# Patient Record
Sex: MALE | Race: WHITE | NOT HISPANIC OR LATINO | URBAN - METROPOLITAN AREA
[De-identification: names, ages, dates, MRNs, and addresses within clinical notes are randomized per-mention and may not be internally consistent; named-entity substitution may affect disease eponyms.]

---

## 2017-01-26 ENCOUNTER — EMERGENCY (EMERGENCY)
Facility: HOSPITAL | Age: 82
LOS: 0 days | Discharge: NURSING, REHAB OR LONG-TERM CARE | End: 2017-01-26
Admitting: EMERGENCY MEDICINE
Payer: MEDICARE
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
## 2017-01-26 DIAGNOSIS — S09.90XA UNSPECIFIED INJURY OF HEAD, INITIAL ENCOUNTER: ICD-10-CM
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
## 2017-01-26 DIAGNOSIS — F03.90 UNSPECIFIED DEMENTIA, UNSPECIFIED SEVERITY, WITHOUT BEHAVIORAL DISTURBANCE, PSYCHOTIC DISTURBANCE, MOOD DISTURBANCE, AND ANXIETY: ICD-10-CM

## 2017-01-26 DIAGNOSIS — Z91.81 HISTORY OF FALLING: ICD-10-CM

## 2017-01-26 PROCEDURE — 73521 X-RAY EXAM HIPS BI 2 VIEWS: CPT | Mod: 26

## 2017-01-26 PROCEDURE — 99285 EMERGENCY DEPT VISIT HI MDM: CPT

## 2017-01-26 PROCEDURE — 71010: CPT | Mod: 26

## 2017-01-26 PROCEDURE — 70450 CT HEAD/BRAIN W/O DYE: CPT | Mod: 26

## 2017-03-08 ENCOUNTER — EMERGENCY (EMERGENCY)
Facility: HOSPITAL | Age: 82
LOS: 0 days | Discharge: HOME | End: 2017-03-09
Attending: EMERGENCY MEDICINE | Admitting: EMERGENCY MEDICINE
Payer: MEDICARE

## 2017-03-08 VITALS — TEMPERATURE: 98 F | HEART RATE: 81 BPM | RESPIRATION RATE: 18 BRPM | OXYGEN SATURATION: 99 %

## 2017-03-08 DIAGNOSIS — S02.2XXA FRACTURE OF NASAL BONES, INITIAL ENCOUNTER FOR CLOSED FRACTURE: ICD-10-CM
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
## 2017-03-08 DIAGNOSIS — Z91.81 HISTORY OF FALLING: ICD-10-CM

## 2017-03-08 DIAGNOSIS — S09.90XA UNSPECIFIED INJURY OF HEAD, INITIAL ENCOUNTER: ICD-10-CM
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
## 2017-03-08 DIAGNOSIS — S01.112A LACERATION WITHOUT FOREIGN BODY OF LEFT EYELID AND PERIOCULAR AREA, INITIAL ENCOUNTER: ICD-10-CM

## 2017-03-08 LAB
ALBUMIN SERPL ELPH-MCNC: 3 G/DL — LOW (ref 3.3–5)
ALP SERPL-CCNC: 103 U/L — SIGNIFICANT CHANGE UP (ref 40–120)
ALT FLD-CCNC: 23 U/L — SIGNIFICANT CHANGE UP (ref 12–78)
ANION GAP SERPL CALC-SCNC: 7 MMOL/L — SIGNIFICANT CHANGE UP (ref 5–17)
APTT BLD: 27.6 SEC — SIGNIFICANT CHANGE UP (ref 27.5–37.4)
AST SERPL-CCNC: 23 U/L — SIGNIFICANT CHANGE UP (ref 15–37)
BASOPHILS # BLD AUTO: 0.1 K/UL — SIGNIFICANT CHANGE UP (ref 0–0.2)
BASOPHILS NFR BLD AUTO: 1.4 % — SIGNIFICANT CHANGE UP (ref 0–2)
BILIRUB SERPL-MCNC: 0.5 MG/DL — SIGNIFICANT CHANGE UP (ref 0.2–1.2)
BUN SERPL-MCNC: 25 MG/DL — HIGH (ref 7–23)
CALCIUM SERPL-MCNC: 8.9 MG/DL — SIGNIFICANT CHANGE UP (ref 8.5–10.1)
CHLORIDE SERPL-SCNC: 110 MMOL/L — HIGH (ref 96–108)
CO2 SERPL-SCNC: 30 MMOL/L — SIGNIFICANT CHANGE UP (ref 22–31)
CREAT SERPL-MCNC: 1.3 MG/DL — SIGNIFICANT CHANGE UP (ref 0.5–1.3)
EOSINOPHIL # BLD AUTO: 0.1 K/UL — SIGNIFICANT CHANGE UP (ref 0–0.5)
EOSINOPHIL NFR BLD AUTO: 1.5 % — SIGNIFICANT CHANGE UP (ref 0–6)
GLUCOSE SERPL-MCNC: 177 MG/DL — HIGH (ref 70–99)
HCT VFR BLD CALC: 40.4 % — SIGNIFICANT CHANGE UP (ref 39–50)
HGB BLD-MCNC: 13.6 G/DL — SIGNIFICANT CHANGE UP (ref 13–17)
INR BLD: 1.04 RATIO — SIGNIFICANT CHANGE UP (ref 0.88–1.16)
LYMPHOCYTES # BLD AUTO: 1.4 K/UL — SIGNIFICANT CHANGE UP (ref 1–3.3)
LYMPHOCYTES # BLD AUTO: 15.8 % — SIGNIFICANT CHANGE UP (ref 13–44)
MCHC RBC-ENTMCNC: 28.7 PG — SIGNIFICANT CHANGE UP (ref 27–34)
MCHC RBC-ENTMCNC: 33.6 GM/DL — SIGNIFICANT CHANGE UP (ref 32–36)
MCV RBC AUTO: 85.6 FL — SIGNIFICANT CHANGE UP (ref 80–100)
MONOCYTES # BLD AUTO: 0.7 K/UL — SIGNIFICANT CHANGE UP (ref 0–0.9)
MONOCYTES NFR BLD AUTO: 8.4 % — SIGNIFICANT CHANGE UP (ref 2–14)
NEUTROPHILS # BLD AUTO: 6.5 K/UL — SIGNIFICANT CHANGE UP (ref 1.8–7.4)
NEUTROPHILS NFR BLD AUTO: 72.9 % — SIGNIFICANT CHANGE UP (ref 43–77)
PLATELET # BLD AUTO: 232 K/UL — SIGNIFICANT CHANGE UP (ref 150–400)
POTASSIUM SERPL-MCNC: 4.4 MMOL/L — SIGNIFICANT CHANGE UP (ref 3.5–5.3)
POTASSIUM SERPL-SCNC: 4.4 MMOL/L — SIGNIFICANT CHANGE UP (ref 3.5–5.3)
PROT SERPL-MCNC: 7 GM/DL — SIGNIFICANT CHANGE UP (ref 6–8.3)
PROTHROM AB SERPL-ACNC: 11.4 SEC — SIGNIFICANT CHANGE UP (ref 10–13.1)
RBC # BLD: 4.72 M/UL — SIGNIFICANT CHANGE UP (ref 4.2–5.8)
RBC # FLD: 12.3 % — SIGNIFICANT CHANGE UP (ref 10.3–14.5)
SODIUM SERPL-SCNC: 147 MMOL/L — HIGH (ref 135–145)
WBC # BLD: 8.9 K/UL — SIGNIFICANT CHANGE UP (ref 3.8–10.5)
WBC # FLD AUTO: 8.9 K/UL — SIGNIFICANT CHANGE UP (ref 3.8–10.5)

## 2017-03-08 PROCEDURE — 76377 3D RENDER W/INTRP POSTPROCES: CPT | Mod: 26

## 2017-03-08 PROCEDURE — 72125 CT NECK SPINE W/O DYE: CPT | Mod: 26

## 2017-03-08 PROCEDURE — 99285 EMERGENCY DEPT VISIT HI MDM: CPT | Mod: 25

## 2017-03-08 PROCEDURE — 70450 CT HEAD/BRAIN W/O DYE: CPT | Mod: 26

## 2017-03-08 PROCEDURE — 70486 CT MAXILLOFACIAL W/O DYE: CPT | Mod: 26

## 2017-03-08 RX ADMIN — Medication 2 MILLIGRAM(S): at 20:35

## 2017-03-08 NOTE — ED PROVIDER NOTE - NEUROLOGICAL, MLM
Alert and oriented, no focal deficits, no motor or sensory deficits. no focal deficits, no motor or sensory deficits.

## 2017-03-08 NOTE — ED PROVIDER NOTE - NS ED MD SCRIBE ATTENDING SCRIBE SECTIONS
RESULTS/PHYSICAL EXAM/PAST MEDICAL/SURGICAL/SOCIAL HISTORY/HIV/PROGRESS NOTE/INTAKE ASSESSMENT/SCREENINGS/HISTORY OF PRESENT ILLNESS/REVIEW OF SYSTEMS/CONSULTATIONS/SHIFT CHANGE/DISPOSITION

## 2017-03-08 NOTE — ED PROVIDER NOTE - MUSCULOSKELETAL, MLM
Spine appears normal, range of motion is not limited, no muscle or joint tenderness Spine appears normal, range of motion is not limited, no muscle or joint tenderness; moving all 4 extremities

## 2017-03-08 NOTE — ED PROVIDER NOTE - ENMT, MLM
Airway patent, Nasal mucosa clear. Mouth with normal mucosa. Throat has no vesicles, no oropharyngeal exudates and uvula is midline. Airway patent,  Mouth with normal mucosa. Throat has no vesicles, no oropharyngeal exudates and uvula is midline.  +diffuse swelling to nose; dried blood b/l nares; no septal hematoma

## 2017-03-08 NOTE — ED ADULT TRIAGE NOTE - CHIEF COMPLAINT QUOTE
witnessed fall at [facility] [address], pt was standing up and fell out of his wheelchair, lac above left eye

## 2017-03-08 NOTE — ED PROVIDER NOTE - PROGRESS NOTE DETAILS
Pt's son Bob: [phone] patient son and daughter do not want patient to return to [facility]  will hold in ED for social work evaluation in am  signed out to Dr. Pitts pending SINAN cm in am

## 2017-03-08 NOTE — ED PROVIDER NOTE - OBJECTIVE STATEMENT
80 y/o M presents to the ED s/p fall. The pt's son provides that the pt fell at [facility]. Pt has a lac over left eyebrow, bleed from right nostril, and lac above left elbow.

## 2017-03-08 NOTE — ED ADULT NURSE NOTE - CHIEF COMPLAINT QUOTE
witnessed fall at [facility], pt was standing up and fell out of his wheelchair, lac above left eye

## 2017-03-08 NOTE — ED PROVIDER NOTE - CARE PLAN
Principal Discharge DX:	Head trauma  Secondary Diagnosis:	Nasal fracture  Secondary Diagnosis:	Laceration

## 2017-03-09 VITALS
DIASTOLIC BLOOD PRESSURE: 75 MMHG | SYSTOLIC BLOOD PRESSURE: 188 MMHG | RESPIRATION RATE: 18 BRPM | HEART RATE: 88 BPM | OXYGEN SATURATION: 199 % | TEMPERATURE: 98 F

## 2017-03-09 RX ORDER — DIVALPROEX SODIUM 500 MG/1
250 TABLET, DELAYED RELEASE ORAL ONCE
Qty: 0 | Refills: 0 | Status: DISCONTINUED | OUTPATIENT
Start: 2017-03-09 | End: 2017-03-09

## 2017-03-09 RX ORDER — MEMANTINE HYDROCHLORIDE 10 MG/1
10 TABLET ORAL ONCE
Qty: 0 | Refills: 0 | Status: DISCONTINUED | OUTPATIENT
Start: 2017-03-09 | End: 2017-03-09

## 2017-03-09 RX ORDER — HYDRALAZINE HCL 50 MG
10 TABLET ORAL ONCE
Qty: 0 | Refills: 0 | Status: COMPLETED | OUTPATIENT
Start: 2017-03-09 | End: 2017-03-09

## 2017-03-09 RX ORDER — QUETIAPINE FUMARATE 200 MG/1
25 TABLET, FILM COATED ORAL ONCE
Qty: 0 | Refills: 0 | Status: DISCONTINUED | OUTPATIENT
Start: 2017-03-09 | End: 2017-03-09

## 2017-03-09 RX ADMIN — Medication 2 MILLIGRAM(S): at 03:58

## 2017-03-09 RX ADMIN — Medication 10 MILLIGRAM(S): at 07:55

## 2017-03-09 NOTE — ED ADULT NURSE REASSESSMENT NOTE - NS ED NURSE REASSESS COMMENT FT1
Pt. medicated as ordered. Pt. is resting sleeping and calm in bed- Pending SW in the AM - Will cont to monitor pt. closely - Safety maintained

## 2017-08-10 ENCOUNTER — EMERGENCY (EMERGENCY)
Facility: HOSPITAL | Age: 82
LOS: 0 days | Discharge: NURSING, REHAB OR LONG-TERM CARE | End: 2017-08-10
Attending: EMERGENCY MEDICINE
Payer: MEDICARE

## 2017-08-10 VITALS
OXYGEN SATURATION: 100 % | DIASTOLIC BLOOD PRESSURE: 72 MMHG | SYSTOLIC BLOOD PRESSURE: 160 MMHG | RESPIRATION RATE: 17 BRPM | TEMPERATURE: 98 F | HEART RATE: 72 BPM

## 2017-08-10 VITALS
DIASTOLIC BLOOD PRESSURE: 74 MMHG | HEART RATE: 66 BPM | HEIGHT: 68 IN | TEMPERATURE: 98 F | WEIGHT: 179.9 LBS | SYSTOLIC BLOOD PRESSURE: 155 MMHG | RESPIRATION RATE: 16 BRPM | OXYGEN SATURATION: 100 %

## 2017-08-10 DIAGNOSIS — S01.81XA LACERATION WITHOUT FOREIGN BODY OF OTHER PART OF HEAD, INITIAL ENCOUNTER: ICD-10-CM

## 2017-08-10 DIAGNOSIS — S01.01XA LACERATION WITHOUT FOREIGN BODY OF SCALP, INITIAL ENCOUNTER: ICD-10-CM
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
## 2017-08-10 DIAGNOSIS — F03.90 UNSPECIFIED DEMENTIA, UNSPECIFIED SEVERITY, WITHOUT BEHAVIORAL DISTURBANCE, PSYCHOTIC DISTURBANCE, MOOD DISTURBANCE, AND ANXIETY: ICD-10-CM
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
## 2017-08-10 DIAGNOSIS — I10 ESSENTIAL (PRIMARY) HYPERTENSION: ICD-10-CM

## 2017-08-10 PROCEDURE — 71010: CPT | Mod: 26

## 2017-08-10 PROCEDURE — 70486 CT MAXILLOFACIAL W/O DYE: CPT | Mod: 26

## 2017-08-10 PROCEDURE — 99284 EMERGENCY DEPT VISIT MOD MDM: CPT | Mod: 25

## 2017-08-10 PROCEDURE — 70450 CT HEAD/BRAIN W/O DYE: CPT | Mod: 26

## 2017-08-10 PROCEDURE — 76377 3D RENDER W/INTRP POSTPROCES: CPT | Mod: 26

## 2017-08-10 PROCEDURE — 12001 RPR S/N/AX/GEN/TRNK 2.5CM/<: CPT

## 2017-08-10 PROCEDURE — 72125 CT NECK SPINE W/O DYE: CPT | Mod: 26

## 2017-08-10 RX ORDER — TETANUS TOXOID, REDUCED DIPHTHERIA TOXOID AND ACELLULAR PERTUSSIS VACCINE, ADSORBED 5; 2.5; 8; 8; 2.5 [IU]/.5ML; [IU]/.5ML; UG/.5ML; UG/.5ML; UG/.5ML
0.5 SUSPENSION INTRAMUSCULAR ONCE
Qty: 0 | Refills: 0 | Status: COMPLETED | OUTPATIENT
Start: 2017-08-10 | End: 2017-08-10

## 2017-08-10 RX ADMIN — TETANUS TOXOID, REDUCED DIPHTHERIA TOXOID AND ACELLULAR PERTUSSIS VACCINE, ADSORBED 0.5 MILLILITER(S): 5; 2.5; 8; 8; 2.5 SUSPENSION INTRAMUSCULAR at 14:21

## 2017-08-10 NOTE — ED PROVIDER NOTE - SKIN, MLM
Abrasion on R cheek, 0.5cm vertical laceration w/o active bleeding overlying medial aspect of R eyebrow

## 2017-08-10 NOTE — ED PROVIDER NOTE - CARE PLAN
Principal Discharge DX:	Fall  Instructions for follow-up, activity and diet:	Keep area dry for 24 hours. Afterward, allow water to run over area but do not scrub or immerse in water. Avoid using oil based creams or products as this will cause the dermabond to dissolve.  Do not pull or attempt to remove Dermabond--allow wound to heal underneath and Dermabond will fall off on its own when ready.  Return to the emergency department if you experience worsening pain, fever, drainage from site, redness, red streaking, or any other emergency. Principal Discharge DX:	Fall  Instructions for follow-up, activity and diet:	Keep area dry for 24 hours. Afterward, allow water to run over area but do not scrub or immerse in water. Avoid using oil based creams or products as this will cause the dermabond to dissolve.  Do not pull or attempt to remove Dermabond--allow wound to heal underneath and Dermabond will fall off on its own when ready.  Return to the emergency department if you experience worsening pain, fever, drainage from site, redness, red streaking, or any other emergency.  Secondary Diagnosis:	Laceration of forehead, initial encounter

## 2017-08-10 NOTE — ED PROVIDER NOTE - ATTENDING CONTRIBUTION TO CARE
82M with advanced dementia presents with facial laceration s/p fall. Unable to obtain further history. Agitated male, confused, but awake, alert, GCS 13. Trachea midline, Normal conjunctiva, CTAB, Non-tachycardic, Normal perfusion, Abdomen Soft, NTND, No rebound/guarding, No LE edema. No midline C, T, L stepoff or ttp. Full ROM extremities without focal ttp. Moving extremities x 4.     81 y/o male with advanced dementia with forehead laceration s/p fall at nursing home. CT head, c-spine, max-face. Lac repair. Re-assess. - Gene Miranda MD

## 2017-08-10 NOTE — ED PROVIDER NOTE - PROGRESS NOTE DETAILS
Jr Medina MD PGY3: Imaging reviewed. Laceration repaired w/ dermabond. Will discharge with instructions for outpatient follow-up. patient's son arrived who confirms that patient is at baseline. discussed results with son. patient very agitated, son requests transfer to NH as soon as possible. will arrange. - Gene Miranda MD

## 2017-08-10 NOTE — ED PROVIDER NOTE - PLAN OF CARE
Keep area dry for 24 hours. Afterward, allow water to run over area but do not scrub or immerse in water. Avoid using oil based creams or products as this will cause the dermabond to dissolve.  Do not pull or attempt to remove Dermabond--allow wound to heal underneath and Dermabond will fall off on its own when ready.  Return to the emergency department if you experience worsening pain, fever, drainage from site, redness, red streaking, or any other emergency.

## 2017-08-10 NOTE — ED PROVIDER NOTE - OBJECTIVE STATEMENT
83 yo man w/ dementia sent from NH for fall. Pt reportedly fell, hitting R face in process. Unable to provide history in ED.

## 2017-10-23 ENCOUNTER — EMERGENCY (EMERGENCY)
Facility: HOSPITAL | Age: 82
LOS: 0 days | Discharge: HOME | End: 2017-10-23
Attending: EMERGENCY MEDICINE | Admitting: EMERGENCY MEDICINE
Payer: MEDICARE

## 2017-10-23 VITALS
DIASTOLIC BLOOD PRESSURE: 76 MMHG | RESPIRATION RATE: 18 BRPM | OXYGEN SATURATION: 99 % | TEMPERATURE: 98 F | SYSTOLIC BLOOD PRESSURE: 136 MMHG | HEART RATE: 99 BPM

## 2017-10-23 VITALS
HEART RATE: 97 BPM | SYSTOLIC BLOOD PRESSURE: 160 MMHG | WEIGHT: 169.98 LBS | HEIGHT: 69 IN | RESPIRATION RATE: 15 BRPM | OXYGEN SATURATION: 97 % | DIASTOLIC BLOOD PRESSURE: 90 MMHG

## 2017-10-23 DIAGNOSIS — S01.81XA LACERATION WITHOUT FOREIGN BODY OF OTHER PART OF HEAD, INITIAL ENCOUNTER: ICD-10-CM

## 2017-10-23 DIAGNOSIS — S09.93XA UNSPECIFIED INJURY OF FACE, INITIAL ENCOUNTER: ICD-10-CM
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
## 2017-10-23 DIAGNOSIS — S02.2XXA FRACTURE OF NASAL BONES, INITIAL ENCOUNTER FOR CLOSED FRACTURE: ICD-10-CM

## 2017-10-23 DIAGNOSIS — I10 ESSENTIAL (PRIMARY) HYPERTENSION: ICD-10-CM

## 2017-10-23 DIAGNOSIS — F03.90 UNSPECIFIED DEMENTIA WITHOUT BEHAVIORAL DISTURBANCE: ICD-10-CM

## 2017-10-23 PROCEDURE — 71010: CPT | Mod: 26

## 2017-10-23 PROCEDURE — 72190 X-RAY EXAM OF PELVIS: CPT | Mod: 26

## 2017-10-23 PROCEDURE — 70450 CT HEAD/BRAIN W/O DYE: CPT | Mod: 26

## 2017-10-23 PROCEDURE — 72125 CT NECK SPINE W/O DYE: CPT | Mod: 26

## 2017-10-23 PROCEDURE — 93010 ELECTROCARDIOGRAM REPORT: CPT

## 2017-10-23 PROCEDURE — 99285 EMERGENCY DEPT VISIT HI MDM: CPT

## 2017-10-23 PROCEDURE — 12013 RPR F/E/E/N/L/M 2.6-5.0 CM: CPT

## 2017-10-23 PROCEDURE — 76377 3D RENDER W/INTRP POSTPROCES: CPT | Mod: 26

## 2017-10-23 PROCEDURE — 70486 CT MAXILLOFACIAL W/O DYE: CPT | Mod: 26

## 2017-10-23 RX ORDER — TETANUS TOXOID, REDUCED DIPHTHERIA TOXOID AND ACELLULAR PERTUSSIS VACCINE, ADSORBED 5; 2.5; 8; 8; 2.5 [IU]/.5ML; [IU]/.5ML; UG/.5ML; UG/.5ML; UG/.5ML
0.5 SUSPENSION INTRAMUSCULAR ONCE
Qty: 0 | Refills: 0 | Status: COMPLETED | OUTPATIENT
Start: 2017-10-23 | End: 2017-10-23

## 2017-10-23 RX ORDER — HALOPERIDOL DECANOATE 100 MG/ML
5 INJECTION INTRAMUSCULAR ONCE
Qty: 0 | Refills: 0 | Status: COMPLETED | OUTPATIENT
Start: 2017-10-23 | End: 2017-10-23

## 2017-10-23 RX ADMIN — HALOPERIDOL DECANOATE 5 MILLIGRAM(S): 100 INJECTION INTRAMUSCULAR at 11:26

## 2017-10-23 RX ADMIN — TETANUS TOXOID, REDUCED DIPHTHERIA TOXOID AND ACELLULAR PERTUSSIS VACCINE, ADSORBED 0.5 MILLILITER(S): 5; 2.5; 8; 8; 2.5 SUSPENSION INTRAMUSCULAR at 11:26

## 2017-10-23 NOTE — ED ADULT TRIAGE NOTE - CHIEF COMPLAINT QUOTE
fall from standing height at apex, no blood thinners, facial bleeding noted to nose, pt has hx of dementia.

## 2017-10-23 NOTE — ED ADULT NURSE NOTE - CHPI ED SYMPTOMS NEG
no fever/no tingling/no confusion/no abrasion/no loss of consciousness/no vomiting/no deformity/no weakness/no numbness

## 2017-10-23 NOTE — ED PROVIDER NOTE - PROGRESS NOTE DETAILS
CTs as above, nasal fracture w/o intracranial injury. Laceration repaired by PA, see separate note.  Will clear for DC back to SNF with instructions for nasal precautions (as feasible), abx, ENT f/u

## 2017-10-23 NOTE — ED PROVIDER NOTE - OBJECTIVE STATEMENT
82 M hx dementia, HTN presents from SNF after fall. Per EMS, fall was witnessed by staff, pt fell from standing height. Neg LOC, no blood thinners. Pt presents with bleeding from central forehead laceration and dried blood in mouth, no resp distress. Remainder of hx limited by mental status.

## 2017-10-23 NOTE — ED PROVIDER NOTE - CARE PLAN
Principal Discharge DX:	Nasal fracture  Secondary Diagnosis:	Facial laceration, initial encounter  Secondary Diagnosis:	Fall

## 2017-10-23 NOTE — ED PROVIDER NOTE - ENMT, MLM
Airway patent, Nasal mucosa clear. Mouth with dried blood, no active bleeding. Throat has no vesicles, no oropharyngeal exudates and uvula is midline.

## 2017-11-09 ENCOUNTER — EMERGENCY (EMERGENCY)
Facility: HOSPITAL | Age: 82
LOS: 0 days | Discharge: NURSING, REHAB OR LONG-TERM CARE | End: 2017-11-09
Attending: FAMILY MEDICINE | Admitting: EMERGENCY MEDICINE
Payer: MEDICARE

## 2017-11-09 VITALS
WEIGHT: 164.91 LBS | RESPIRATION RATE: 19 BRPM | DIASTOLIC BLOOD PRESSURE: 93 MMHG | SYSTOLIC BLOOD PRESSURE: 149 MMHG | OXYGEN SATURATION: 97 % | HEART RATE: 92 BPM | HEIGHT: 67 IN | TEMPERATURE: 98 F
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
## 2017-11-09 DIAGNOSIS — F03.90 UNSPECIFIED DEMENTIA WITHOUT BEHAVIORAL DISTURBANCE: ICD-10-CM

## 2017-11-09 DIAGNOSIS — S02.2XXA FRACTURE OF NASAL BONES, INITIAL ENCOUNTER FOR CLOSED FRACTURE: ICD-10-CM

## 2017-11-09 DIAGNOSIS — S51.011A LACERATION WITHOUT FOREIGN BODY OF RIGHT ELBOW, INITIAL ENCOUNTER: ICD-10-CM

## 2017-11-09 DIAGNOSIS — Z91.81 HISTORY OF FALLING: ICD-10-CM

## 2017-11-09 DIAGNOSIS — S00.81XA ABRASION OF OTHER PART OF HEAD, INITIAL ENCOUNTER: ICD-10-CM

## 2017-11-09 DIAGNOSIS — S09.90XA UNSPECIFIED INJURY OF HEAD, INITIAL ENCOUNTER: ICD-10-CM
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
## 2017-11-09 PROCEDURE — 70486 CT MAXILLOFACIAL W/O DYE: CPT | Mod: 26

## 2017-11-09 PROCEDURE — 72190 X-RAY EXAM OF PELVIS: CPT | Mod: 26

## 2017-11-09 PROCEDURE — 70450 CT HEAD/BRAIN W/O DYE: CPT | Mod: 26

## 2017-11-09 PROCEDURE — 73080 X-RAY EXAM OF ELBOW: CPT | Mod: 26,RT

## 2017-11-09 PROCEDURE — 99285 EMERGENCY DEPT VISIT HI MDM: CPT

## 2017-11-09 PROCEDURE — 76377 3D RENDER W/INTRP POSTPROCES: CPT | Mod: 26

## 2017-11-09 PROCEDURE — 72125 CT NECK SPINE W/O DYE: CPT | Mod: 26

## 2017-11-09 PROCEDURE — 71010: CPT | Mod: 26

## 2017-11-09 RX ORDER — HALOPERIDOL DECANOATE 100 MG/ML
5 INJECTION INTRAMUSCULAR ONCE
Qty: 0 | Refills: 0 | Status: COMPLETED | OUTPATIENT
Start: 2017-11-09 | End: 2017-11-09

## 2017-11-09 RX ORDER — ACETAMINOPHEN 500 MG
650 TABLET ORAL ONCE
Qty: 0 | Refills: 0 | Status: COMPLETED | OUTPATIENT
Start: 2017-11-09 | End: 2017-11-09

## 2017-11-09 RX ADMIN — Medication 650 MILLIGRAM(S): at 17:08

## 2017-11-09 RX ADMIN — HALOPERIDOL DECANOATE 5 MILLIGRAM(S): 100 INJECTION INTRAMUSCULAR at 14:44

## 2017-11-09 NOTE — ED ADULT NURSE REASSESSMENT NOTE - NS ED NURSE REASSESS COMMENT FT1
Pt incontinent of stool and urine. Cleaned and repositioned for comfort. Pt resting calmly. Safety maintained. Awaiting transport back to [place].

## 2017-11-09 NOTE — ED PROVIDER NOTE - SKIN, MLM
Skin normal color for race, warm, dry. Abrasion and hematoma to R frontal forehead. R elbow skin tear.

## 2017-11-09 NOTE — ED ADULT TRIAGE NOTE - CHIEF COMPLAINT QUOTE
fall out of wheelchair at nursing home. hx of chronic falls. no blood thinners. pt at baseline mental status.

## 2017-11-09 NOTE — ED PROVIDER NOTE - PROGRESS NOTE DETAILS
MELONY Sullivan have attested this document for and under the supervision of Dr. Bueno Drew SP: Dr. Bueno spoke with Dr. Preston who advised a follow up in a few days after swelling goes down. Documentation shared with scribjosephine Stevenson. Agree with notes. Myles ESPINOZA Documentation shared with bharat Sullivan. Agree with notes. Myles ESPINOZA Spoke to son, answered all questions.  He states pt had been having renal insuff for the past week.  Saw Dr. Bishop last week Cr. 2.8.

## 2017-11-09 NOTE — ED ADULT NURSE REASSESSMENT NOTE - NS ED NURSE REASSESS COMMENT FT1
Pt returned from CT and xray. VSS/pt with hx of dementia at baseline. Safety maintained/pt placed in view of nursing station. Will continue to monitor.

## 2017-11-09 NOTE — ED PROVIDER NOTE - CARE PLAN
Principal Discharge DX:	Closed fracture of nasal bone, initial encounter  Secondary Diagnosis:	Fall, initial encounter

## 2017-11-09 NOTE — ED PROVIDER NOTE - EYES, MLM
Clear bilaterally, pupils equal, round and reactive to light. Clear R. Swelling over R upper eyelid, no eye redness.

## 2017-11-09 NOTE — ED ADULT NURSE REASSESSMENT NOTE - NS ED NURSE REASSESS COMMENT FT1
[facility] arrived for transport. Pt refusing VS. Printed DC instructions given to [facility] personnel.

## 2017-11-09 NOTE — ED PROVIDER NOTE - OBJECTIVE STATEMENT
81 y/o M with a PMHx of dementia presents to the ED being sent in by nursing facility s/p fall out of wheelchair earlier this afternoon. EMS states that he did hit his head, no LOC, awake, alert, but unable to provide reliable hx due to baseline mental status. EMS states that pt son is coming to meet him in ED.

## 2017-11-09 NOTE — ED ADULT NURSE NOTE - OBJECTIVE STATEMENT
Pt with hx of dementia and frequent falls from nursing home.  Pt fell out of chair, noted to have hematoma on right head, skin tear on right elbow and ecchymotic area over right eye.  Pt at normal baseline which is confused.  No signs of pain noted.  Pt not on AC. VSS, safety maintained, will continue to monitor,

## 2017-11-09 NOTE — ED PROVIDER NOTE - DIAGNOSTIC INTERPRETATION
Markedly displaced right nasal fracture and mildly angulated displaced left nasal fracture. Overlying soft tissue edema is present.

## 2017-11-09 NOTE — ED PROVIDER NOTE - HEAD, MLM
Head is traumatic. Head shape is symmetrical. L frontal forehead hematoma Head is traumatic. Head shape is symmetrical. R frontal forehead hematoma

## 2017-11-09 NOTE — ED ADULT NURSE REASSESSMENT NOTE - NS ED NURSE REASSESS COMMENT FT1
Pt son contacted/notified of DC and awaiting transport back to [place]. Safety maintained. Pt sleeping.

## 2017-12-24 ENCOUNTER — INPATIENT (INPATIENT)
Facility: HOSPITAL | Age: 82
LOS: 9 days | Discharge: HOME | End: 2018-01-03
Attending: INTERNAL MEDICINE | Admitting: FAMILY MEDICINE
Payer: MEDICARE

## 2017-12-24 VITALS
HEART RATE: 84 BPM | WEIGHT: 147.05 LBS | HEIGHT: 70 IN | SYSTOLIC BLOOD PRESSURE: 134 MMHG | OXYGEN SATURATION: 98 % | TEMPERATURE: 98 F | DIASTOLIC BLOOD PRESSURE: 71 MMHG | RESPIRATION RATE: 18 BRPM

## 2017-12-24 LAB
ALBUMIN SERPL ELPH-MCNC: 2.9 G/DL — LOW (ref 3.3–5)
ALP SERPL-CCNC: 97 U/L — SIGNIFICANT CHANGE UP (ref 40–120)
ALT FLD-CCNC: 18 U/L — SIGNIFICANT CHANGE UP (ref 12–78)
ANION GAP SERPL CALC-SCNC: 8 MMOL/L — SIGNIFICANT CHANGE UP (ref 5–17)
APTT BLD: 29.1 SEC — SIGNIFICANT CHANGE UP (ref 27.5–37.4)
AST SERPL-CCNC: 13 U/L — LOW (ref 15–37)
BASOPHILS # BLD AUTO: 0 K/UL — SIGNIFICANT CHANGE UP (ref 0–0.2)
BASOPHILS NFR BLD AUTO: 0.5 % — SIGNIFICANT CHANGE UP (ref 0–2)
BILIRUB SERPL-MCNC: 0.5 MG/DL — SIGNIFICANT CHANGE UP (ref 0.2–1.2)
BUN SERPL-MCNC: 76 MG/DL — HIGH (ref 7–23)
CALCIUM SERPL-MCNC: 8 MG/DL — LOW (ref 8.5–10.1)
CHLORIDE SERPL-SCNC: 121 MMOL/L — HIGH (ref 96–108)
CO2 SERPL-SCNC: 23 MMOL/L — SIGNIFICANT CHANGE UP (ref 22–31)
CREAT SERPL-MCNC: 3.91 MG/DL — HIGH (ref 0.5–1.3)
EOSINOPHIL # BLD AUTO: 0.2 K/UL — SIGNIFICANT CHANGE UP (ref 0–0.5)
EOSINOPHIL NFR BLD AUTO: 2 % — SIGNIFICANT CHANGE UP (ref 0–6)
GLUCOSE SERPL-MCNC: 140 MG/DL — HIGH (ref 70–99)
HCT VFR BLD CALC: 29.4 % — LOW (ref 39–50)
HGB BLD-MCNC: 9.7 G/DL — LOW (ref 13–17)
INR BLD: 0.99 RATIO — SIGNIFICANT CHANGE UP (ref 0.88–1.16)
LYMPHOCYTES # BLD AUTO: 1.2 K/UL — SIGNIFICANT CHANGE UP (ref 1–3.3)
LYMPHOCYTES # BLD AUTO: 14.5 % — SIGNIFICANT CHANGE UP (ref 13–44)
MCHC RBC-ENTMCNC: 29.8 PG — SIGNIFICANT CHANGE UP (ref 27–34)
MCHC RBC-ENTMCNC: 33 GM/DL — SIGNIFICANT CHANGE UP (ref 32–36)
MCV RBC AUTO: 90.5 FL — SIGNIFICANT CHANGE UP (ref 80–100)
MONOCYTES # BLD AUTO: 0.5 K/UL — SIGNIFICANT CHANGE UP (ref 0–0.9)
MONOCYTES NFR BLD AUTO: 6.1 % — SIGNIFICANT CHANGE UP (ref 2–14)
NEUTROPHILS # BLD AUTO: 6.6 K/UL — SIGNIFICANT CHANGE UP (ref 1.8–7.4)
NEUTROPHILS NFR BLD AUTO: 77 % — SIGNIFICANT CHANGE UP (ref 43–77)
PLATELET # BLD AUTO: 102 K/UL — LOW (ref 150–400)
POTASSIUM SERPL-MCNC: 4.8 MMOL/L — SIGNIFICANT CHANGE UP (ref 3.5–5.3)
POTASSIUM SERPL-SCNC: 4.8 MMOL/L — SIGNIFICANT CHANGE UP (ref 3.5–5.3)
PROT SERPL-MCNC: 6.8 GM/DL — SIGNIFICANT CHANGE UP (ref 6–8.3)
PROTHROM AB SERPL-ACNC: 10.7 SEC — SIGNIFICANT CHANGE UP (ref 9.8–12.7)
RBC # BLD: 3.26 M/UL — LOW (ref 4.2–5.8)
RBC # FLD: 13.4 % — SIGNIFICANT CHANGE UP (ref 10.3–14.5)
SODIUM SERPL-SCNC: 152 MMOL/L — HIGH (ref 135–145)
WBC # BLD: 8.5 K/UL — SIGNIFICANT CHANGE UP (ref 3.8–10.5)
WBC # FLD AUTO: 8.5 K/UL — SIGNIFICANT CHANGE UP (ref 3.8–10.5)

## 2017-12-24 RX ORDER — SODIUM CHLORIDE 9 MG/ML
1000 INJECTION INTRAMUSCULAR; INTRAVENOUS; SUBCUTANEOUS ONCE
Qty: 0 | Refills: 0 | Status: COMPLETED | OUTPATIENT
Start: 2017-12-24 | End: 2017-12-24

## 2017-12-24 RX ADMIN — SODIUM CHLORIDE 2000 MILLILITER(S): 9 INJECTION INTRAMUSCULAR; INTRAVENOUS; SUBCUTANEOUS at 22:30

## 2017-12-24 NOTE — ED PROVIDER NOTE - GASTROINTESTINAL, MLM
Abdomen soft, non-tender, no guarding. Abdomen soft, non-tender, no guarding, no rebound, no masses. Compartment soft.

## 2017-12-24 NOTE — ED PROVIDER NOTE - OBJECTIVE STATEMENT
83 y/o M with PMHx of dementia, HTN presents to the ED BIBA from [facility] regarding abnormal lab values of creatinine and BUN. 83 y/o M with PMHx of dementia, HTN, depression presents to the ED BIB son from [facility] regarding abnormal lab values of creatinine and BUN. Per nursing home paperwork, baseline mental status is disoriented and combative. Creatine and BUN levels yesterday, 12/23/17, were 74 and 4.1. Unable to obtain a complete history due to pt's dementia.

## 2017-12-24 NOTE — ED PROVIDER NOTE - CONSTITUTIONAL, MLM
normal... Well appearing, well nourished, awake, alert, oriented to person, place, time/situation and in no apparent distress. Sleeping at time of exam.

## 2017-12-24 NOTE — ED ADULT NURSE NOTE - OBJECTIVE STATEMENT
Pt sent in from [facility] for elevated BUN/Creat. Patient confused and combative. Attempting to pull out loredo. 1:1 sitting at bedside. rectal temp 99.2.

## 2017-12-25 DIAGNOSIS — I10 ESSENTIAL (PRIMARY) HYPERTENSION: ICD-10-CM

## 2017-12-25 DIAGNOSIS — E87.0 HYPEROSMOLALITY AND HYPERNATREMIA: ICD-10-CM

## 2017-12-25 DIAGNOSIS — D64.9 ANEMIA, UNSPECIFIED: ICD-10-CM

## 2017-12-25 DIAGNOSIS — Z29.9 ENCOUNTER FOR PROPHYLACTIC MEASURES, UNSPECIFIED: ICD-10-CM

## 2017-12-25 DIAGNOSIS — N40.0 BENIGN PROSTATIC HYPERPLASIA WITHOUT LOWER URINARY TRACT SYMPTOMS: ICD-10-CM

## 2017-12-25 DIAGNOSIS — R31.9 HEMATURIA, UNSPECIFIED: ICD-10-CM

## 2017-12-25 DIAGNOSIS — R82.90 UNSPECIFIED ABNORMAL FINDINGS IN URINE: ICD-10-CM

## 2017-12-25 DIAGNOSIS — F03.91 UNSPECIFIED DEMENTIA WITH BEHAVIORAL DISTURBANCE: ICD-10-CM

## 2017-12-25 DIAGNOSIS — N17.9 ACUTE KIDNEY FAILURE, UNSPECIFIED: ICD-10-CM

## 2017-12-25 LAB
ANION GAP SERPL CALC-SCNC: 7 MMOL/L — SIGNIFICANT CHANGE UP (ref 5–17)
ANION GAP SERPL CALC-SCNC: 8 MMOL/L — SIGNIFICANT CHANGE UP (ref 5–17)
ANION GAP SERPL CALC-SCNC: 9 MMOL/L — SIGNIFICANT CHANGE UP (ref 5–17)
APPEARANCE UR: (no result)
BACTERIA # UR AUTO: (no result)
BILIRUB UR-MCNC: NEGATIVE — SIGNIFICANT CHANGE UP
BLD GP AB SCN SERPL QL: SIGNIFICANT CHANGE UP
BUN SERPL-MCNC: 59 MG/DL — HIGH (ref 7–23)
BUN SERPL-MCNC: 65 MG/DL — HIGH (ref 7–23)
BUN SERPL-MCNC: 67 MG/DL — HIGH (ref 7–23)
CALCIUM SERPL-MCNC: 8.3 MG/DL — LOW (ref 8.5–10.1)
CALCIUM SERPL-MCNC: 8.3 MG/DL — LOW (ref 8.5–10.1)
CALCIUM SERPL-MCNC: 8.4 MG/DL — LOW (ref 8.5–10.1)
CHLORIDE SERPL-SCNC: 124 MMOL/L — HIGH (ref 96–108)
CHLORIDE SERPL-SCNC: 125 MMOL/L — HIGH (ref 96–108)
CHLORIDE SERPL-SCNC: 126 MMOL/L — HIGH (ref 96–108)
CO2 SERPL-SCNC: 22 MMOL/L — SIGNIFICANT CHANGE UP (ref 22–31)
CO2 SERPL-SCNC: 25 MMOL/L — SIGNIFICANT CHANGE UP (ref 22–31)
CO2 SERPL-SCNC: 25 MMOL/L — SIGNIFICANT CHANGE UP (ref 22–31)
COLOR SPEC: (no result)
CREAT SERPL-MCNC: 3.79 MG/DL — HIGH (ref 0.5–1.3)
CREAT SERPL-MCNC: 3.93 MG/DL — HIGH (ref 0.5–1.3)
CREAT SERPL-MCNC: 3.95 MG/DL — HIGH (ref 0.5–1.3)
DIFF PNL FLD: (no result)
GLUCOSE SERPL-MCNC: 107 MG/DL — HIGH (ref 70–99)
GLUCOSE SERPL-MCNC: 113 MG/DL — HIGH (ref 70–99)
GLUCOSE SERPL-MCNC: 121 MG/DL — HIGH (ref 70–99)
GLUCOSE UR QL: NEGATIVE MG/DL — SIGNIFICANT CHANGE UP
HCT VFR BLD CALC: 27.6 % — LOW (ref 39–50)
HCT VFR BLD CALC: 27.8 % — LOW (ref 39–50)
HGB BLD-MCNC: 8.9 G/DL — LOW (ref 13–17)
HGB BLD-MCNC: 8.9 G/DL — LOW (ref 13–17)
KETONES UR-MCNC: NEGATIVE — SIGNIFICANT CHANGE UP
LEUKOCYTE ESTERASE UR-ACNC: (no result)
MCHC RBC-ENTMCNC: 29 PG — SIGNIFICANT CHANGE UP (ref 27–34)
MCHC RBC-ENTMCNC: 32.1 GM/DL — SIGNIFICANT CHANGE UP (ref 32–36)
MCV RBC AUTO: 90.4 FL — SIGNIFICANT CHANGE UP (ref 80–100)
NITRITE UR-MCNC: NEGATIVE — SIGNIFICANT CHANGE UP
PH UR: 7 — SIGNIFICANT CHANGE UP (ref 5–8)
PLATELET # BLD AUTO: 102 K/UL — LOW (ref 150–400)
POTASSIUM SERPL-MCNC: 4.4 MMOL/L — SIGNIFICANT CHANGE UP (ref 3.5–5.3)
POTASSIUM SERPL-MCNC: 4.7 MMOL/L — SIGNIFICANT CHANGE UP (ref 3.5–5.3)
POTASSIUM SERPL-MCNC: 4.7 MMOL/L — SIGNIFICANT CHANGE UP (ref 3.5–5.3)
POTASSIUM SERPL-SCNC: 4.4 MMOL/L — SIGNIFICANT CHANGE UP (ref 3.5–5.3)
POTASSIUM SERPL-SCNC: 4.7 MMOL/L — SIGNIFICANT CHANGE UP (ref 3.5–5.3)
POTASSIUM SERPL-SCNC: 4.7 MMOL/L — SIGNIFICANT CHANGE UP (ref 3.5–5.3)
PROT UR-MCNC: 100 MG/DL
RBC # BLD: 3.07 M/UL — LOW (ref 4.2–5.8)
RBC # FLD: 13.4 % — SIGNIFICANT CHANGE UP (ref 10.3–14.5)
RBC CASTS # UR COMP ASSIST: >50 /HPF (ref 0–4)
SODIUM SERPL-SCNC: 155 MMOL/L — HIGH (ref 135–145)
SODIUM SERPL-SCNC: 158 MMOL/L — HIGH (ref 135–145)
SODIUM SERPL-SCNC: 158 MMOL/L — HIGH (ref 135–145)
SP GR SPEC: 1.01 — SIGNIFICANT CHANGE UP (ref 1.01–1.02)
TYPE + AB SCN PNL BLD: SIGNIFICANT CHANGE UP
UROBILINOGEN FLD QL: NEGATIVE MG/DL — SIGNIFICANT CHANGE UP
WBC # BLD: 11.2 K/UL — HIGH (ref 3.8–10.5)
WBC # FLD AUTO: 11.2 K/UL — HIGH (ref 3.8–10.5)
WBC UR QL: SIGNIFICANT CHANGE UP

## 2017-12-25 PROCEDURE — 71010: CPT | Mod: 26

## 2017-12-25 PROCEDURE — 99285 EMERGENCY DEPT VISIT HI MDM: CPT

## 2017-12-25 PROCEDURE — 74176 CT ABD & PELVIS W/O CONTRAST: CPT | Mod: 26

## 2017-12-25 RX ORDER — TAMSULOSIN HYDROCHLORIDE 0.4 MG/1
0.4 CAPSULE ORAL AT BEDTIME
Qty: 0 | Refills: 0 | Status: DISCONTINUED | OUTPATIENT
Start: 2017-12-25 | End: 2017-12-28

## 2017-12-25 RX ORDER — ACETAMINOPHEN 500 MG
650 TABLET ORAL EVERY 6 HOURS
Qty: 0 | Refills: 0 | Status: DISCONTINUED | OUTPATIENT
Start: 2017-12-25 | End: 2017-12-25

## 2017-12-25 RX ORDER — SODIUM CHLORIDE 9 MG/ML
1000 INJECTION, SOLUTION INTRAVENOUS
Qty: 0 | Refills: 0 | Status: DISCONTINUED | OUTPATIENT
Start: 2017-12-25 | End: 2017-12-26

## 2017-12-25 RX ORDER — HALOPERIDOL DECANOATE 100 MG/ML
1 INJECTION INTRAMUSCULAR ONCE
Qty: 0 | Refills: 0 | Status: COMPLETED | OUTPATIENT
Start: 2017-12-25 | End: 2017-12-25

## 2017-12-25 RX ORDER — ACETAMINOPHEN 500 MG
650 TABLET ORAL EVERY 6 HOURS
Qty: 0 | Refills: 0 | Status: DISCONTINUED | OUTPATIENT
Start: 2017-12-25 | End: 2018-01-03

## 2017-12-25 RX ADMIN — HALOPERIDOL DECANOATE 1 MILLIGRAM(S): 100 INJECTION INTRAMUSCULAR at 05:34

## 2017-12-25 RX ADMIN — SODIUM CHLORIDE 100 MILLILITER(S): 9 INJECTION, SOLUTION INTRAVENOUS at 18:24

## 2017-12-25 RX ADMIN — Medication 202 MILLIGRAM(S): at 06:09

## 2017-12-25 NOTE — H&P ADULT - NSHPPHYSICALEXAM_GEN_ALL_CORE
Vital Signs Last 24 Hrs  T(C): 36.9 (24 Dec 2017 20:30), Max: 36.9 (24 Dec 2017 20:30)  T(F): 98.4 (24 Dec 2017 20:30), Max: 98.4 (24 Dec 2017 20:30)  HR: 91 (25 Dec 2017 00:26) (84 - 91)  BP: 118/87 (25 Dec 2017 00:26) (118/87 - 134/71)  RR: 20 (25 Dec 2017 00:26) (18 - 20)  SpO2: 99% (25 Dec 2017 00:26) (98% - 99%)    PHYSICAL EXAM:  Constitutional: NAD, awake and combative upon attempt to assess, well-developed  HEENT: Dry Mucous Membranes  Neck: Soft and supple  Respiratory: Breath sounds are clear bilaterally, No wheezing, rales or rhonchi  Cardiovascular: S1 and S2, regular rate and rhythm, no Murmurs, gallops or rubs  Gastrointestinal: Bowel Sounds present, soft, nontender, nondistended, no guarding, no rebound  Extremities: No peripheral edema  Vascular: 2+ peripheral pulses  Neurological: Unable to obtain due to pt's dementia  Musculoskeletal: 5/5 strength b/l upper and lower extremities Vital Signs Last 24 Hrs  T(C): 36.9 (24 Dec 2017 20:30), Max: 36.9 (24 Dec 2017 20:30)  T(F): 98.4 (24 Dec 2017 20:30), Max: 98.4 (24 Dec 2017 20:30)  HR: 91 (25 Dec 2017 00:26) (84 - 91)  BP: 118/87 (25 Dec 2017 00:26) (118/87 - 134/71)  RR: 20 (25 Dec 2017 00:26) (18 - 20)  SpO2: 99% (25 Dec 2017 00:26) (98% - 99%)    PHYSICAL EXAM:  Constitutional: NAD, awake and combative upon attempt to assess, well-developed  HEENT: Dry Mucous Membranes  Neck: Soft and supple  Respiratory: Breath sounds are clear bilaterally, No wheezing, rales or rhonchi  Cardiovascular: S1 and S2, regular rate and rhythm, no Murmurs, gallops or rubs  Gastrointestinal: Bowel Sounds present, soft, nontender, nondistended, no guarding, no rebound  Extremities: No peripheral edema  Vascular: 2+ peripheral pulses  : urinary loredo draining ~250cc of sanguinous urine  Neurological: Unable to obtain due to pt's dementia  Musculoskeletal: 5/5 strength b/l upper and lower extremities Vital Signs Last 24 Hrs  T(C): 36.9 (24 Dec 2017 20:30), Max: 36.9 (24 Dec 2017 20:30)  T(F): 98.4 (24 Dec 2017 20:30), Max: 98.4 (24 Dec 2017 20:30)  HR: 91 (25 Dec 2017 00:26) (84 - 91)  BP: 118/87 (25 Dec 2017 00:26) (118/87 - 134/71)  RR: 20 (25 Dec 2017 00:26) (18 - 20)  SpO2: 99% (25 Dec 2017 00:26) (98% - 99%)    PHYSICAL EXAM:  Constitutional: NAD, awake and combative upon attempt to assess  HEENT: Dry Mucous Membranes  Neck: Soft and supple  Respiratory: Unable to assess as pt is combative and actively resisting me from doing so  Cardiovascular: Unable to assess as pt is combative and actively resisting me from doing so  Gastrointestinal: +Ventral periumbilical hernia Unable to assess as pt is combative and actively resisting me from doing so  Extremities: No peripheral edema  Vascular: Unable to assess as pt is combative and actively resisting me from doing so  : urinary loredo draining ~250cc of sanguinous urine  Neurological: Unable to obtain due to pt's dementia  Musculoskeletal: Unable to assess as pt is combative and actively resisting me from doing so

## 2017-12-25 NOTE — H&P ADULT - NSHPREVIEWOFSYSTEMS_GEN_ALL_CORE
REVIEW OF SYSTEMS:  CONSTITUTIONAL:  Unable to obtain due to pt's dementia  EYES/ENT: Unable to obtain due to pt's dementia   NECK: Unable to obtain due to pt's dementia  RESPIRATORY: Unable to obtain due to pt's dementia  CARDIOVASCULAR: Unable to obtain due to pt's dementia  GASTROINTESTINAL: Unable to obtain due to pt's dementia.  GENITOURINARY: Unable to obtain due to pt's dementia  HEMATOLOGIC: Unable to obtain due to pt's dementia  NEUROLOGICAL: Unable to obtain due to pt's dementia  PSYCH: Unable to obtain due to pt's dementia  SKIN: Unable to obtain due to pt's dementia

## 2017-12-25 NOTE — H&P ADULT - NSHPSOCIALHISTORY_GEN_ALL_CORE
LTC at [facility] facility  Unable to obtain other component of the hx due to pt's dementia

## 2017-12-25 NOTE — PROGRESS NOTE ADULT - SUBJECTIVE AND OBJECTIVE BOX
82M w/ PMH of HTN, dementia, MDD, BPH was BIBA from [facility] for decreasing kidney function (BUN/Cr = 74/4.10). Pt's baseline mental status is disoriented and combative. As per [facility] staff/documentation - previous BUN/Cr was 46/1.82. Renal sonogram 17 showed questionable hydronephrosis. Could not provide the reason behind the sonogram work up.   BUN/cr = 60/3.67.  BUN/cr = 73/3.87; at which point he was started on IVF hydration. On  BUN/Cr worse at 81/4.29, then  BUN/Cr 74/4.10 and had Chance inserted that day 17.   Pt is on medication for BPH only because his family is refusing medication for tx of his other diagnoses.    Pt is full code per accompanying MOLST form.    17: Pt seen and examined at bedside. As per overnight CT read, Chance that was replaced in ED had balloon inflated in urethra so it was subsequently removed. Pt is unable to communicate and was combative.    ROS  Unable to assess    Vital Signs Last 24 Hrs  T(C): 37.9 (17 @ 08:49)  T(F): 100.3 (17 @ 08:49), Max: 100.3 (17 @ 08:49)  HR: 100 (17 @ 08:49) (84 - 100)  BP: 120/91 (17 @ 08:49)  BP(mean): --  RR: 22 (17 @ 08:49) (18 - 22)  SpO2: 100% (17 @ 08:49) (98% - 100%)  Wt(kg): --    PHYSICAL EXAM:    GENERAL: AOx1, NAD, well-groomed, well-developed. Combative  HEAD:  NC/AT  EYES: EOMI, PERRLA, no scleral icterus  HEENT: Moist mucous membranes  NECK: Supple, No JVD  CNS: Motor Strength 5/5 B/L upper and lower extremities; DTRs 2+ intact   LUNG: Clear to auscultation bilaterally; No rales, rhonchi, wheezing, or rubs  HEART: RRR; No murmurs, rubs, or gallops  ABDOMEN: ST/ND/NT  GENITOURINARY- after removal of Chance + bright red blood per urethra  EXTREMITIES:  2+ Peripheral Pulses, No clubbing, cyanosis, or edema  MUSCULOSKELETAL- Joints normal ROM, no Muscle or joint tenderness    Lab Results:                                            8.9                   Neurophils% (auto):   x      ( @ 07:50):    11.2 )-----------(102          Lymphocytes% (auto):  x                                             27.8                   Eosinphils% (auto):   x        Manual%: Neutrophils x    ; Lymphocytes x    ; Eosinophils x    ; Bands%: x    ; Blasts x         Differential:	[] Automated		[] Manual        158<H>  |  126<H>  |  65<H>  ----------------------------<  113<H>  4.7   |  25  |  3.93<H>    Ca    8.4<L>      25 Dec 2017 08:30    TPro  6.8  /  Alb  2.9<L>  /  TBili  0.5  /  DBili  x   /  AST  13<L>  /  ALT  18  /  AlkPhos  97  12-24    PT/INR - ( 24 Dec 2017 22:29 )   PT: 10.7 sec;   INR: 0.99 ratio         PTT - ( 24 Dec 2017 22:29 )  PTT:29.1 sec  Urinalysis Basic - ( 24 Dec 2017 00:30 )    Color: Red / Appearance: Slightly Turbid / S.010 / pH: x  Gluc: x / Ketone: Negative  / Bili: Negative / Urobili: Negative mg/dL   Blood: x / Protein: 100 mg/dL / Nitrite: Negative   Leuk Esterase: Small / RBC: >50 /HPF / WBC 3-5   Sq Epi: x / Non Sq Epi: x / Bacteria: Occasional    IMAGING    < from: CT Abdomen and Pelvis No Cont (17 @ 07:07) >  EXAM:  CT ABDOMEN AND PELVIS                            PROCEDURE DATE:  2017  IMPRESSION:     Moderate to severe bilateral hydroureteronephrosis. Markedly irregular   thickening of the bladder wall with perivesicular inflammatory changes.   Enlarged prostate. Correlate with cystoscopy.    < end of copied text >

## 2017-12-25 NOTE — H&P ADULT - PROBLEM SELECTOR PLAN 5
- Currently on no meds for dementia and/or associated behavioral disturbace, although full code on MOLST  - Discuss w/ family re: starting treatment - 1/4 nl saline @ 100cc/hr  - q6hr BMP - [id number] @ 100cc/hr  - q6hr BMP - Currently on no meds for dementia and/or associated behavioral disturbance, although full code on MOLST  - Discuss w/ family re: starting treatment  - 1mg Haldol stat to aid in work up as pt currently combative  - 1mg Ativan stat for procedural sedation

## 2017-12-25 NOTE — H&P ADULT - PROBLEM SELECTOR PLAN 3
- Urology consult to assess severity  - Continue tamsulosin - Urine culture  - Nephro culture - [id number] @ 100cc/hr  - q6hr BMP

## 2017-12-25 NOTE — PROGRESS NOTE ADULT - ATTENDING COMMENTS
Patient seen and examined with Siomara Gaston and Kira Chisholm on the Family Medicine Teaching Service.  Agree with history, physical, labs and plan which were reviewed in detail.

## 2017-12-25 NOTE — H&P ADULT - PMH
Dementia    Dementia with behavioral disturbance, unspecified dementia type    Depression    Gait abnormality    Hypertension    Macular degeneration    Nasal bone fractures    Posture abnormality

## 2017-12-25 NOTE — H&P ADULT - PROBLEM SELECTOR PLAN 9
- Hold Heparin Sub Q q12hr in the setting of thrombocytopenia - Hold Heparin Sub Q q12hr in the setting of thrombocytopenia  SCD

## 2017-12-25 NOTE — H&P ADULT - PROBLEM SELECTOR PLAN 2
- Urine culture  - Nephro culture # Hematuria- worsening- now, ashanti hematuria   - Will need CBI  - Urologist consult # Hematuria- worsening- now, ashanti hematuria   - Will need CBI  - Urologist consult- Dr. Vegas has been contacted. Awaiting call back - most likely 2/2 hematuria  - f/u H/H  - Transfuse if hemoglobin <8  -would consider PLT  transfusion if PLT <100 with further drop in h/h  -repeat CBC pending  - STAT type and screen  -currently hemodynamically stable - most likely 2/2 hematuria  - f/u H/H  - Transfuse if hemoglobin <8  -would consider PLT  transfusion if PLT <100 with further drop in h/h  - repeat CBC pending  - STAT type and screen  - currently hemodynamically stable

## 2017-12-25 NOTE — H&P ADULT - PROBLEM SELECTOR PLAN 6
- Heparin Sub Q q12hr - Urology consult to assess severity  - Continue tamsulosin - Hold Heparin Sub Q q12hr in the setting of thrombocytopenia  SCD

## 2017-12-25 NOTE — H&P ADULT - PROBLEM SELECTOR PLAN 8
- Currently on no meds for dementia and/or associated behavioral disturbance, although full code on MOLST  - Discuss w/ family re: starting treatment  - 1x Haldol stat to aid in work up as pt currently combative - Currently on no meds for dementia and/or associated behavioral disturbance, although full code on MOLST  - Discuss w/ family re: starting treatment  - 1x 1mg Haldol stat to aid in work up as pt currently combative  - 1x 1mg Ativan for procedural sedation - Currently on no meds for dementia and/or associated behavioral disturbance, although full code on MOLST  - Discuss w/ family re: starting treatment  - 1mg Haldol stat to aid in work up as pt currently combative  - 1mg Ativan stat for procedural sedation

## 2017-12-25 NOTE — BRIEF OPERATIVE NOTE - PROCEDURE
<<-----Click on this checkbox to enter Procedure Complex insertion of Chance catheter  12/25/2017    Active  [id number]

## 2017-12-25 NOTE — H&P ADULT - ASSESSMENT
81yo M w/PMHx of HTN, Dementia, Major Depressive  Disorder, BPH BIBA from [facility] w/BUN/cr of 74/4.10.  Baseline BUN/cr -46/1.82

## 2017-12-25 NOTE — H&P ADULT - PROBLEM SELECTOR PLAN 4
- Stable - most likely 2/2 hematuria  - f/u H/H  - Transfuse if hemoglobin <8  - - most likely 2/2 hematuria  - f/u H/H  - Transfuse if hemoglobin <8  - STAT type and screen - most likely 2/2 hematuria  - f/u H/H  - Transfuse if hemoglobin <8  -would consider PLT  transfusion if PLT <100 with further drop in h/h  -repeat CBC pending  - STAT type and screen  -currently hemodynamically stable

## 2017-12-25 NOTE — H&P ADULT - PROBLEM SELECTOR PLAN 1
- Urine sedimentation  - Ucx  - renal ultrasound  - strict i/o  - Avoid all nephro toxic drugs  - Most likely prerenal- 2/2 dehydration.   - Start D5 to decrease serum sodium levels  - Nephro consult - Urine sedimentation  - Ucx  - Stat CT abdomen pelvis w/o contrast  - strict i/o  - Avoid all nephro toxic drugs  - Most likely prerenal- 2/2 dehydration.   - Start D5 to decrease serum sodium levels  - Nephro consult - Urine sedimentation  - Stat CT abdomen pelvis w/o contrast  - strict i/o  - Avoid all nephro toxic drugs  - Most likely prerenal- ?2/2 dehydration.   - Start [id number] to decrease serum sodium levels  - Nephro consult Progressive worsening of Acute renal failure /Obstructive uropathy/ with gross hematuria/ hx of BPH  - Admit to med surg  - IVF,  -3 way loredo with CBI  -Ct abd/pelvis  w/o con/(unable to do CT urogram  with Iv con due to PRABHU)  - monitor I/O  -urology consult  -Nephrology consult  - strict i/o  - Avoid all nephro toxic drugs  - Start [id number] to decrease serum sodium levels  - Nephro consult Progressive worsening of Acute renal failure /Obstructive uropathy/ with gross hematuria/ hx of BPH  - Admit to med surg  - IVF,  -3 way loredo with CBI  -Ct abd/pelvis  w/o con/(unable to do CT urogram  with Iv con due to PRABHU)  - monitor I/O  -urology consult  -Nephrology consult  - strict i/o  - Avoid all nephro toxic drugs  - Start [id number] to decrease serum sodium levels  - Nephro consult  - CXR ordered- pending Progressive worsening of Acute renal failure /Obstructive uropathy/ with gross hematuria/ hx of BPH  - Admit to med surg  - IVF  -3 way loredo with CBI  - Ct abd/pelvis  w/o con/(unable to do CT urogram  with Iv con due to PRABHU)  - monitor I/O  -urology consult  -Nephrology consult  - strict i/o  - Avoid all nephro toxic drugs  - Start [id number] to decrease serum sodium levels  - Nephro consult  - CXR ordered- pending

## 2017-12-25 NOTE — H&P ADULT - HISTORY OF PRESENT ILLNESS
83yo M w/PMHx of HTN, Dementia, Major Depressive  Disorder, BPH BIBA from [facility] w/BUN/cr of 74/4.10.  From chart review, nursing home paperwork, and speaking to nursing home staff, pt's baseline mental status is disoriented and combative. For this reason, I was not able to attain a history from pt. I called [facility] for collateral information.    Per the [facility] supervisor,  Ms. Julieta Schroeder, pt's 10/23 BUN/cr was 46/1.82; 11/18 renal sonogram showed questionable hydronephrosis. Could not provide the reason behind the sonogram work up.    12/12 BUN/cr = 60/3.67.  12/21 BUN/cr = 73/3.87; at which point he was started on IVF 1/2 NS at 60cc/hr then eventually increased to 100cc/hr.   On 12/22 BUN/cr 81/4.29; 12/23 BUN/cr 74/4.10 and he had a Chance inserted.     Pt is full code per accompanying MOLST form. 83yo M w/PMHx of HTN, Dementia, Major Depressive  Disorder, BPH BIBA from [facility] w/BUN/cr of 74/4.10.  From chart review, nursing home paperwork, and speaking to nursing home staff, pt's baseline mental status is disoriented and combative. For this reason, I was not able to attain a history from pt. I called [facility] for collateral information.    Per the [facility] supervisor,  Ms. Julieta Schroeder, pt's 10/23 BUN/cr was 46/1.82; 11/18 renal sonogram showed questionable hydronephrosis. Could not provide the reason behind the sonogram work up.    12/12 BUN/cr = 60/3.67.  12/21 BUN/cr = 73/3.87; at which point he was started on IVF 1/2 NS at 60cc/hr then eventually increased to 100cc/hr.   On 12/22 BUN/cr 81/4.29; 12/23 BUN/cr 74/4.10 and he had a Loredo inserted.     In the ED, pt's loredo was changed, a UA was sent, and he received 1L NS bolus.    Pt is full code per accompanying MOLST form. 83yo M w/PMHx of HTN, Dementia, Major Depressive  Disorder, BPH BIBA from [facility] w/BUN/cr of 74/4.10.  From chart review, nursing home paperwork, and speaking to nursing home staff, pt's baseline mental status is disoriented and combative. For this reason, I was not able to attain a history from pt. I called [facility] for collateral information.    Per the [facility] supervisor,  MsChina Julieta Schroeder, pt's 10/23 BUN/cr was 46/1.82; 11/18 renal sonogram showed questionable hydronephrosis. Could not provide the reason behind the sonogram work up.    12/12 BUN/cr = 60/3.67.  12/21 BUN/cr = 73/3.87; at which point he was started on IVF 1/2 NS at 60cc/hr then eventually increased to 100cc/hr.   On 12/22 BUN/cr 81/4.29; 12/23 BUN/cr 74/4.10 and he had a Loredo inserted.   Per Ms. Alexandre, pt is on medication for BPH only because his family is refusing medication for tx of his other diagnoses.    In the ED, pt's loredo was changed, a UA was sent, and he received 1L NS bolus.    Pt is full code per accompanying MOLST form.

## 2017-12-25 NOTE — CONSULT NOTE ADULT - SUBJECTIVE AND OBJECTIVE BOX
loredo on evaluation for hysronephrosis found in urethra and unable to be replaced by staff   22fr coude placed clear urine output reconsult prn

## 2017-12-25 NOTE — PROGRESS NOTE ADULT - PROBLEM SELECTOR PLAN 1
Likely obstructive cause given pt's history of BPH    Urology consulted, placed coude catheter w/ return of clear urine  Monitor H&H given bleeding from previous catheter placement  Continue IV hydration - D5 @ 100  Continue tamsulosin  Trend BMPs  f/u nephrology consult

## 2017-12-26 LAB
CULTURE RESULTS: NO GROWTH — SIGNIFICANT CHANGE UP
SPECIMEN SOURCE: SIGNIFICANT CHANGE UP

## 2017-12-26 PROCEDURE — 93010 ELECTROCARDIOGRAM REPORT: CPT

## 2017-12-26 RX ORDER — SODIUM CHLORIDE 9 MG/ML
1000 INJECTION, SOLUTION INTRAVENOUS
Qty: 0 | Refills: 0 | Status: DISCONTINUED | OUTPATIENT
Start: 2017-12-26 | End: 2017-12-28

## 2017-12-26 RX ORDER — SODIUM CHLORIDE 9 MG/ML
1000 INJECTION, SOLUTION INTRAVENOUS
Qty: 0 | Refills: 0 | Status: DISCONTINUED | OUTPATIENT
Start: 2017-12-26 | End: 2017-12-26

## 2017-12-26 RX ORDER — HALOPERIDOL DECANOATE 100 MG/ML
0.5 INJECTION INTRAMUSCULAR ONCE
Qty: 0 | Refills: 0 | Status: COMPLETED | OUTPATIENT
Start: 2017-12-26 | End: 2017-12-26

## 2017-12-26 RX ADMIN — SODIUM CHLORIDE 100 MILLILITER(S): 9 INJECTION, SOLUTION INTRAVENOUS at 15:01

## 2017-12-26 RX ADMIN — TAMSULOSIN HYDROCHLORIDE 0.4 MILLIGRAM(S): 0.4 CAPSULE ORAL at 23:13

## 2017-12-26 RX ADMIN — SODIUM CHLORIDE 100 MILLILITER(S): 9 INJECTION, SOLUTION INTRAVENOUS at 04:38

## 2017-12-26 RX ADMIN — HALOPERIDOL DECANOATE 0.5 MILLIGRAM(S): 100 INJECTION INTRAMUSCULAR at 10:39

## 2017-12-26 RX ADMIN — Medication 650 MILLIGRAM(S): at 23:13

## 2017-12-26 NOTE — PROGRESS NOTE ADULT - SUBJECTIVE AND OBJECTIVE BOX
Patient is a 81 y/o/m with w/ pmhx of HTN, dementia, MDD, BPH was BIBA from [facility] for decreasing kidney function (BUN/Cr = 74/4.10). Pt's baseline mental status is disoriented and combative. As per [facility] staff/documentation - previous BUN/Cr was 46/1.82. Renal sonogram 11/18/17 showed questionable hydronephrosis. Could not provide the reason behind the sonogram work up.  12/12 BUN/cr = 60/3.67. 12/21 BUN/cr = 73/3.87; at which point he was started on IVF hydration. On 12/22 BUN/Cr worse at 81/4.29, then 12/23 BUN/Cr 74/4.10 and had Loredo inserted that day 12/23/17.   Pt is on medication for BPH only because his family is refusing medication for tx of his other diagnoses.    Pt is full code per accompanying MOLST form.    12/26: seen and eval. patient currently is not agitated. Very deconditioned. Pt seen and examined at bedside. As per overnight CT read, Loredo that was replaced in ED had balloon inflated in urethra so it was subsequently removed. Pt is unable to communicate and was combative.    ROS:   Unable to assess    PHYSICAL EXAM :  T(C): 36.3 (26 Dec 2017 12:00), Max: 37.9 (26 Dec 2017 06:15)  T(F): 97.4 (26 Dec 2017 12:00), Max: 100.3 (26 Dec 2017 06:15)  HR: 99 (26 Dec 2017 12:00) (94 - 100)  BP: 114/87 (26 Dec 2017 12:00) (114/87 - 165/73)  RR: 18 (26 Dec 2017 12:00) (18 - 18)  SpO2: 97% (26 Dec 2017 06:15) (97% - 98%)  GENERAL: frail, elderly, deconditioned. currently not combative.   HEAD:  NC/AT  EYES: EOMI, PERRLA, no scleral icterus  HEENT: Moist mucous membranes  CNS: patient not colaborating neuro exam  ABDOMEN: ST/ND/NT  GENITOURINARY: Currently no loredo, patient has a bloody urine as per nursing  EXTREMITIES:  2+ Peripheral Pulses, No clubbing, cyanosis, or edema  MUSCULOSKELETAL- Joints normal ROM, no Muscle or joint tenderness    Lab Results:    CBC Full  -  ( 25 Dec 2017 07:50 )  WBC Count : 11.2 K/uL  Hemoglobin : 8.9 g/dL  Hematocrit : 27.8 %  Platelet Count - Automated : 102 K/uL    PT/INR - ( 24 Dec 2017 22:29 )   PT: 10.7 sec;   INR: 0.99 ratio      PTT - ( 24 Dec 2017 22:29 )  PTT:29.1 sec    155<H>  |  124<H>  |  59<H>  ----------------------------<  107<H>  4.4   |  22  |  3.79<H>    Ca    8.3<L>      25 Dec 2017 18:15    TPro  6.8  /  Alb  2.9<L>  /  TBili  0.5  /  DBili  x   /  AST  13<L>  /  ALT  18  /  AlkPhos  97  12-24         IMAGING  < from: CT Abdomen and Pelvis No Cont (12.25.17 @ 07:07) >  EXAM:  CT ABDOMEN AND PELVIS                          Moderate to severe bilateral hydroureteronephrosis. Markedly irregular   thickening of the bladder wall with perivesicular inflammatory changes.   Enlarged prostate. Correlate with cystoscopy.  < end of copied text > Patient is a 81 y/o/m with w/ pmhx of HTN, dementia, MDD, BPH was BIBA from [facility] for decreasing kidney function (BUN/Cr = 74/4.10). Pt's baseline mental status is disoriented and combative. As per [facility] staff/documentation - previous BUN/Cr was 46/1.82. Renal sonogram 11/18/17 showed questionable hydronephrosis. Could not provide the reason behind the sonogram work up.  12/12 BUN/cr = 60/3.67. 12/21 BUN/cr = 73/3.87; at which point he was started on IVF hydration. On 12/22 BUN/Cr worse at 81/4.29, then 12/23 BUN/Cr 74/4.10 and had Loredo inserted that day 12/23/17.   Pt is on medication for BPH only because his family is refusing medication for tx of his other diagnoses.    Pt is full code per accompanying MOLST form.    12/26: seen and eval. patient currently is not agitated. Very deconditioned. Pt seen and examined at bedside. As per overnight CT read, Loredo that was replaced in ED had balloon inflated in urethra so it was subsequently removed. Pt is unable to communicate and was combative earlier as per nursing.     ROS:   Unable to assess    PHYSICAL EXAM :  T(C): 36.3 (26 Dec 2017 12:00), Max: 37.9 (26 Dec 2017 06:15)  T(F): 97.4 (26 Dec 2017 12:00), Max: 100.3 (26 Dec 2017 06:15)  HR: 99 (26 Dec 2017 12:00) (94 - 100)  BP: 114/87 (26 Dec 2017 12:00) (114/87 - 165/73)  RR: 18 (26 Dec 2017 12:00) (18 - 18)  SpO2: 97% (26 Dec 2017 06:15) (97% - 98%)  GENERAL: frail, elderly, deconditioned. currently not combative.   HEAD:  NC/AT  EYES: EOMI, PERRLA, no scleral icterus  HEENT: Moist mucous membranes  CNS: patient not colaborating neuro exam  ABDOMEN: ST/ND/NT  GENITOURINARY: Currently no loredo, patient has a bloody urine as per nursing  EXTREMITIES:  2+ Peripheral Pulses, No clubbing, cyanosis, or edema  MUSCULOSKELETAL- Joints normal ROM, no Muscle or joint tenderness    Lab Results:    CBC Full  -  ( 25 Dec 2017 07:50 )  WBC Count : 11.2 K/uL  Hemoglobin : 8.9 g/dL  Hematocrit : 27.8 %  Platelet Count - Automated : 102 K/uL    PT/INR - ( 24 Dec 2017 22:29 )   PT: 10.7 sec;   INR: 0.99 ratio      PTT - ( 24 Dec 2017 22:29 )  PTT:29.1 sec    155<H>  |  124<H>  |  59<H>  ----------------------------<  107<H>  4.4   |  22  |  3.79<H>    Ca    8.3<L>      25 Dec 2017 18:15    TPro  6.8  /  Alb  2.9<L>  /  TBili  0.5  /  DBili  x   /  AST  13<L>  /  ALT  18  /  AlkPhos  97  12-24         IMAGING  < from: CT Abdomen and Pelvis No Cont (12.25.17 @ 07:07) >  EXAM:  CT ABDOMEN AND PELVIS                          Moderate to severe bilateral hydroureteronephrosis. Markedly irregular   thickening of the bladder wall with perivesicular inflammatory changes.   Enlarged prostate. Correlate with cystoscopy.  < end of copied text > Patient is a 83 y/o/m with w/ pmhx of HTN, dementia, MDD, BPH was BIBA from [facility] for decreasing kidney function (BUN/Cr = 74/4.10). Pt's baseline mental status is disoriented and combative. As per [facility] staff/documentation - previous BUN/Cr was 46/1.82. Renal sonogram 11/18/17 showed questionable hydronephrosis. Could not provide the reason behind the sonogram work up.  12/12 BUN/cr = 60/3.67. 12/21 BUN/cr = 73/3.87; at which point he was started on IVF hydration. On 12/22 BUN/Cr worse at 81/4.29, then 12/23 BUN/Cr 74/4.10 and had Loredo inserted that day 12/23/17.   Pt is on medication for BPH only because his family is refusing medication for tx of his other diagnoses.    Pt is full code per accompanying MOLST form.    12/26: seen and eval. patient currently is not agitated. Very deconditioned. Pt seen and examined at bedside. CT of the abdomen: Moderate to severe bilateral hydroureteronephrosis. Markedly irregular  thickening of the bladder wall with perivesicular inflammatory changes. Enlarged prostate. Correlate with cystoscopy. Urology following.       ROS:   Unable to assess    PHYSICAL EXAM :  T(C): 36.3 (26 Dec 2017 12:00), Max: 37.9 (26 Dec 2017 06:15)  T(F): 97.4 (26 Dec 2017 12:00), Max: 100.3 (26 Dec 2017 06:15)  HR: 99 (26 Dec 2017 12:00) (94 - 100)  BP: 114/87 (26 Dec 2017 12:00) (114/87 - 165/73)  RR: 18 (26 Dec 2017 12:00) (18 - 18)  SpO2: 97% (26 Dec 2017 06:15) (97% - 98%)  GENERAL: frail, elderly, deconditioned. currently not combative.   HEAD:  NC/AT  EYES: EOMI, PERRLA, no scleral icterus  HEENT: Moist mucous membranes  CNS: patient not colaborating neuro exam  ABDOMEN: ST/ND/NT  GENITOURINARY: Currently no loredo, patient has a bloody urine as per nursing  EXTREMITIES:  2+ Peripheral Pulses, No clubbing, cyanosis, or edema  MUSCULOSKELETAL- Joints normal ROM, no Muscle or joint tenderness    Lab Results:    CBC Full  -  ( 25 Dec 2017 07:50 )  WBC Count : 11.2 K/uL  Hemoglobin : 8.9 g/dL  Hematocrit : 27.8 %  Platelet Count - Automated : 102 K/uL    PT/INR - ( 24 Dec 2017 22:29 )   PT: 10.7 sec;   INR: 0.99 ratio      PTT - ( 24 Dec 2017 22:29 )  PTT:29.1 sec    155<H>  |  124<H>  |  59<H>  ----------------------------<  107<H>  4.4   |  22  |  3.79<H>    Ca    8.3<L>      25 Dec 2017 18:15    TPro  6.8  /  Alb  2.9<L>  /  TBili  0.5  /  DBili  x   /  AST  13<L>  /  ALT  18  /  AlkPhos  97  12-24         IMAGING  < from: CT Abdomen and Pelvis No Cont (12.25.17 @ 07:07) >  EXAM:  CT ABDOMEN AND PELVIS                          Moderate to severe bilateral hydroureteronephrosis. Markedly irregular   thickening of the bladder wall with perivesicular inflammatory changes.   Enlarged prostate. Correlate with cystoscopy.  < end of copied text >

## 2017-12-26 NOTE — CONSULT NOTE ADULT - ASSESSMENT
82 with a history of CKD, CVA, dementia, HTN here with hypernatremia and PRABHU, renal evaluation. PRABHU multifactorial from dehydration. Obstructive uropathy playing a role as well.      PRABHU  -Resume IVF hydration with d5W, currently off  -Urology follow up of obstructive uropathy  -Outpatient noted with hydro, son has reported this was issue in the past. I suspect he will have rising renal function without a catheter or some form or bypass of his prostate  -Not a candidate for HD due to aggressive behavior and sons wishes. But with repair of above two matters (dehydration/obstruction), doubt would need  -Meds for crcl ~15-20 ml/min  -NSAID avoidance    Hypernatremia  -Hypotonic IVF  -Oral intake as cleared    Would get palliative involved    d/c with Dr. Siu and team  d/c with [facility]

## 2017-12-26 NOTE — CONSULT NOTE ADULT - SUBJECTIVE AND OBJECTIVE BOX
Patient is a 82y Male whom presented to the hospital with a history of CKD, CVA, dementia, HTN here with hypernatremia and PRABHU, renal evaluation. Patient seen by me in the office recently for rising renal function. Was given IVF hydration at facility and loredo inserted, sent in today for persistent issues. Loredo trouble ON as well per notes, he pulled out this AM. d/c with Son NADIA and he would never want HD for dad as he understands his aggressive behavior and knows he would pull out a HD catheter. No IVF going now, loredo is out. Hematuria as per RN.    PAST MEDICAL & SURGICAL HISTORY:  Nasal bone fractures  Posture abnormality  Gait abnormality  Dementia with behavioral disturbance, unspecified dementia type  Macular degeneration  Depression  Hypertension  Dementia  No significant past surgical history      MEDICATIONS  (STANDING):  dextrose 5%. 1000 milliLiter(s) (100 mL/Hr) IV Continuous <Continuous>  tamsulosin 0.4 milliGRAM(s) Oral at bedtime    MEDICATIONS  (PRN):  acetaminophen   Tablet. 650 milliGRAM(s) Oral every 6 hours PRN Moderate Pain (4 - 6) and fever >101F      Allergies    No Known Allergies    Intolerances        SOCIAL HISTORY:  from [place]  no current etoh/cigg    FAMILY HISTORY:  No renal disease      REVIEW OF SYSTEMS:    UTO dementia    T(C): , Max: 37.9 (12-26-17 @ 06:15)  T(F): , Max: 100.3 (12-26-17 @ 06:15)  HR: 99 (12-26-17 @ 12:00)  BP: 114/87 (12-26-17 @ 12:00)  BP(mean): --  RR: 18 (12-26-17 @ 12:00)  SpO2: 97% (12-26-17 @ 06:15)  Wt(kg): --    12-25 @ 07:01  -  12-26 @ 07:00  --------------------------------------------------------  IN: 1128 mL / OUT: 0 mL / NET: 1128 mL          PHYSICAL EXAM:    Constitutional: frail/cachectic  HEENT: PERRLA, EOMI,  MMM  Neck: No LAD, No JVD  Respiratory: good aeration  Cardiovascular: S1 and S2, RRR  Gastrointestinal: BS+, soft, NT/ND  Extremities: No peripheral edema  Neurological: UTO confusion  : No Loredo  Skin: No rashes  Access: Not applicable        LABS:                        8.9    11.2  )-----------( 102      ( 25 Dec 2017 07:50 )             27.8     25 Dec 2017 18:15    155    |  124    |  59     ----------------------------<  107    4.4     |  22     |  3.79   25 Dec 2017 08:30    158    |  126    |  65     ----------------------------<  113    4.7     |  25     |  3.93   25 Dec 2017 06:00    158    |  125    |  67     ----------------------------<  121    4.7     |  25     |  3.95   24 Dec 2017 22:29    152    |  121    |  76     ----------------------------<  140    4.8     |  23     |  3.91     Ca    8.3        25 Dec 2017 18:15  Ca    8.4        25 Dec 2017 08:30  Ca    8.3        25 Dec 2017 06:00  Ca    8.0        24 Dec 2017 22:29    TPro  6.8    /  Alb  2.9    /  TBili  0.5    /  DBili  x      /  AST  13     /  ALT  18     /  AlkPhos  97     24 Dec 2017 22:29          Urine Studies:          RADIOLOGY & ADDITIONAL STUDIES:

## 2017-12-26 NOTE — PROGRESS NOTE ADULT - PROBLEM SELECTOR PLAN 1
- obstructive cause given pt's history of BPH  - urology consulted, placed coude catheter w/ return of clear urine  - monitor H&H given bleeding from previous catheter placement  - continue tamsulosin  - IV hydration

## 2017-12-26 NOTE — SWALLOW BEDSIDE ASSESSMENT ADULT - SLP GENERAL OBSERVATIONS
Pt seen at bedside. On encounter, 1;1 nursing supervision was being provided, Pt was awake but distractible, restless and combative at times, He could not be directed to structured communication tasks. However, he did periodically responsively verbalize when asked concrete personally relevant questions. At these times, his verbal responses were intelligible, and linguistically intact. Pt seen at bedside. On encounter, 1;1 nursing supervision was being provided, Pt was awake but distractible, restless and combative at times, He could not be directed to structured communication tasks. However, he did periodically responsively verbalize when asked concrete personally relevant questions. At these times, his verbal responses were intelligible, and linguistically intact.  However, his utterances were typically brief, fragmented and contextually inappropriate consistent with Cognitive Dysfunction with chronic component associated with dementia. Pt was a poor historian. ,

## 2017-12-26 NOTE — SWALLOW BEDSIDE ASSESSMENT ADULT - COMMENTS
Patient was admitted from [facility] with renal failure which is suspectedly due to obstructive uropathy associated with BPH. Hospital course is notable for ARF, and confusion with agitation. He has been given Haldol. Pt has an underlying history of advanced dementia with combativeness. Additional prior medical history is as stated below. The patient was admitted from [facility] with renal failure which is suspectedly due to obstructive uropathy associated with BPH. Hospital course is notable for ARF, and confusion with agitation. He has been given Haldol. Pt has an underlying history of advanced dementia with combativeness. Additional prior medical history is as stated below.

## 2017-12-26 NOTE — SWALLOW BEDSIDE ASSESSMENT ADULT - ORAL PHASE
Bolus formation/transfer were mildly prolonged/disorganized/discontinuous but mechanically functional, Piecemeal deglutition was evident. Slight tongue debris noted with coarse solids only.

## 2017-12-26 NOTE — PROGRESS NOTE ADULT - PROBLEM SELECTOR PLAN 3
- secondary to free qater deficit  - worse since admission (158 <- 152)  - continue D5@100  - serial BMPs

## 2017-12-26 NOTE — PROGRESS NOTE ADULT - PROBLEM SELECTOR PLAN 1
Likely obstructive cause given pt's history of BPH    Urology consulted, placed coude catheter w/ return of clear urine-patient removed the cath 12/26  Monitor H&H given bleeding from previous catheter placement  Continue IV hydration - D5 @ 100  Continue tamsulosin  Trend BMPs  f/u nephrology consult-appreciate  f/u urology consult

## 2017-12-26 NOTE — PROGRESS NOTE ADULT - PROBLEM SELECTOR PLAN 4
Stable  -PRN low dose PO of haldol no ativan  -please try to reorient before giving the antibiotics.

## 2017-12-26 NOTE — PROGRESS NOTE ADULT - SUBJECTIVE AND OBJECTIVE BOX
82M w/ PMH of HTN, dementia, MDD, BPH was BIBA from [facility] for decreasing kidney function (BUN/Cr = 74/4.10). Pt's baseline mental status is disoriented and combative. As per [facility] staff/documentation - previous BUN/Cr was 46/1.82. Renal sonogram 17 showed questionable hydronephrosis. Could not provide the reason behind the sonogram work up.   BUN/cr = 60/3.67.  BUN/cr = 73/3.87; at which point he was started on IVF hydration. On  BUN/Cr worse at 81/4.29, then  BUN/Cr 74/4.10 and had Chance inserted that day 17.   Pt is on medication for BPH only because his family is refusing medication for tx of his other diagnoses.    Pt is full code per accompanying MOLST form.    : Pt seen and examined at bedside. Was combative ripped the cath  received 0.5 mg of haloperidol he was comfortable and resting. D/W with the patient son his baseline is combative and aggressive he is not on any medication he becomes more aggressive if started on the medication. Overnight fever 100.3,no fever urine cultures NGTD.    ROS  Unable to assess    Vital Signs Last 24 Hrs  T(C): 36.3 (26 Dec 2017 12:00), Max: 37.9 (26 Dec 2017 06:15)  T(F): 97.4 (26 Dec 2017 12:00), Max: 100.3 (26 Dec 2017 06:15)  HR: 99 (26 Dec 2017 12:00) (97 - 100)  BP: 114/87 (26 Dec 2017 12:00) (114/87 - 165/73)  BP(mean): --  RR: 18 (26 Dec 2017 12:00) (18 - 18)  SpO2: 97% (26 Dec 2017 06:15) (97% - 98%)  PHYSICAL EXAM:    GENERAL: , NAD, well-groomed, well-developed. Resting after the haldol  HEAD:  NC/AT  EYES: EOMI, PERRLA, no scleral icterus  HEENT: Moist mucous membranes  NECK: Supple, No JVD  CNS: Motor Strength 5/5 B/L upper and lower extremities; DTRs 2+ intact   LUNG: Clear to auscultation bilaterally; No rales, rhonchi, wheezing, or rubs  HEART: RRR; No murmurs, rubs, or gallops  ABDOMEN: ST/ND/NT  GENITOURINARY- after removal of Chance + bright red blood per urethra  EXTREMITIES:  2+ Peripheral Pulses, No clubbing, cyanosis, or edema  MUSCULOSKELETAL- Joints normal ROM, no Muscle or joint tenderness    Vital Signs Last 24 Hrs  T(C): 36.3 (26 Dec 2017 12:00), Max: 37.9 (26 Dec 2017 06:15)  T(F): 97.4 (26 Dec 2017 12:00), Max: 100.3 (26 Dec 2017 06:15)  HR: 99 (26 Dec 2017 12:00) (97 - 100)  BP: 114/87 (26 Dec 2017 12:00) (114/87 - 165/73)  BP(mean): --  RR: 18 (26 Dec 2017 12:00) (18 - 18)  SpO2: 97% (26 Dec 2017 06:15) (97% - 98%)                          8.9    11.2  )-----------( 102      ( 25 Dec 2017 07:50 )             27.8     CBC Full  -  ( 25 Dec 2017 07:50 )  WBC Count : 11.2 K/uL  Hemoglobin : 8.9 g/dL  Hematocrit : 27.8 %  Platelet Count - Automated : 102 K/uL  Mean Cell Volume : 90.4 fl  Mean Cell Hemoglobin : 29.0 pg  Mean Cell Hemoglobin Concentration : 32.1 gm/dL  Auto Neutrophil # : x  Auto Lymphocyte # : x  Auto Monocyte # : x  Auto Eosinophil # : x  Auto Basophil # : x  Auto Neutrophil % : x  Auto Lymphocyte % : x  Auto Monocyte % : x  Auto Eosinophil % : x  Auto Basophil % : x        155<H>  |  124<H>  |  59<H>  ----------------------------<  107<H>  4.4   |  22  |  3.79<H>    Ca    8.3<L>      25 Dec 2017 18:15    TPro  6.8  /  Alb  2.9<L>  /  TBili  0.5  /  DBili  x   /  AST  13<L>  /  ALT  18  /  AlkPhos  97  12-24    PT/INR - ( 24 Dec 2017 22:29 )   PT: 10.7 sec;   INR: 0.99 ratio         PTT - ( 24 Dec 2017 22:29 )  PTT:29.1 sec      LIVER FUNCTIONS - ( 24 Dec 2017 22:29 )  Alb: 2.9 g/dL / Pro: 6.8 gm/dL / ALK PHOS: 97 U/L / ALT: 18 U/L / AST: 13 U/L / GGT: x           CAPILLARY BLOOD GLUCOSE              I&O's Detail    25 Dec 2017 07:  -  26 Dec 2017 07:00  --------------------------------------------------------  IN:    dextrose 5%.: 1128 mL  Total IN: 1128 mL    OUT:  Total OUT: 0 mL    Total NET: 1128 mL        I&O's Summary    25 Dec 2017 07:  -  26 Dec 2017 07:00  --------------------------------------------------------  IN: 1128 mL / OUT: 0 mL / NET: 1128 mL           PTT - ( 24 Dec 2017 22:29 )  PTT:29.1 sec  Urinalysis Basic - ( 24 Dec 2017 00:30 )    Color: Red / Appearance: Slightly Turbid / S.010 / pH: x  Gluc: x / Ketone: Negative  / Bili: Negative / Urobili: Negative mg/dL   Blood: x / Protein: 100 mg/dL / Nitrite: Negative   Leuk Esterase: Small / RBC: >50 /HPF / WBC 3-5   Sq Epi: x / Non Sq Epi: x / Bacteria: Occasional    IMAGING    < from: CT Abdomen and Pelvis No Cont (17 @ 07:07) >  EXAM:  CT ABDOMEN AND PELVIS                            PROCEDURE DATE:  2017  IMPRESSION:     Moderate to severe bilateral hydroureteronephrosis. Markedly irregular   thickening of the bladder wall with perivesicular inflammatory changes.   Enlarged prostate. Correlate with cystoscopy.    < end of copied text >

## 2017-12-27 DIAGNOSIS — R50.9 FEVER, UNSPECIFIED: ICD-10-CM

## 2017-12-27 LAB
ADD ON TEST-SPECIMEN IN LAB: SIGNIFICANT CHANGE UP
ANION GAP SERPL CALC-SCNC: 7 MMOL/L — SIGNIFICANT CHANGE UP (ref 5–17)
APPEARANCE UR: (no result)
BACTERIA # UR AUTO: (no result)
BASOPHILS # BLD AUTO: 0.1 K/UL — SIGNIFICANT CHANGE UP (ref 0–0.2)
BASOPHILS NFR BLD AUTO: 0.5 % — SIGNIFICANT CHANGE UP (ref 0–2)
BILIRUB UR-MCNC: NEGATIVE — SIGNIFICANT CHANGE UP
BUN SERPL-MCNC: 64 MG/DL — HIGH (ref 7–23)
CALCIUM SERPL-MCNC: 8.4 MG/DL — LOW (ref 8.5–10.1)
CHLORIDE SERPL-SCNC: 117 MMOL/L — HIGH (ref 96–108)
CO2 SERPL-SCNC: 24 MMOL/L — SIGNIFICANT CHANGE UP (ref 22–31)
COLOR SPEC: (no result)
COMMENT - URINE: SIGNIFICANT CHANGE UP
CREAT SERPL-MCNC: 5.06 MG/DL — HIGH (ref 0.5–1.3)
DIFF PNL FLD: (no result)
EOSINOPHIL # BLD AUTO: 0.1 K/UL — SIGNIFICANT CHANGE UP (ref 0–0.5)
EOSINOPHIL NFR BLD AUTO: 0.4 % — SIGNIFICANT CHANGE UP (ref 0–6)
EPI CELLS # UR: SIGNIFICANT CHANGE UP
GLUCOSE SERPL-MCNC: 185 MG/DL — HIGH (ref 70–99)
GLUCOSE UR QL: NEGATIVE MG/DL — SIGNIFICANT CHANGE UP
HCT VFR BLD CALC: 23.5 % — LOW (ref 39–50)
HCT VFR BLD CALC: 25.2 % — LOW (ref 39–50)
HCT VFR BLD CALC: 27.3 % — LOW (ref 39–50)
HGB BLD-MCNC: 7.7 G/DL — LOW (ref 13–17)
HGB BLD-MCNC: 7.8 G/DL — LOW (ref 13–17)
HGB BLD-MCNC: 8.4 G/DL — LOW (ref 13–17)
KETONES UR-MCNC: NEGATIVE — SIGNIFICANT CHANGE UP
LACTATE SERPL-SCNC: 2 MMOL/L — SIGNIFICANT CHANGE UP (ref 0.7–2)
LEUKOCYTE ESTERASE UR-ACNC: (no result)
LYMPHOCYTES # BLD AUTO: 1.3 K/UL — SIGNIFICANT CHANGE UP (ref 1–3.3)
LYMPHOCYTES # BLD AUTO: 8.8 % — LOW (ref 13–44)
MCHC RBC-ENTMCNC: 28.4 PG — SIGNIFICANT CHANGE UP (ref 27–34)
MCHC RBC-ENTMCNC: 28.4 PG — SIGNIFICANT CHANGE UP (ref 27–34)
MCHC RBC-ENTMCNC: 29.7 PG — SIGNIFICANT CHANGE UP (ref 27–34)
MCHC RBC-ENTMCNC: 30.8 GM/DL — LOW (ref 32–36)
MCHC RBC-ENTMCNC: 31.2 GM/DL — LOW (ref 32–36)
MCHC RBC-ENTMCNC: 32.8 GM/DL — SIGNIFICANT CHANGE UP (ref 32–36)
MCV RBC AUTO: 90.7 FL — SIGNIFICANT CHANGE UP (ref 80–100)
MCV RBC AUTO: 91 FL — SIGNIFICANT CHANGE UP (ref 80–100)
MCV RBC AUTO: 92.1 FL — SIGNIFICANT CHANGE UP (ref 80–100)
MONOCYTES # BLD AUTO: 1 K/UL — HIGH (ref 0–0.9)
MONOCYTES NFR BLD AUTO: 6.3 % — SIGNIFICANT CHANGE UP (ref 2–14)
NEUTROPHILS # BLD AUTO: 12.8 K/UL — HIGH (ref 1.8–7.4)
NEUTROPHILS NFR BLD AUTO: 84 % — HIGH (ref 43–77)
NITRITE UR-MCNC: NEGATIVE — SIGNIFICANT CHANGE UP
PH UR: 8 — SIGNIFICANT CHANGE UP (ref 5–8)
PHOSPHATE SERPL-MCNC: 2.4 MG/DL — LOW (ref 2.5–4.5)
PLATELET # BLD AUTO: 81 K/UL — LOW (ref 150–400)
PLATELET # BLD AUTO: 81 K/UL — LOW (ref 150–400)
PLATELET # BLD AUTO: 88 K/UL — LOW (ref 150–400)
POTASSIUM SERPL-MCNC: 4.3 MMOL/L — SIGNIFICANT CHANGE UP (ref 3.5–5.3)
POTASSIUM SERPL-SCNC: 4.3 MMOL/L — SIGNIFICANT CHANGE UP (ref 3.5–5.3)
PROT UR-MCNC: 100 MG/DL
RBC # BLD: 2.59 M/UL — LOW (ref 4.2–5.8)
RBC # BLD: 2.76 M/UL — LOW (ref 4.2–5.8)
RBC # BLD: 2.97 M/UL — LOW (ref 4.2–5.8)
RBC # FLD: 12.8 % — SIGNIFICANT CHANGE UP (ref 10.3–14.5)
RBC # FLD: 13.2 % — SIGNIFICANT CHANGE UP (ref 10.3–14.5)
RBC # FLD: 13.7 % — SIGNIFICANT CHANGE UP (ref 10.3–14.5)
RBC CASTS # UR COMP ASSIST: >50 /HPF (ref 0–4)
SODIUM SERPL-SCNC: 148 MMOL/L — HIGH (ref 135–145)
SP GR SPEC: 1.01 — SIGNIFICANT CHANGE UP (ref 1.01–1.02)
UROBILINOGEN FLD QL: NEGATIVE MG/DL — SIGNIFICANT CHANGE UP
WBC # BLD: 10.3 K/UL — SIGNIFICANT CHANGE UP (ref 3.8–10.5)
WBC # BLD: 12.2 K/UL — HIGH (ref 3.8–10.5)
WBC # BLD: 15.2 K/UL — HIGH (ref 3.8–10.5)
WBC # FLD AUTO: 10.3 K/UL — SIGNIFICANT CHANGE UP (ref 3.8–10.5)
WBC # FLD AUTO: 12.2 K/UL — HIGH (ref 3.8–10.5)
WBC # FLD AUTO: 15.2 K/UL — HIGH (ref 3.8–10.5)
WBC UR QL: (no result)

## 2017-12-27 PROCEDURE — 71010: CPT | Mod: 26

## 2017-12-27 PROCEDURE — 76870 US EXAM SCROTUM: CPT | Mod: 26

## 2017-12-27 RX ORDER — ACETAMINOPHEN 500 MG
650 TABLET ORAL ONCE
Qty: 0 | Refills: 0 | Status: COMPLETED | OUTPATIENT
Start: 2017-12-27 | End: 2017-12-27

## 2017-12-27 RX ORDER — HALOPERIDOL DECANOATE 100 MG/ML
0.5 INJECTION INTRAMUSCULAR ONCE
Qty: 0 | Refills: 0 | Status: COMPLETED | OUTPATIENT
Start: 2017-12-27 | End: 2017-12-27

## 2017-12-27 RX ORDER — SODIUM CHLORIDE 9 MG/ML
500 INJECTION INTRAMUSCULAR; INTRAVENOUS; SUBCUTANEOUS ONCE
Qty: 0 | Refills: 0 | Status: COMPLETED | OUTPATIENT
Start: 2017-12-27 | End: 2017-12-27

## 2017-12-27 RX ORDER — PIPERACILLIN AND TAZOBACTAM 4; .5 G/20ML; G/20ML
3.38 INJECTION, POWDER, LYOPHILIZED, FOR SOLUTION INTRAVENOUS EVERY 12 HOURS
Qty: 0 | Refills: 0 | Status: DISCONTINUED | OUTPATIENT
Start: 2017-12-27 | End: 2017-12-28

## 2017-12-27 RX ADMIN — HALOPERIDOL DECANOATE 0.5 MILLIGRAM(S): 100 INJECTION INTRAMUSCULAR at 01:48

## 2017-12-27 RX ADMIN — Medication 650 MILLIGRAM(S): at 22:35

## 2017-12-27 RX ADMIN — SODIUM CHLORIDE 100 MILLILITER(S): 9 INJECTION, SOLUTION INTRAVENOUS at 10:44

## 2017-12-27 RX ADMIN — Medication 650 MILLIGRAM(S): at 06:24

## 2017-12-27 RX ADMIN — SODIUM CHLORIDE 1000 MILLILITER(S): 9 INJECTION INTRAMUSCULAR; INTRAVENOUS; SUBCUTANEOUS at 14:57

## 2017-12-27 RX ADMIN — SODIUM CHLORIDE 100 MILLILITER(S): 9 INJECTION, SOLUTION INTRAVENOUS at 01:02

## 2017-12-27 RX ADMIN — Medication 650 MILLIGRAM(S): at 17:18

## 2017-12-27 RX ADMIN — PIPERACILLIN AND TAZOBACTAM 25 GRAM(S): 4; .5 INJECTION, POWDER, LYOPHILIZED, FOR SOLUTION INTRAVENOUS at 21:45

## 2017-12-27 RX ADMIN — PIPERACILLIN AND TAZOBACTAM 25 GRAM(S): 4; .5 INJECTION, POWDER, LYOPHILIZED, FOR SOLUTION INTRAVENOUS at 10:44

## 2017-12-27 RX ADMIN — TAMSULOSIN HYDROCHLORIDE 0.4 MILLIGRAM(S): 0.4 CAPSULE ORAL at 21:46

## 2017-12-27 NOTE — PROGRESS NOTE ADULT - PROBLEM SELECTOR PROBLEM 2
Benign prostatic hyperplasia, unspecified whether lower urinary tract symptoms present Acute renal failure, unspecified acute renal failure type

## 2017-12-27 NOTE — PROGRESS NOTE ADULT - SUBJECTIVE AND OBJECTIVE BOX
Patient is a 82y Male, no new events. Fevers+    REVIEW OF SYSTEMS:    UTO due to dementia    MEDICATIONS  (STANDING):  dextrose 5%. 1000 milliLiter(s) (100 mL/Hr) IV Continuous <Continuous>  piperacillin/tazobactam IVPB. 3.375 Gram(s) IV Intermittent every 12 hours  tamsulosin 0.4 milliGRAM(s) Oral at bedtime    MEDICATIONS  (PRN):  acetaminophen   Tablet. 650 milliGRAM(s) Oral every 6 hours PRN Moderate Pain (4 - 6) and fever >101F        T(C): , Max: 39.3 (17 @ 21:08)  T(F): , Max: 102.8 (17 @ 23:12)  HR: 98 (17 @ 05:05)  BP: 103/55 (17 @ 09:05)  BP(mean): --  RR: 18 (17 @ 09:05)  SpO2: 97% (17 @ 09:05)  Wt(kg): --        PHYSICAL EXAM:    Constitutional: frail  HEENT: PERRLA, EOMI,  MMM  Neck: No LAD, No JVD  Respiratory: good aeration  Cardiovascular: S1 and S2, RRR  Gastrointestinal: BS+, soft, NT/ND  Extremities: No peripheral edema  Neurological: Asleep but arousable  : No Chance  Skin: No rashes  Access: Not applicable        LABS:                        7.8    12.2  )-----------( 81       ( 27 Dec 2017 06:48 )             25.2     27 Dec 2017 06:48    148    |  117    |  64     ----------------------------<  185    4.3     |  24     |  5.06   25 Dec 2017 18:15    155    |  124    |  59     ----------------------------<  107    4.4     |  22     |  3.79   25 Dec 2017 08:30    158    |  126    |  65     ----------------------------<  113    4.7     |  25     |  3.93   25 Dec 2017 06:00    158    |  125    |  67     ----------------------------<  121    4.7     |  25     |  3.95   24 Dec 2017 22:29    152    |  121    |  76     ----------------------------<  140    4.8     |  23     |  3.91     Ca    8.4        27 Dec 2017 06:48  Ca    8.3        25 Dec 2017 18:15  Ca    8.4        25 Dec 2017 08:30  Ca    8.3        25 Dec 2017 06:00  Ca    8.0        24 Dec 2017 22:29  Phos  2.4       27 Dec 2017 06:48    TPro  6.8    /  Alb  2.9    /  TBili  0.5    /  DBili  x      /  AST  13     /  ALT  18     /  AlkPhos  97     24 Dec 2017 22:29          Urine Studies:  Urinalysis Basic - ( 27 Dec 2017 02:40 )    Color: Red / Appearance: Slightly Turbid / S.010 / pH: x  Gluc: x / Ketone: Negative  / Bili: Negative / Urobili: Negative mg/dL   Blood: x / Protein: 100 mg/dL / Nitrite: Negative   Leuk Esterase: Moderate / RBC: >50 /HPF / WBC 6-10   Sq Epi: x / Non Sq Epi: Occasional / Bacteria: Many            RADIOLOGY & ADDITIONAL STUDIES:

## 2017-12-27 NOTE — PROGRESS NOTE ADULT - PROBLEM SELECTOR PROBLEM 3
Hypernatremia Benign prostatic hyperplasia, unspecified whether lower urinary tract symptoms present

## 2017-12-27 NOTE — PROGRESS NOTE ADULT - SUBJECTIVE AND OBJECTIVE BOX
82M w/ PMH of HTN, dementia, MDD, BPH was BIBA from [facility] for decreasing kidney function (BUN/Cr = 74/4.10). Pt's baseline mental status is disoriented and combative. As per [facility] staff/documentation - previous BUN/Cr was 46/1.82. Renal sonogram 17 showed questionable hydronephrosis. Could not provide the reason behind the sonogram work up.   BUN/cr = 60/3.67.  BUN/cr = 73/3.87; at which point he was started on IVF hydration. On  BUN/Cr worse at 81/4.29, then  BUN/Cr 74/4.10 and had Chance inserted that day 17.   Pt is on medication for BPH only because his family is refusing medication for tx of his other diagnoses.    Pt is full code per accompanying MOLST form.    : Pt seen and examined at bedside. Was combative ripped the cath  received 0.5 mg of haloperidol he was comfortable and resting. D/W with the patient son his baseline is combative and aggressive he is not on any medication he becomes more aggressive if started on the medication. Overnight fever 100.3,no fever urine cultures NGTD.    ROS  Unable to assess      PHYSICAL EXAM:  T(C): 36.3 (26 Dec 2017 12:00), Max: 37.9 (26 Dec 2017 06:15)  T(F): 97.4 (26 Dec 2017 12:00), Max: 100.3 (26 Dec 2017 06:15)  HR: 99 (26 Dec 2017 12:00) (97 - 100)  BP: 114/87 (26 Dec 2017 12:00) (114/87 - 165/73)  RR: 18 (26 Dec 2017 12:00) (18 - 18)  SpO2: 97% (26 Dec 2017 06:15) (97% - 98%)  GENERAL: , NAD, well-groomed, well-developed. Resting after the haldol  HEAD:  NC/AT  EYES: EOMI, PERRLA, no scleral icterus  HEENT: Moist mucous membranes  NECK: Supple, No JVD  CNS: Motor Strength 5/5 B/L upper and lower extremities; DTRs 2+ intact   LUNG: Clear to auscultation bilaterally; No rales, rhonchi, wheezing, or rubs  HEART: RRR; No murmurs, rubs, or gallops  ABDOMEN: ST/ND/NT  GENITOURINARY- after removal of Chance + bright red blood per urethra  EXTREMITIES:  2+ Peripheral Pulses, No clubbing, cyanosis, or edema  MUSCULOSKELETAL- Joints normal ROM, no Muscle or joint tenderness                CBC Full  -  ( 27 Dec 2017 06:48 )  WBC Count : 12.2 K/uL  Hemoglobin : 7.8 g/dL  Hematocrit : 25.2 %  Platelet Count - Automated : 81 K/uL    Urinalysis Basic - ( 27 Dec 2017 02:40 )    Color: Red / Appearance: Slightly Turbid / S.010 / pH: x  Gluc: x / Ketone: Negative  / Bili: Negative / Urobili: Negative mg/dL   Blood: x / Protein: 100 mg/dL / Nitrite: Negative   Leuk Esterase: Moderate / RBC: >50 /HPF / WBC 6-10   Sq Epi: x / Non Sq Epi: Occasional / Bacteria: Many      148<H>  |  117<H>  |  64<H>  ----------------------------<  185<H>  4.3   |  24  |  5.06<H>    Ca    8.4<L>      27 Dec 2017 06:48  Phos  2.4     12-        < from: CT Abdomen and Pelvis No Cont (12.25.17 @ 07:07) >  EXAM:  CT ABDOMEN AND PELVIS                            PROCEDURE DATE:  2017  IMPRESSION:     Moderate to severe bilateral hydroureteronephrosis. Markedly irregular   thickening of the bladder wall with perivesicular inflammatory changes.   Enlarged prostate. Correlate with cystoscopy.    < end of copied text > 82M w/ PMH of HTN, dementia, MDD, BPH was BIBA from [facility] for decreasing kidney function (BUN/Cr = 74/4.10). Pt's baseline mental status is disoriented and combative. As per [facility] staff/documentation - previous BUN/Cr was 46/1.82. Renal sonogram 17 showed questionable hydronephrosis. Could not provide the reason behind the sonogram work up.   BUN/cr = 60/3.67.  BUN/cr = 73/3.87; at which point he was started on IVF hydration. On  BUN/Cr worse at 81/4.29, then  BUN/Cr 74/4.10 and had Loredo inserted that day 17.   Pt is on medication for BPH only because his family is refusing medication for tx of his other diagnoses.    Pt is full code per accompanying MOLST form.    : Pt seen and examined at bedside. Was combative ripped the cath  received 0.5 mg of haloperidol he was comfortable and resting. D/W with the patient son his baseline is combative and aggressive he is not on any medication he becomes more aggressive if started on the medication. Overnight fever 100.3,no fever urine cultures NGTD.    : seen and eval. overnight fevers. worsening renal function. Poor historian and patient is not waking up this am. Scrotal region swollen with red/blue discoloration. Care discussed with Dr. Vegas and asked if the loredo can be placed in.     ROS  Unable to assess      PHYSICAL EXAM:  T(C): 37.9 (27 Dec 2017 11:27), Max: 39.3 (26 Dec 2017 21:08)  T(F): 100.2 (27 Dec 2017 11:27), Max: 102.8 (26 Dec 2017 23:12)  HR: 73 (27 Dec 2017 11:27) (73 - 98)  BP: 98/56 (27 Dec 2017 11:27) (98/56 - 120/53)  RR: 18 (27 Dec 2017 11:27) (18 - 19)  SpO2: 97% (27 Dec 2017 11:27) (97% - 100%)  GENERAL: , NAD, well-groomed, well-developed. Resting after the haldol  HEAD:  NC/AT  EYES: EOMI, PERRLA, no scleral icterus  HEENT: Moist mucous membranes  NECK: Supple, No JVD  CNS: Motor Strength 5/5 B/L upper and lower extremities; DTRs 2+ intact   LUNG: Clear to auscultation bilaterally; No rales, rhonchi, wheezing, or rubs  HEART: RRR; No murmurs, rubs, or gallops  ABDOMEN: ST/ND/NT  GENITOURINARY- after removal of Loredo + bright red blood per urethra  EXTREMITIES:  2+ Peripheral Pulses, No clubbing, cyanosis, or edema  MUSCULOSKELETAL- Joints normal ROM, no Muscle or joint tenderness                CBC Full  -  ( 27 Dec 2017 06:48 )  WBC Count : 12.2 K/uL  Hemoglobin : 7.8 g/dL  Hematocrit : 25.2 %  Platelet Count - Automated : 81 K/uL    Urinalysis Basic - ( 27 Dec 2017 02:40 )    Color: Red / Appearance: Slightly Turbid / S.010 / pH: x  Gluc: x / Ketone: Negative  / Bili: Negative / Urobili: Negative mg/dL   Blood: x / Protein: 100 mg/dL / Nitrite: Negative   Leuk Esterase: Moderate / RBC: >50 /HPF / WBC 6-10   Sq Epi: x / Non Sq Epi: Occasional / Bacteria: Many      148<H>  |  117<H>  |  64<H>  ----------------------------<  185<H>  4.3   |  24  |  5.06<H>    Ca    8.4<L>      27 Dec 2017 06:48  Phos  2.4     12-        < from: CT Abdomen and Pelvis No Cont (. @ 07:07) >  EXAM:  CT ABDOMEN AND PELVIS                            PROCEDURE DATE:  2017  IMPRESSION:     Moderate to severe bilateral hydroureteronephrosis. Markedly irregular   thickening of the bladder wall with perivesicular inflammatory changes.   Enlarged prostate. Correlate with cystoscopy.    < end of copied text >

## 2017-12-27 NOTE — PROGRESS NOTE ADULT - ASSESSMENT
82M w/ PMH as above admitted with PRABHU due to likely obstructive uropathy. Will need to speak with HCP today about goals of care for the patient given he can not tolerate catheter placement (pulls everything out) and is not good candidate for HD as per nephrology.

## 2017-12-27 NOTE — PROGRESS NOTE ADULT - PROBLEM SELECTOR PLAN 2
Stable.    Continue tamsulosin  f/u urology recs Likely obstructive cause given pt's history of BPH. Getting worse (Cr today 5.06 <- 3.79)    Urology consulted, placed coude that patient then removed (combative/dementia). Re-consulted, recommending palliative care consult due to likelihood of patient removing any catheter that is placed.  Monitor H&H given bleeding from previous catheter placement  Continue IV hydration - D5 @ 100  Continue tamsulosin  Trend BMPs  f/u nephrology consult - recommending fix obstructive uropathy from BPH and follow up urology

## 2017-12-27 NOTE — PROGRESS NOTE ADULT - PROBLEM SELECTOR PLAN 1
- Likely obstructive cause given pt's history of BPH  - Urology consulted, placed coude catheter w/ return of clear urine-patient removed the cath 12/26  - continue with IV fluids  - continue tamsulosin  - nephrology following - Dr. Arjun Bishop  - care discussed with Dr. Vegas this am, will come and evaluate the patient (Discussed on 5S)

## 2017-12-27 NOTE — PROGRESS NOTE ADULT - ASSESSMENT
82 with a history of CKD, CVA, dementia, HTN here with hypernatremia and PRABHU, renal evaluation. PRABHU multifactorial from dehydration. Obstructive uropathy playing a role as well.    PRABHU  -Renal function rising depsite IVF and improving Na+, likely obstruction  -Urology was contacted and aware, pending possible loredo replacement. Needs relief of obstruction today to prevent progressive PRABHU.  -Not a candidate for HD due to aggressive behavior and sons wishes.  -Meds for crcl ~15-20 ml/min  -NSAID avoidance    Hypernatremia  -Hypotonic IVF as ordered  -Oral intake as cleared    Anemia  -Management per medical team      Would get palliative involved    d/c with residents, attending and medical team  d/c with staff

## 2017-12-27 NOTE — PROGRESS NOTE ADULT - PROBLEM SELECTOR PLAN 3
Improving, today 148 <- 155    Continue D5@100  Serial BMPs Stable.    Continue tamsulosin  f/u urology recs

## 2017-12-27 NOTE — PROGRESS NOTE ADULT - SUBJECTIVE AND OBJECTIVE BOX
82M w/ PMH of HTN, dementia, MDD, BPH was BIBA from [facility] for decreasing kidney function (BUN/Cr = 74/4.10). Pt's baseline mental status is disoriented and combative. As per [facility] staff/documentation - previous BUN/Cr was 46/1.82. Renal sonogram 17 showed questionable hydronephrosis. Could not provide the reason behind the sonogram work up.   BUN/cr = 60/3.67.  BUN/cr = 73/3.87; at which point he was started on IVF hydration. On  BUN/Cr worse at 81/4.29, then  BUN/Cr 74/4.10 and had Chance inserted that day 17.   Pt is on medication for BPH only because his family is refusing medication for tx of his other diagnoses.    Pt is full code per accompanying MOLST form.    17: Pt seen and examined at bedside. Pt is AOx1 and is not responding to vocal or tactile stimuli. Overnight patient was febrile despite multiple doses of tylenol. Pt still febrile this AM.    ROS  Unable to assess    Vital Signs Last 24 Hrs  T(C): 37.9 (17 @ 11:27)  T(F): 100.2 (17 @ 11:27), Max: 102.8 (17 @ 23:12)  HR: 73 (17 @ 11:27) (73 - 98)  BP: 98/56 (17 @ 11:27)  BP(mean): --  RR: 18 (17 @ 11:27) (18 - 19)  SpO2: 97% (17 @ 11:27) (97% - 100%)  Wt(kg): --    PHYSICAL EXAM:    GENERAL: AOx1, NAD, well-groomed, well-developed. Combative  HEAD:  NC/AT  EYES: EOMI, PERRLA, no scleral icterus  HEENT: Moist mucous membranes  NECK: Supple, No JVD  CNS: Motor Strength 5/5 B/L upper and lower extremities; DTRs 2+ intact   LUNG: Clear to auscultation bilaterally; No rales, rhonchi, wheezing, or rubs  HEART: RRR; No murmurs, rubs, or gallops  ABDOMEN: ST/ND/NT  GENITOURINARY- coude catheter out, no blood @ urethral meatus  EXTREMITIES:  2+ Peripheral Pulses, No clubbing, cyanosis, or edema  MUSCULOSKELETAL- Joints normal ROM, no Muscle or joint tenderness    Lab Results:                                            7.8                   Neurophils% (auto):   x      ( @ 06:48):    12.2 )-----------(81           Lymphocytes% (auto):  x                                             25.2                   Eosinphils% (auto):   x        Manual%: Neutrophils x    ; Lymphocytes x    ; Eosinophils x    ; Bands%: x    ; Blasts x         Differential:	[] Automated		[] Manual        148<H>  |  117<H>  |  64<H>  ----------------------------<  185<H>  4.3   |  24  |  5.06<H>    Ca    8.4<L>      27 Dec 2017 06:48  Phos  2.4         Urinalysis Basic - ( 27 Dec 2017 02:40 )    Color: Red / Appearance: Slightly Turbid / S.010 / pH: x  Gluc: x / Ketone: Negative  / Bili: Negative / Urobili: Negative mg/dL   Blood: x / Protein: 100 mg/dL / Nitrite: Negative   Leuk Esterase: Moderate / RBC: >50 /HPF / WBC 6-10   Sq Epi: x / Non Sq Epi: Occasional / Bacteria: Many      Color: Red / Appearance: Slightly Turbid / S.010 / pH: x  Gluc: x / Ketone: Negative  / Bili: Negative / Urobili: Negative mg/dL   Blood: x / Protein: 100 mg/dL / Nitrite: Negative   Leuk Esterase: Small / RBC: >50 /HPF / WBC 3-5   Sq Epi: x / Non Sq Epi: x / Bacteria: Occasional    IMAGING    < from: CT Abdomen and Pelvis No Cont (17 @ 07:07) >  EXAM:  CT ABDOMEN AND PELVIS                            PROCEDURE DATE:  2017  IMPRESSION:     Moderate to severe bilateral hydroureteronephrosis. Markedly irregular   thickening of the bladder wall with perivesicular inflammatory changes.   Enlarged prostate. Correlate with cystoscopy.    < end of copied text >

## 2017-12-27 NOTE — PROGRESS NOTE ADULT - PROBLEM SELECTOR PLAN 1
Likely obstructive cause given pt's history of BPH    Urology consulted, placed coude that patient then removed (combative/dementia). Re-consulted, recommending palliative care consult due to likelihood of patient removing any catheter that is placed.  Monitor H&H given bleeding from previous catheter placement  Continue IV hydration - D5 @ 100  Continue tamsulosin  Trend BMPs  f/u nephrology consult - recommending fix obstructive uropathy from BPH and follow up urology New onset, started overnight 12/26 - 12/27. Likely urinary in origin.    Started zosyn  Continue tylenol PRN fevers

## 2017-12-27 NOTE — PROGRESS NOTE ADULT - PROBLEM SELECTOR PLAN 2
- continue tamsulosin  - patient needs a placement of a loredo catheter in a setting of worsening renal function (care discussed with Dr. Vegas on 5S this am)  - bladder scan STAT

## 2017-12-28 DIAGNOSIS — D64.9 ANEMIA, UNSPECIFIED: ICD-10-CM

## 2017-12-28 LAB
ALLERGY+IMMUNOLOGY DIAG STUDY NOTE: SIGNIFICANT CHANGE UP
ANION GAP SERPL CALC-SCNC: 11 MMOL/L — SIGNIFICANT CHANGE UP (ref 5–17)
BUN SERPL-MCNC: 73 MG/DL — HIGH (ref 7–23)
CALCIUM SERPL-MCNC: 8.1 MG/DL — LOW (ref 8.5–10.1)
CHLORIDE SERPL-SCNC: 114 MMOL/L — HIGH (ref 96–108)
CO2 SERPL-SCNC: 20 MMOL/L — LOW (ref 22–31)
CREAT SERPL-MCNC: 5.73 MG/DL — HIGH (ref 0.5–1.3)
GLUCOSE SERPL-MCNC: 140 MG/DL — HIGH (ref 70–99)
GRAM STN FLD: SIGNIFICANT CHANGE UP
HCT VFR BLD CALC: 21.3 % — LOW (ref 39–50)
HGB BLD-MCNC: 6.7 G/DL — CRITICAL LOW (ref 13–17)
MCHC RBC-ENTMCNC: 28.6 PG — SIGNIFICANT CHANGE UP (ref 27–34)
MCHC RBC-ENTMCNC: 31.7 GM/DL — LOW (ref 32–36)
MCV RBC AUTO: 90.3 FL — SIGNIFICANT CHANGE UP (ref 80–100)
METHOD TYPE: SIGNIFICANT CHANGE UP
PLATELET # BLD AUTO: 77 K/UL — LOW (ref 150–400)
POTASSIUM SERPL-MCNC: 4.3 MMOL/L — SIGNIFICANT CHANGE UP (ref 3.5–5.3)
POTASSIUM SERPL-SCNC: 4.3 MMOL/L — SIGNIFICANT CHANGE UP (ref 3.5–5.3)
PROTEUS SP DNA BLD POS QL NAA+NON-PROBE: SIGNIFICANT CHANGE UP
RBC # BLD: 2.36 M/UL — LOW (ref 4.2–5.8)
RBC # FLD: 13 % — SIGNIFICANT CHANGE UP (ref 10.3–14.5)
SODIUM SERPL-SCNC: 145 MMOL/L — SIGNIFICANT CHANGE UP (ref 135–145)
WBC # BLD: 8.5 K/UL — SIGNIFICANT CHANGE UP (ref 3.8–10.5)
WBC # FLD AUTO: 8.5 K/UL — SIGNIFICANT CHANGE UP (ref 3.8–10.5)

## 2017-12-28 PROCEDURE — 74176 CT ABD & PELVIS W/O CONTRAST: CPT | Mod: 26

## 2017-12-28 RX ORDER — CEFTRIAXONE 500 MG/1
1000 INJECTION, POWDER, FOR SOLUTION INTRAMUSCULAR; INTRAVENOUS EVERY 24 HOURS
Qty: 0 | Refills: 0 | Status: COMPLETED | OUTPATIENT
Start: 2017-12-28 | End: 2018-01-01

## 2017-12-28 RX ORDER — SODIUM CHLORIDE 9 MG/ML
1000 INJECTION, SOLUTION INTRAVENOUS
Qty: 0 | Refills: 0 | Status: COMPLETED | OUTPATIENT
Start: 2017-12-28 | End: 2017-12-29

## 2017-12-28 RX ORDER — ACETAMINOPHEN 500 MG
650 TABLET ORAL EVERY 6 HOURS
Qty: 0 | Refills: 0 | Status: DISCONTINUED | OUTPATIENT
Start: 2017-12-28 | End: 2018-01-03

## 2017-12-28 RX ORDER — HALOPERIDOL DECANOATE 100 MG/ML
1 INJECTION INTRAMUSCULAR ONCE
Qty: 0 | Refills: 0 | Status: COMPLETED | OUTPATIENT
Start: 2017-12-28 | End: 2017-12-28

## 2017-12-28 RX ORDER — ACETAMINOPHEN 500 MG
650 TABLET ORAL ONCE
Qty: 0 | Refills: 0 | Status: COMPLETED | OUTPATIENT
Start: 2017-12-28 | End: 2017-12-28

## 2017-12-28 RX ORDER — FINASTERIDE 5 MG/1
5 TABLET, FILM COATED ORAL DAILY
Qty: 0 | Refills: 0 | Status: DISCONTINUED | OUTPATIENT
Start: 2017-12-28 | End: 2018-01-03

## 2017-12-28 RX ORDER — TAMSULOSIN HYDROCHLORIDE 0.4 MG/1
0.8 CAPSULE ORAL AT BEDTIME
Qty: 0 | Refills: 0 | Status: DISCONTINUED | OUTPATIENT
Start: 2017-12-28 | End: 2018-01-03

## 2017-12-28 RX ORDER — CEFTRIAXONE 500 MG/1
1 INJECTION, POWDER, FOR SOLUTION INTRAMUSCULAR; INTRAVENOUS EVERY 24 HOURS
Qty: 0 | Refills: 0 | Status: DISCONTINUED | OUTPATIENT
Start: 2017-12-28 | End: 2017-12-28

## 2017-12-28 RX ADMIN — Medication 650 MILLIGRAM(S): at 06:31

## 2017-12-28 RX ADMIN — Medication 650 MILLIGRAM(S): at 15:35

## 2017-12-28 RX ADMIN — Medication 650 MILLIGRAM(S): at 21:31

## 2017-12-28 RX ADMIN — TAMSULOSIN HYDROCHLORIDE 0.8 MILLIGRAM(S): 0.4 CAPSULE ORAL at 21:22

## 2017-12-28 RX ADMIN — SODIUM CHLORIDE 60 MILLILITER(S): 9 INJECTION, SOLUTION INTRAVENOUS at 21:23

## 2017-12-28 RX ADMIN — FINASTERIDE 5 MILLIGRAM(S): 5 TABLET, FILM COATED ORAL at 15:32

## 2017-12-28 RX ADMIN — PIPERACILLIN AND TAZOBACTAM 25 GRAM(S): 4; .5 INJECTION, POWDER, LYOPHILIZED, FOR SOLUTION INTRAVENOUS at 11:01

## 2017-12-28 RX ADMIN — CEFTRIAXONE 1000 MILLIGRAM(S): 500 INJECTION, POWDER, FOR SOLUTION INTRAMUSCULAR; INTRAVENOUS at 17:53

## 2017-12-28 RX ADMIN — HALOPERIDOL DECANOATE 1 MILLIGRAM(S): 100 INJECTION INTRAMUSCULAR at 11:00

## 2017-12-28 NOTE — PROGRESS NOTE ADULT - PROBLEM SELECTOR PLAN 2
- continue tamsulosin  - patient needs a placement of a loredo catheter in a setting of worsening renal function (care discussed with Dr. Vegas on 5S this am)  - bladder scan STAT - continue tamsulosin  - catheter placed 12/28 by me, 250 cc of urine out - will obtain urine cultures immediately in a setting of fevers to make sure it is not CAUTI

## 2017-12-28 NOTE — CONSULT NOTE ADULT - SUBJECTIVE AND OBJECTIVE BOX
HPI:  81yo M w/PMHx of HTN, Dementia, Major Depressive  Disorder, BPH, admitted on  for evaluation of altered mental status, agitation. The patient has been having fever since admission, had loredo catheter placed on admission, however, this was traumatically removed with subsequent swelling of scrotum. Patient intermittently combative, unable to obtain history and history per medical record. Labwork within the last 24 hours shows significant drop in hgb/hct of unclear etiology.            PMH: as above  PSH: as above  Meds: per reconcilation sheet, noted below  MEDICATIONS  (STANDING):  cefTRIAXone   IVPB 1 Gram(s) IV Intermittent every 24 hours  dextrose 5%. 1000 milliLiter(s) (60 mL/Hr) IV Continuous <Continuous>  finasteride 5 milliGRAM(s) Oral daily  tamsulosin 0.8 milliGRAM(s) Oral at bedtime    MEDICATIONS  (PRN):  acetaminophen   Tablet. 650 milliGRAM(s) Oral every 6 hours PRN Moderate Pain (4 - 6) and fever >101F  acetaminophen  Suppository 650 milliGRAM(s) Rectal every 6 hours PRN For Temp greater than 38 C (100.4 F)    Allergies    No Known Allergies    Intolerances      Social: unknown  FAMILY HISTORY:  No pertinent family history in first degree relatives    ROS: unable to obtain secondary to patient medical condition   Vital Signs Last 24 Hrs  T(C): 38.4 (28 Dec 2017 15:03), Max: 39.2 (27 Dec 2017 17:11)  T(F): 101.1 (28 Dec 2017 15:03), Max: 102.6 (27 Dec 2017 17:11)  HR: 89 (28 Dec 2017 15:03) (85 - 105)  BP: 84/50 (28 Dec 2017 15:03) (84/50 - 147/74)  BP(mean): --  RR: 18 (28 Dec 2017 14:35) (16 - 20)  SpO2: 100% (28 Dec 2017 15:03) (100% - 100%)  Daily     Daily   Constitutional: frail looking  HEENT: NC/AT, EOMI, PERRLA  Neck: supple  Respiratory: clear, no r/r/w, though somnolent poor inspiratory effort  Cardiovascular: S1S2 regular, no murmurs  Abdomen: soft, not tender, not distended, positive BS  Genitourinary: loredo catheter in place, edematous scrotum  Rectal: deferred  Musculoskeletal: no muscle tenderness, no joint swelling or tenderness  Neurological: somnolent, moving all extremities, no focal deficits  Skin: no rashes                          6.7    8.5   )-----------( 77       ( 28 Dec 2017 07:34 )             21.3         145  |  114<H>  |  73<H>  ----------------------------<  140<H>  4.3   |  20<L>  |  5.73<H>    Ca    8.1<L>      28 Dec 2017 07:34  Phos  2.4              Urinalysis Basic - ( 27 Dec 2017 02:40 )    Color: Red / Appearance: Slightly Turbid / S.010 / pH: x  Gluc: x / Ketone: Negative  / Bili: Negative / Urobili: Negative mg/dL   Blood: x / Protein: 100 mg/dL / Nitrite: Negative   Leuk Esterase: Moderate / RBC: >50 /HPF / WBC 6-10   Sq Epi: x / Non Sq Epi: Occasional / Bacteria: Many    Culture - Urine (17 @ 23:27)    Specimen Source: .Urine Clean Catch (Midstream)    Culture Results:   No growth      Culture - Blood (17 @ 06:48)    Specimen Source: .Blood None    Culture Results:   No growth to date.    Culture - Blood (17 @ 02:27)    Specimen Source: .Blood None    Culture Results:   No growth to date.              Radiology:< from: CT Abdomen and Pelvis No Cont (17 @ 12:24) >    EXAM:  CT ABDOMEN AND PELVIS                            PROCEDURE DATE:  2017          INTERPRETATION:  Clinical information: Troponin hemoglobin, assess for   retroperitoneal hemorrhage    Comparison:     PROCEDURE:     CT of the Abdomen and Pelvis was performed without intravenous contrast.    Evaluation of abdominal and pelvic viscera, lymph nodes and vasculature   is limited without intravenous contrast.    FINDINGS:    LOWER CHEST: Trace effusions and atelectasis    ABDOMEN:  LIVER: within normal limits  BILE DUCTS: normal caliber  GALLBLADDER: no calcified gallstones. normal caliber wall  PANCREAS: within normal limits  SPLEEN: within normal limits  ADRENALS: within normal limits  KIDNEYS: Moderate bilateral hydronephrosis,grossly stable.    PELVIS:  REPRODUCTIVE ORGANS: no pelvic masses  BLADDER: Thickened, contracted around Loredo catheter. Perivesical Fat   stranding, appears grossly similar to prior.    BOWEL: within normal limits  PERITONEUM: no ascites or free air, no fluid collection.  RETROPERITONEUM: no enlarged retroperitoneal or pelvic nodes. No   retroperitoneal or obvious soft tissue hemorrhage.    VESSELS: IVC filter.  ABDOMINAL WALL: within normal limits.  MUSCULOSKELETAL: Right hip arthroplasty. T12 vertebral compression   deformity.    IMPRESSION:     No retroperitoneal hemorrhage. Other findings as above necrosis stable   .      < end of copied text >        < from: US Testicles (17 @ 16:01) >    EXAM:  US SCROTUM AND CONTENTS                            PROCEDURE DATE:  2017          INTERPRETATION:      Ultrasound of the testes and scrotum         CLINICAL INFORMATION:      Scrotal ecchymosis    TECHNIQUE:   Transcutaneous sonographywas performed.  Doppler color and   spectral techniques were employed as part of this exam to evaluate   vascular structures of each hemiscrotum with regard to patency and flow   characteristics.    FINDINGS:  No comparison studies are available for review.      The right testis measures 3.8 x 1.8 x 2.0 cm.  There is intact Doppler   flow within testicular parenchyma.  No focal lesion is found.  Testicular   contours remain smooth.  The right epididymal head measures 1.0 cm and   appears unremarkable.  There is a physiologic amount of fluid in the   right hemiscrotum.         The left testis measures 3.0 x 1.6 x 2.6 cm.  There is intact Doppler   flow within testicular parenchyma.  No focal lesion is found. A 7 mm   scrotal calcification is seen, a benign finding Testicular contours   remain smooth.  The left epididymal head measures 1.1 cm and appears   unremarkable.  There is a physiologic amount of fluid in the left   hemiscrotum.      Moderate scrotal edema is noted.      IMPRESSION: Moderate scrotal edema.        1.   Right hemiscrotum:  Unremarkable.        2.   Left hemiscrotum:  Unremarkable.         < end of copied text >        Advanced directive addressed: full resuscitation

## 2017-12-28 NOTE — CONSULT NOTE ADULT - ASSESSMENT
Pt is a 82y old Male with hx of dementia with behavioral disturbance, depression, HTN admitted from [facility] with PRABHU, hydronephrosis noted on renal sono.  Per chart pt is uncooperative at times at baseline and came to ED with Loredo.  Hosp course notable for PRABHU, hematuria, s/p replacement which pt pulled out, hypernatremia, delirium, agitation.  Pt course complicated by anemia, fever, worsening PRABHU.  Palliative medicine consulted for assistance with goals of care.    1)Delirium, Agitation  -Related to underlying dementia with behavioral disturbance complicated by environmental change, uremia, infection  -Per chart family reports sedative medications exacerbate rather than alleviate behavioral disturbance, combativeness  -Pt has responded to Haldol 0.5mg earlier in hospital course  -Staff report pt relatively calm unless pt care or procedure initiated  -Currently on enhanced supervision for safety  -Fall precautions  -avoid anticholinergic meds as reasonable as can exacerbate delirium    2)PRABHU  -Per chart review began at [facility] roughly one month ago  - and renal input reviewed  -Imaging reviewed  -Obstructive uropathy and bladder wall thickening  -Pt s/p Loredo but pulled out and with difficulty tolerating it due to behavioral disturbance  -On IVFs  -Gu recalled for loredo placement  -Per renal pt poor candidate for HD due to behavioral disturbance    3)BPH  -Gu, renal, imaging reviewed  -Awaiting  followup for possible loredo replacement  -currently taking Flomax  -Worsening PRABHU due to obstructive uropathy    4)Fever  -New fever over last day or so  -ID consult pending  -WBC and lactate WNL    5)Anemia  -Gradually worsening since admission  -Maybe be partially dilutional due to IVFs but also with hematuria  -?transfusion PRBCS- need to clarfiy goals with family concern pt may not tolerate due to behavior    6)Dementia  -Chart indicates hx of behavioral disturbance  -Appears to be FAST 7B upon my assessment today- unclear what baseline PTA is  -Fall and aspiration precaution  -Currently on enhanced supervision due to delirium  -avoid anticholinergic meds as reasonable    7)Advance Directives, Goals of Care  -Pt does not have capacity to make medical decisions due to dementia  -Per staff pts daughter Amanda Basurto is pts health care agent  -Pt has no limits set on aggressive interventions at this time thus remain full code  -Team to contact HCP to schedule family meeting to discuss goals of care    D/w Dr. Gaston

## 2017-12-28 NOTE — PROGRESS NOTE ADULT - SUBJECTIVE AND OBJECTIVE BOX
Patient is a 82y Male who had no new events. Loredo in at 1030. Per RN report 375 out. My exam at 1pm, 125 in loredo so assumption 500 total since about 1030. Asleep.     REVIEW OF SYSTEMS:    UTO, dementia    MEDICATIONS  (STANDING):  dextrose 5%. 1000 milliLiter(s) (100 mL/Hr) IV Continuous <Continuous>  finasteride 5 milliGRAM(s) Oral daily  piperacillin/tazobactam IVPB. 3.375 Gram(s) IV Intermittent every 12 hours  tamsulosin 0.8 milliGRAM(s) Oral at bedtime    MEDICATIONS  (PRN):  acetaminophen   Tablet. 650 milliGRAM(s) Oral every 6 hours PRN Moderate Pain (4 - 6) and fever >101F  acetaminophen  Suppository 650 milliGRAM(s) Rectal every 6 hours PRN For Temp greater than 38 C (100.4 F)        T(C): , Max: 39.2 (17 @ 17:11)  T(F): , Max: 102.6 (17 @ 17:11)  HR: 98 (17 @ 11:52)  BP: 117/63 (17 @ 11:52)  BP(mean): --  RR: 18 (17 @ 11:52)  SpO2: 100% (17 @ 11:52)  Wt(kg): --     @ 07:01  -   @ 07:00  --------------------------------------------------------  IN: 1100 mL / OUT: 275 mL / NET: 825 mL     @ 07:01  -   @ 13:24  --------------------------------------------------------  IN: 120 mL / OUT: 0 mL / NET: 120 mL      PHYSICAL EXAM:    Constitutional: NAD  HEENT: WICHO OCHOA,  MMM  Neck: No LAD, No JVD  Respiratory: CTAB  Cardiovascular: S1 and S2, RRR  Gastrointestinal: BS+, soft, NT/ND  Extremities: No peripheral edema  Neurological: Asleep  :  Loredo  Skin: No rashes  Access: Not applicable        LABS:                        6.7    8.5   )-----------( 77       ( 28 Dec 2017 07:34 )             21.3     28 Dec 2017 07:34    145    |  114    |  73     ----------------------------<  140    4.3     |  20     |  5.73   27 Dec 2017 06:48    148    |  117    |  64     ----------------------------<  185    4.3     |  24     |  5.06   25 Dec 2017 18:15    155    |  124    |  59     ----------------------------<  107    4.4     |  22     |  3.79   25 Dec 2017 08:30    158    |  126    |  65     ----------------------------<  113    4.7     |  25     |  3.93   25 Dec 2017 06:00    158    |  125    |  67     ----------------------------<  121    4.7     |  25     |  3.95     Ca    8.1        28 Dec 2017 07:34  Ca    8.4        27 Dec 2017 06:48  Ca    8.3        25 Dec 2017 18:15  Ca    8.4        25 Dec 2017 08:30  Ca    8.3        25 Dec 2017 06:00  Phos  2.4       27 Dec 2017 06:48    TPro  6.8    /  Alb  2.9    /  TBili  0.5    /  DBili  x      /  AST  13     /  ALT  18     /  AlkPhos  97     24 Dec 2017 22:29          Urine Studies:  Urinalysis Basic - ( 27 Dec 2017 02:40 )    Color: Red / Appearance: Slightly Turbid / S.010 / pH: x  Gluc: x / Ketone: Negative  / Bili: Negative / Urobili: Negative mg/dL   Blood: x / Protein: 100 mg/dL / Nitrite: Negative   Leuk Esterase: Moderate / RBC: >50 /HPF / WBC 6-10   Sq Epi: x / Non Sq Epi: Occasional / Bacteria: Many            RADIOLOGY & ADDITIONAL STUDIES:

## 2017-12-28 NOTE — PROGRESS NOTE ADULT - PROBLEM SELECTOR PLAN 3
Likely obstructive cause given pt's history of BPH. Getting worse (Cr today 5.73 <-5.06 <- 3.79)    Urology consulted, placed coude that patient then removed (combative/dementia). Re-consulted, recommending palliative care consult due to likelihood of patient removing any catheter that is placed.  Monitor H&H given bleeding from previous catheter placement  Continue IV hydration - D5 @ 100  Continue tamsulosin  Trend BMPs  f/u nephrology consult - recommending fix obstructive uropathy from BPH and follow up urology

## 2017-12-28 NOTE — PROGRESS NOTE ADULT - ASSESSMENT
82 with a history of CKD, CVA, dementia, HTN here with hypernatremia and PRABHU, renal evaluation. PRABHU multifactorial from dehydration. Obstructive uropathy playing a role as well.    PRABHU  -Renal function rising likely from obstruction, expect improvement as it did prior with loredo in place  -Loredo  -URology  -Palliative for GOC  -Patient would not be a good candidate for HD, he would likely pull out any temp or tunelled catheter leading to greater harm. Son had agreed with this  -Doubt renals would progress as obstruction and volume status gets corrected    Hypernatremia  -Hypotonic IVF   -Oral intake as cleared    Anemia  -Management per medical team      d/c with residents, attending and medical team  d/c with staff 82 with a history of CKD, CVA, dementia, HTN here with hypernatremia and PRABHU, renal evaluation. PRABHU multifactorial from dehydration. Obstructive uropathy playing a role as well.    PRABHU  -Renal function rising likely from obstruction, expect improvement as it did prior with lroedo in place  -Loredo  -URology  -Palliative for GOC  -Patient would not be a good candidate for HD, he would likely pull out any temp or tunelled catheter leading to greater harm. Son had agreed with this  -Doubt renals would progress as obstruction and volume status gets corrected  -renal US tomorrow    Hypernatremia  -Hypotonic IVF   -Oral intake as cleared    Anemia  -Management per medical team      d/c with residents, attending and medical team  d/c with staff

## 2017-12-28 NOTE — PROGRESS NOTE ADULT - SUBJECTIVE AND OBJECTIVE BOX
82M w/ PMH of HTN, dementia, MDD, BPH was BIBA from [facility] for decreasing kidney function (BUN/Cr = 74/4.10). Pt's baseline mental status is disoriented and combative. As per [facility] staff/documentation - previous BUN/Cr was 46/1.82. Renal sonogram 17 showed questionable hydronephrosis. Could not provide the reason behind the sonogram work up.   BUN/cr = 60/3.67.  BUN/cr = 73/3.87; at which point he was started on IVF hydration. On  BUN/Cr worse at 81/4.29, then  BUN/Cr 74/4.10 and had Loredo inserted that day 17.   Pt is on medication for BPH only because his family is refusing medication for tx of his other diagnoses.    Pt is full code per accompanying MOLST form.    17: Pt seen and examined at bedside. Pt is AOx1 and is more awake today, but still cannot provide history or ROS.    ROS  Unable to assess    Vital Signs Last 24 Hrs  T(C): 38.1 (17 @ 11:52)  T(F): 100.5 (17 @ 11:52), Max: 102.6 (17 @ 17:11)  HR: 98 (17 @ 11:52) (90 - 105)  BP: 117/63 (17 @ 11:52)  BP(mean): --  RR: 18 (17 @ 11:52) (16 - 20)  SpO2: 100% (17 @ 11:52) (100% - 100%)  Wt(kg): --     @ 07:01  -   @ 07:00  --------------------------------------------------------  IN: 1100 mL / OUT: 275 mL / NET: 825 mL     @ 07:01  -   @ 13:10  --------------------------------------------------------  IN: 120 mL / OUT: 0 mL / NET: 120 mL    PHYSICAL EXAM:    GENERAL: AOx1, NAD, well-groomed, well-developed. Combative  HEAD:  NC/AT  EYES: EOMI, PERRLA, no scleral icterus  HEENT: Moist mucous membranes  NECK: Supple, No JVD  CNS: Motor Strength 5/5 B/L upper and lower extremities; DTRs 2+ intact   LUNG: Clear to auscultation bilaterally; No rales, rhonchi, wheezing, or rubs  HEART: RRR; No murmurs, rubs, or gallops  ABDOMEN: ST/ND/NT. + umbilical hernia, no erythema/edema. + tender on palpation.  GENITOURINARY- loredo catheter replaced by FM team  EXTREMITIES:  2+ Peripheral Pulses, No clubbing, cyanosis, or edema  MUSCULOSKELETAL- Joints normal ROM, no Muscle or joint tenderness    Lab Results:                                            6.7                   Neurophils% (auto):   x      ( @ 07:34):    8.5  )-----------(77           Lymphocytes% (auto):  x                                             21.3                   Eosinphils% (auto):   x        Manual%: Neutrophils x    ; Lymphocytes x    ; Eosinophils x    ; Bands%: x    ; Blasts x         Differential:	[] Automated		[] Manual        145  |  114<H>  |  73<H>  ----------------------------<  140<H>  4.3   |  20<L>  |  5.73<H>    Ca    8.1<L>      28 Dec 2017 07:34  Phos  2.4         Urinalysis Basic - ( 27 Dec 2017 02:40 )    Color: Red / Appearance: Slightly Turbid / S.010 / pH: x  Gluc: x / Ketone: Negative  / Bili: Negative / Urobili: Negative mg/dL   Blood: x / Protein: 100 mg/dL / Nitrite: Negative   Leuk Esterase: Moderate / RBC: >50 /HPF / WBC 6-10   Sq Epi: x / Non Sq Epi: Occasional / Bacteria: Many    IMAGING    < from: CT Abdomen and Pelvis No Cont (12.25.17 @ 07:07) >  EXAM:  CT ABDOMEN AND PELVIS                            PROCEDURE DATE:  2017  IMPRESSION:     Moderate to severe bilateral hydroureteronephrosis. Markedly irregular   thickening of the bladder wall with perivesicular inflammatory changes.   Enlarged prostate. Correlate with cystoscopy.    < end of copied text >

## 2017-12-28 NOTE — CONSULT NOTE ADULT - ASSESSMENT
83yo M w/PMHx of HTN, Dementia, Major Depressive  Disorder, BPH, admitted on 12/24 for evaluation of altered mental status, agitation. The patient has been having fever since admission, had loredo catheter placed on admission, however, this was traumatically removed with subsequent swelling of scrotum. Patient intermittently combative, unable to obtain history and history per medical record. Labwork within the last 24 hours shows significant drop in hgb/hct of unclear etiology.  1. Patient with persistent fever, even while on zosyn; cystitis versus microaspiration?  - now found to have significant drop in hemoglobin, unclear if patient bleeding, given ct imaging unrevealing  - bleed could cause fever  - ?drug fever to zosyn; will change to ceftriaxone, possibly microaspiration, all cultures are unrevealing as well  - iv hydration and supportive care   - serial cbc and monitor temperature   - currently undergoing transfusion  - if has diarrhea should send stool for cdiff  - if drops hemoglobin again should work up for DIC or hemolysis syndrome  2. other issues; hypertension, dementia, bph  - per medicine

## 2017-12-28 NOTE — PROGRESS NOTE ADULT - PROBLEM SELECTOR PLAN 1
- Likely obstructive cause given pt's history of BPH  - Urology consulted, placed coude catheter w/ return of clear urine-patient removed the cath 12/26  - continue with IV fluids  - continue tamsulosin  - nephrology following - Dr. Arjun Bishop  - care discussed with Dr. Vegas this am, will come and evaluate the patient

## 2017-12-28 NOTE — PROGRESS NOTE ADULT - PROBLEM SELECTOR PLAN 2
Unchanged, started overnight 12/26 - 12/27. Unclear source.    Started zosyn  Continue tylenol PRN fevers  f/u CT abd/pel non-con today  LA was normal, f/u Ucx  Bcx have been negative

## 2017-12-28 NOTE — PROGRESS NOTE ADULT - PROBLEM SELECTOR PLAN 6
Will hold chemical DVT prophylaxis given hematuria - will hold chemical DVT prophylaxis given hematuria

## 2017-12-28 NOTE — PROGRESS NOTE ADULT - SUBJECTIVE AND OBJECTIVE BOX
82M w/ PMH of HTN, dementia, MDD, BPH was BIBA from [place] for decreasing kidney function (BUN/Cr = 74/4.10). Pt's baseline mental status is disoriented and combative. As per [facility] staff/documentation - previous BUN/Cr was 46/1.82. Renal sonogram 17 showed questionable hydronephrosis. Could not provide the reason behind the sonogram work up.   BUN/cr = 60/3.67.  BUN/cr = 73/3.87; at which point he was started on IVF hydration. On  BUN/Cr worse at 81/4.29, then  BUN/Cr 74/4.10 and had Loredo inserted that day 17.   Pt is on medication for BPH only because his family is refusing medication for tx of his other diagnoses.    Pt is full code per accompanying MOLST form.    : Pt seen and examined at bedside. Was combative ripped the cath  received 0.5 mg of haloperidol he was comfortable and resting. D/W with the patient son his baseline is combative and aggressive he is not on any medication he becomes more aggressive if started on the medication. Overnight fever 100.3,no fever urine cultures NGTD.    : seen and eval. overnight fevers. worsening renal function. Poor historian and patient is not waking up this am. Scrotal region swollen with red/blue discoloration. Care discussed with Dr. Vegas and asked if the loredo can be placed in.     : seen and eval. hemodynamically stable. Patient is sick, deconditioned, elderly male. non - responsive. patient is continuing to spike fevers. Labs grossly abnormal. Care discussed with Dr. Arjun Bishop who identifies elevated Cr as obstructive cause. Patient care thoroughly discussed with patient's family - family is aware. I will attempt to place a loredo in to monitor I/O as well as relieve the obstruction.     ROS  Unable to assess      PHYSICAL EXAM:  T(C): 38.4 (28 Dec 2017 06:40), Max: 39.2 (27 Dec 2017 17:11)  T(F): 101.2 (28 Dec 2017 06:40), Max: 102.6 (27 Dec 2017 17:11)  HR: 93 (28 Dec 2017 06:40) (73 - 105)  BP: 136/63 (28 Dec 2017 06:40) (98/56 - 147/74)  RR: 19 (28 Dec 2017 06:40) (16 - 20)  SpO2: 100% (28 Dec 2017 06:40) (97% - 100%)  GENERAL: NAD, well-groomed, well-developed. Resting after the haldol  EYES: EOMI, PERRLA, no scleral icterus  HEENT: Moist mucous membranes  NECK: Supple, No JVD  CNS: Motor Strength 5/5 B/L upper and lower extremities; DTRs 2+ intact   LUNG: Clear to auscultation bilaterally; No rales, rhonchi, wheezing, or rubs  HEART: RRR; No murmurs, rubs, or gallops  ABDOMEN: ST/ND/NT  GENITOURINARY- after removal of Loredo + bright red blood per urethra  EXTREMITIES:  2+ Peripheral Pulses, No clubbing, cyanosis, or edema  MUSCULOSKELETAL- Joints normal ROM, no Muscle or joint tenderness    Labs:   CBC Full  -  ( 28 Dec 2017 07:34 )  WBC Count : 8.5 K/uL  Hemoglobin : 6.7 g/dL  Hematocrit : 21.3 %  Platelet Count - Automated : 77 K/uL    Urinalysis Basic - ( 27 Dec 2017 02:40 )    Color: Red / Appearance: Slightly Turbid / S.010 / pH: x  Gluc: x / Ketone: Negative  / Bili: Negative / Urobili: Negative mg/dL   Blood: x / Protein: 100 mg/dL / Nitrite: Negative   Leuk Esterase: Moderate / RBC: >50 /HPF / WBC 6-10   Sq Epi: x / Non Sq Epi: Occasional / Bacteria: Many    145  |  114<H>  |  73<H>  ----------------------------<  140<H>  4.3   |  20<L>  |  5.73<H>    Ca    8.1<L>      28 Dec 2017 07:34  Phos  2.4     12-27    CT Abdomen and Pelvis No Cont   EXAM:  CT ABDOMEN AND PELVIS                        PROCEDURE DATE:  2017  IMPRESSION:   Moderate to severe bilateral hydroureteronephrosis. Markedly irregular   thickening of the bladder wall with perivesicular inflammatory changes.   Enlarged prostate. Correlate with cystoscopy. 82M w/ PMH of HTN, dementia, MDD, BPH was BIBA from [place] for decreasing kidney function (BUN/Cr = 74/4.10). Pt's baseline mental status is disoriented and combative. As per [facility] staff/documentation - previous BUN/Cr was 46/1.82. Renal sonogram 17 showed questionable hydronephrosis. Could not provide the reason behind the sonogram work up.   BUN/cr = 60/3.67.  BUN/cr = 73/3.87; at which point he was started on IVF hydration. On  BUN/Cr worse at 81/4.29, then  BUN/Cr 74/4.10 and had Loredo inserted that day 17.   Pt is on medication for BPH only because his family is refusing medication for tx of his other diagnoses.    Pt is full code per accompanying MOLST form.    : Pt seen and examined at bedside. Was combative ripped the cath  received 0.5 mg of haloperidol he was comfortable and resting. D/W with the patient son his baseline is combative and aggressive he is not on any medication he becomes more aggressive if started on the medication. Overnight fever 100.3,no fever urine cultures NGTD.    : seen and eval. overnight fevers. worsening renal function. Poor historian and patient is not waking up this am. Scrotal region swollen with red/blue discoloration. Care discussed with Dr. Vegas and asked if the loredo can be placed in.     : seen and eval. hemodynamically stable. Patient is sick, deconditioned, elderly male. non - responsive. patient is continuing to spike fevers. Labs grossly abnormal. Care discussed with Dr. Arjun Bishop who identifies elevated Cr as obstructive cause. Patient care thoroughly discussed with patient's family - family is aware. I will attempt to place a loredo in to monitor I/O as well as relieve the obstruction. patient will also get 2 units of PRBCs. Care discussed with Pallative care team and Dr. Arjun Bishop.     ROS  Unable to assess      PHYSICAL EXAM:  ICU Vital Signs Last 24 Hrs  T(C): 38.1 (28 Dec 2017 11:52), Max: 39.2 (27 Dec 2017 17:11)  T(F): 100.5 (28 Dec 2017 11:52), Max: 102.6 (27 Dec 2017 17:11)  HR: 98 (28 Dec 2017 11:52) (90 - 105)  BP: 117/63 (28 Dec 2017 11:52) (107/49 - 147/74)  RR: 18 (28 Dec 2017 11:52) (16 - 20)  SpO2: 100% (28 Dec 2017 11:52) (100% - 100%)  GENERAL: NAD, well-groomed, well-developed. Resting after the haldol  EYES: EOMI, PERRLA, no scleral icterus  HEENT: Moist mucous membranes  NECK: Supple, No JVD  CNS: Motor Strength 5/5 B/L upper and lower extremities; DTRs 2+ intact   LUNG: Clear to auscultation bilaterally; No rales, rhonchi, wheezing, or rubs  HEART: RRR; No murmurs, rubs, or gallops  ABDOMEN: ST/ND/NT  GENITOURINARY- after removal of Loredo + bright red blood per urethra  EXTREMITIES:  2+ Peripheral Pulses, No clubbing, cyanosis, or edema  MUSCULOSKELETAL- Joints normal ROM, no Muscle or joint tenderness    Labs:     CBC Full  -  ( 28 Dec 2017 07:34 )  WBC Count : 8.5 K/uL  Hemoglobin : 6.7 g/dL  Hematocrit : 21.3 %  Platelet Count - Automated : 77 K/uL    Urinalysis Basic - ( 27 Dec 2017 02:40 )  Color: Red / Appearance: Slightly Turbid / S.010 / pH: x  Gluc: x / Ketone: Negative  / Bili: Negative / Urobili: Negative mg/dL   Blood: x / Protein: 100 mg/dL / Nitrite: Negative   Leuk Esterase: Moderate / RBC: >50 /HPF / WBC 6-10   Sq Epi: x / Non Sq Epi: Occasional / Bacteria: Many    145  |  114<H>  |  73<H>  ----------------------------<  140<H>  4.3   |  20<L>  |  5.73<H>    Ca    8.1<L>      28 Dec 2017 07:34  Phos  2.4     12-27          CT Abdomen and Pelvis No Cont   EXAM:  CT ABDOMEN AND PELVIS                        PROCEDURE DATE:  2017  IMPRESSION:   Moderate to severe bilateral hydroureteronephrosis. Markedly irregular   thickening of the bladder wall with perivesicular inflammatory changes.   Enlarged prostate. Correlate with cystoscopy. 82M w/ PMH of HTN, dementia, MDD, BPH was BIBA from [place] for decreasing kidney function (BUN/Cr = 74/4.10). Pt's baseline mental status is disoriented and combative. As per [facility] staff/documentation - previous BUN/Cr was 46/1.82. Renal sonogram 17 showed questionable hydronephrosis. Could not provide the reason behind the sonogram work up.   BUN/cr = 60/3.67.  BUN/cr = 73/3.87; at which point he was started on IVF hydration. On  BUN/Cr worse at 81/4.29, then  BUN/Cr 74/4.10 and had Loredo inserted that day 17.   Pt is on medication for BPH only because his family is refusing medication for tx of his other diagnoses.    Pt is full code per accompanying MOLST form.    : Pt seen and examined at bedside. Was combative ripped the cath  received 0.5 mg of haloperidol he was comfortable and resting. D/W with the patient son his baseline is combative and aggressive he is not on any medication he becomes more aggressive if started on the medication. Overnight fever 100.3,no fever urine cultures NGTD.    : seen and eval. overnight fevers. worsening renal function. Poor historian and patient is not waking up this am. Scrotal region swollen with red/blue discoloration. Care discussed with Dr. Vegas and asked if the loredo can be placed in.     : seen and eval. hemodynamically stable. Patient is sick, deconditioned, elderly male. non - responsive. patient is continuing to spike fevers. Labs grossly abnormal. Care discussed with Dr. Arjun Bishop who identifies elevated Cr as obstructive cause. Patient care thoroughly discussed with patient's family - family is aware of patient's overall poor clinical state. I will attempt to place a loredo in to monitor I/O as well as relieve the obstruction. patient will also get 2 units of PRBCs. Care discussed with Pallative care team and Dr. Arjun Bishop.     ROS  Unable to assess      PHYSICAL EXAM:  ICU Vital Signs Last 24 Hrs  T(C): 38.1 (28 Dec 2017 11:52), Max: 39.2 (27 Dec 2017 17:11)  T(F): 100.5 (28 Dec 2017 11:52), Max: 102.6 (27 Dec 2017 17:11)  HR: 98 (28 Dec 2017 11:52) (90 - 105)  BP: 117/63 (28 Dec 2017 11:52) (107/49 - 147/74)  RR: 18 (28 Dec 2017 11:52) (16 - 20)  SpO2: 100% (28 Dec 2017 11:52) (100% - 100%)  GENERAL: NAD, well-groomed, well-developed. Resting after the haldol  EYES: EOMI, PERRLA, no scleral icterus  HEENT: Moist mucous membranes  NECK: Supple, No JVD  CNS: Motor Strength 5/5 B/L upper and lower extremities; DTRs 2+ intact   LUNG: Clear to auscultation bilaterally; No rales, rhonchi, wheezing, or rubs  HEART: RRR; No murmurs, rubs, or gallops  ABDOMEN: ST/ND/NT  GENITOURINARY- after removal of Loredo + bright red blood per urethra  EXTREMITIES:  2+ Peripheral Pulses, No clubbing, cyanosis, or edema  MUSCULOSKELETAL- Joints normal ROM, no Muscle or joint tenderness    Labs:     CBC Full  -  ( 28 Dec 2017 07:34 )  WBC Count : 8.5 K/uL  Hemoglobin : 6.7 g/dL  Hematocrit : 21.3 %  Platelet Count - Automated : 77 K/uL    Urinalysis Basic - ( 27 Dec 2017 02:40 )  Color: Red / Appearance: Slightly Turbid / S.010 / pH: x  Gluc: x / Ketone: Negative  / Bili: Negative / Urobili: Negative mg/dL   Blood: x / Protein: 100 mg/dL / Nitrite: Negative   Leuk Esterase: Moderate / RBC: >50 /HPF / WBC 6-10   Sq Epi: x / Non Sq Epi: Occasional / Bacteria: Many    145  |  114<H>  |  73<H>  ----------------------------<  140<H>  4.3   |  20<L>  |  5.73<H>    Ca    8.1<L>      28 Dec 2017 07:34  Phos  2.4     12-27          CT Abdomen and Pelvis No Cont   EXAM:  CT ABDOMEN AND PELVIS                        PROCEDURE DATE:  2017  IMPRESSION:   Moderate to severe bilateral hydroureteronephrosis. Markedly irregular   thickening of the bladder wall with perivesicular inflammatory changes.   Enlarged prostate. Correlate with cystoscopy.

## 2017-12-28 NOTE — CONSULT NOTE ADULT - SUBJECTIVE AND OBJECTIVE BOX
HPI: Pt is a 82y old Male with hx of dementia with behavioral disturbance, depression, HTN admitted from [facility] with PRABHU, hydronephrosis noted on renal sono.  Per chart pt is uncooperative at times at baseline and came to ED with Loredo.  Hosp course notable for PRABHU, hematuria, s/p replacement which pt pulled out, hypernatremia, delirium, agitation.  Pt course complicated by anemia, fever, worsening PRABHU.  Palliative medicine consulted for assistance with goals of care.    Pt seen and examined by me with enhanced supervision aide at bedside, but no family present at bedside for evaluation of goals of care.  Pt is sleeping but wakes to mild sternal rub.  He is calm at time of my visit but nonverbal (staff report this is baseline).  He does not shake head yes or no ro follow commands at time of my visit.  Pt unable to provide any hx, ROS.    Per chart review he removed his loredo on 12/26- urology has been recalled to attempt replacement of loredo.  He has received 3 doses of Tylenol suppository in last 24 hr for fevers.    He has not received any Haldol in last 24 hrs      PAIN: ( )Yes   (X )No  assessed visually as pt unable to provide    DYSPNEA: ( ) Yes  (X ) No  assessed visually as pt unable to provide    PAST MEDICAL & SURGICAL HISTORY:  Nasal bone fractures  Posture abnormality  Gait abnormality  Dementia with behavioral disturbance, unspecified dementia type  Macular degeneration  Depression  Hypertension  Dementia  No significant past surgical history      SOCIAL HX: Resident of [facility] per chart review- pt unable to provide due to dementia    Hx opiate tolerance ( )YES  (X )NO Per chart review  pt unable to provide due to dementia    Baseline ADLs  (Prior to Admission)  ( ) Independent   (x )Dependent    FAMILY HISTORY:  pt unable to provide due to dementia  pt has living son and daughter per chart, staff      Review of Systems:    Anxiety-  Depression-  Physical Discomfort-  Dyspnea-  Constipation-  Diarrhea-  Nausea-  Vomiting-  Anorexia-  Weight Loss-   Cough-  Secretions-  Fatigue-  Weakness-  Delirium- severe at times    Unable to obtain due to: dementia, some per staff, chart review      PHYSICAL EXAM:    Vital Signs Last 24 Hrs  T(C): 38.4 (28 Dec 2017 06:40), Max: 39.2 (27 Dec 2017 17:11)  T(F): 101.2 (28 Dec 2017 06:40), Max: 102.6 (27 Dec 2017 17:11)  HR: 93 (28 Dec 2017 06:40) (73 - 105)  BP: 136/63 (28 Dec 2017 06:40) (98/56 - 147/74)  RR: 19 (28 Dec 2017 06:40) (16 - 20)  SpO2: 100% (28 Dec 2017 06:40) (97% - 100%)      PPSV2:  30 %  FAST: 7B    General: sleeping, wakes to sternal rub- nonverbal, calm  Mental Status: awake- unable to assess orientation due to mental status  HEENT: EOMI, dry oral mucosa  Lungs: CTA b/l  Cardiac: S1S2+  GI: soft, + bowels sounds  : incontinent- currently no loredo in place, gown with evidence of pink urine in groin area  Ext: moves all 4 exts spontaneously- not to command  Neuro: awake- unable to assess further at pt nonvebral and not following commands at time of my visit      LABS:                        6.7    8.5   )-----------( 77       ( 28 Dec 2017 07:34 )             21.3     12-28    145  |  114<H>  |  73<H>  ----------------------------<  140<H>  4.3   |  20<L>  |  5.73<H>    Ca    8.1<L>      28 Dec 2017 07:34  Phos  2.4     12-27        Albumin: Albumin, Serum: 2.9 g/dL (12-24 @ 22:29)      Allergies  No Known Allergies      MEDICATIONS  (STANDING):  dextrose 5%. 1000 milliLiter(s) (100 mL/Hr) IV Continuous <Continuous>  finasteride 5 milliGRAM(s) Oral daily  piperacillin/tazobactam IVPB. 3.375 Gram(s) IV Intermittent every 12 hours  tamsulosin 0.8 milliGRAM(s) Oral at bedtime    MEDICATIONS  (PRN):  acetaminophen   Tablet. 650 milliGRAM(s) Oral every 6 hours PRN Moderate Pain (4 - 6) and fever >101F      RADIOLOGY/ADDITIONAL STUDIES:    EXAM:  US SCROTUM AND CONTENTS                        PROCEDURE DATE:  12/27/2017    FINDINGS:  No comparison studies are available for review.      The right testis measures 3.8 x 1.8 x 2.0 cm.  There is intact Doppler   flow within testicular parenchyma.  No focal lesion is found.  Testicular   contours remain smooth.  The right epididymal head measures 1.0 cm and   appears unremarkable.  There is a physiologic amount of fluid in the   right hemiscrotum.         The left testis measures 3.0 x 1.6 x 2.6 cm.  There is intact Doppler   flow within testicular parenchyma.  No focal lesion is found. A 7 mm   scrotal calcification is seen, a benign finding Testicular contours   remain smooth.  The left epididymal head measures 1.1 cm and appears   unremarkable.  There is a physiologic amount of fluid in the left   hemiscrotum.      Moderate scrotal edema is noted.      EXAM:  CHEST SINGLE VIEW FRONTAL                        PROCEDURE DATE:  12/27/2017    Comparison: 2 days prior  History: Fever    The lungs are clear of infiltrates and effusions. There is a poor   inspiratory result. The cardiac and mediastinal contours appear   unremarkable.     EXAM:  CT ABDOMEN AND PELVIS                        PROCEDURE DATE:  12/25/2017    COMPARISON:  None.    FINDINGS:    Liver: within normal limits  Gallbladder: Within normal limits  Bile ducts: No intrahepatic or extrahepatic biliary dilatation  Pancreas: within normal limits    Spleen: within normal limits  Adrenal: within normal limits  Kidneys, Ureters and Bladder: Moderate to severe bilateral   hydroureteronephrosis with marked distention of the renal collecting   system and ureters to the level of the urinary bladder. Marked irregular   bladder wall thickening and heterogeneity with decrease in bladder   volume. Irregular inflammatory changes in the perivesicular fat. Enlarged   prostate. No intrarenal or bladder calculi.     Pelvis: No pelvic adenopathy or pelvic free fluid.  Fat-containing   ventral hernias.  Bowel: No bowel obstruction.  Normal appendix visualized.  Colonic   diverticulosis without diverticulitis.    Peritoneum: No intra-abdominal free air, ascites or organized fluid   collections.    Retroperitoneum: within normal limits  Vessels: Atherosclerotic changes.    Abdominal wall: Small fat-containing umbilical hernia.    Lower chest and lung Bases: The visualized lung bases clear except for   subsegmental dependent atelectasis. Heart normal in size. Arterial   calcification.    Bones: Degenerative changes. Chronic compression deformity T12. Mild   thoracolumbar kyphosis. Status post right hip fracture post surgery    IMPRESSION:     Moderate to severe bilateral hydroureteronephrosis. Markedly irregular   thickening of the bladder wall with perivesicular inflammatory changes.   Enlarged prostate. Correlate with cystoscopy.    EXAM:  CHEST SINGLE VIEW FRONTAL                        PROCEDURE DATE:  12/25/2017    Findings:    The heart is enlarge. No focal consolidation. The apices and   hemidiaphragms are unremarkable. Degenerative changes of the visualized   osseous structures.

## 2017-12-28 NOTE — CHART NOTE - NSCHARTNOTEFT_GEN_A_CORE
Received call from RN regarding prelim B cx drawn 27th , gram stain shows GNR . Pt already on Abx, Zosyn and now Ceftriaxone which cover GNR. Wait for further culture and sensitivity to adjust therapy if needed

## 2017-12-28 NOTE — PROGRESS NOTE ADULT - ASSESSMENT
82M w/ PMH as above admitted with PRABHU due to likely obstructive uropathy. Kidney function continues to worsen, placed Chance @ bedside today without issues. However, acute drop in H&H today (6.7/21.3 from 7.7/23.5 yesterday) from unknown bleeding source. There has been blood in  urine but unlikely source of acute drop.

## 2017-12-28 NOTE — PROGRESS NOTE ADULT - PROBLEM SELECTOR PLAN 4
- PRN low dose PO of haldol no ativan  - please try to reorient before giving the antibiotics. - PRN haldol

## 2017-12-29 DIAGNOSIS — R78.81 BACTEREMIA: ICD-10-CM

## 2017-12-29 LAB
ANION GAP SERPL CALC-SCNC: 7 MMOL/L — SIGNIFICANT CHANGE UP (ref 5–17)
BUN SERPL-MCNC: 69 MG/DL — HIGH (ref 7–23)
CALCIUM SERPL-MCNC: 8.1 MG/DL — LOW (ref 8.5–10.1)
CHLORIDE SERPL-SCNC: 114 MMOL/L — HIGH (ref 96–108)
CO2 SERPL-SCNC: 22 MMOL/L — SIGNIFICANT CHANGE UP (ref 22–31)
CREAT SERPL-MCNC: 5.04 MG/DL — HIGH (ref 0.5–1.3)
CULTURE RESULTS: SIGNIFICANT CHANGE UP
GLUCOSE SERPL-MCNC: 141 MG/DL — HIGH (ref 70–99)
HCT VFR BLD CALC: 28.8 % — LOW (ref 39–50)
HCT VFR BLD CALC: 29 % — LOW (ref 39–50)
HGB BLD-MCNC: 9.5 G/DL — LOW (ref 13–17)
HGB BLD-MCNC: 9.5 G/DL — LOW (ref 13–17)
MCHC RBC-ENTMCNC: 29.4 PG — SIGNIFICANT CHANGE UP (ref 27–34)
MCHC RBC-ENTMCNC: 29.5 PG — SIGNIFICANT CHANGE UP (ref 27–34)
MCHC RBC-ENTMCNC: 33 GM/DL — SIGNIFICANT CHANGE UP (ref 32–36)
MCHC RBC-ENTMCNC: 33 GM/DL — SIGNIFICANT CHANGE UP (ref 32–36)
MCV RBC AUTO: 89.3 FL — SIGNIFICANT CHANGE UP (ref 80–100)
MCV RBC AUTO: 89.3 FL — SIGNIFICANT CHANGE UP (ref 80–100)
PLATELET # BLD AUTO: 109 K/UL — LOW (ref 150–400)
PLATELET # BLD AUTO: 94 K/UL — LOW (ref 150–400)
POTASSIUM SERPL-MCNC: 4.1 MMOL/L — SIGNIFICANT CHANGE UP (ref 3.5–5.3)
POTASSIUM SERPL-SCNC: 4.1 MMOL/L — SIGNIFICANT CHANGE UP (ref 3.5–5.3)
RBC # BLD: 3.22 M/UL — LOW (ref 4.2–5.8)
RBC # BLD: 3.24 M/UL — LOW (ref 4.2–5.8)
RBC # FLD: 12.7 % — SIGNIFICANT CHANGE UP (ref 10.3–14.5)
RBC # FLD: 12.9 % — SIGNIFICANT CHANGE UP (ref 10.3–14.5)
SODIUM SERPL-SCNC: 143 MMOL/L — SIGNIFICANT CHANGE UP (ref 135–145)
SPECIMEN SOURCE: SIGNIFICANT CHANGE UP
WBC # BLD: 8.5 K/UL — SIGNIFICANT CHANGE UP (ref 3.8–10.5)
WBC # BLD: 8.9 K/UL — SIGNIFICANT CHANGE UP (ref 3.8–10.5)
WBC # FLD AUTO: 8.5 K/UL — SIGNIFICANT CHANGE UP (ref 3.8–10.5)
WBC # FLD AUTO: 8.9 K/UL — SIGNIFICANT CHANGE UP (ref 3.8–10.5)

## 2017-12-29 PROCEDURE — 76770 US EXAM ABDO BACK WALL COMP: CPT | Mod: 26

## 2017-12-29 RX ORDER — SODIUM CHLORIDE 9 MG/ML
1000 INJECTION, SOLUTION INTRAVENOUS
Qty: 0 | Refills: 0 | Status: DISCONTINUED | OUTPATIENT
Start: 2017-12-29 | End: 2017-12-31

## 2017-12-29 RX ORDER — POLYETHYLENE GLYCOL 3350 17 G/17G
17 POWDER, FOR SOLUTION ORAL
Qty: 0 | Refills: 0 | Status: DISCONTINUED | OUTPATIENT
Start: 2017-12-29 | End: 2018-01-03

## 2017-12-29 RX ORDER — GLYCERIN 1 %
1 DROPS OPHTHALMIC (EYE)
Qty: 0 | Refills: 0 | Status: DISCONTINUED | OUTPATIENT
Start: 2017-12-29 | End: 2018-01-03

## 2017-12-29 RX ADMIN — CEFTRIAXONE 1000 MILLIGRAM(S): 500 INJECTION, POWDER, FOR SOLUTION INTRAMUSCULAR; INTRAVENOUS at 17:19

## 2017-12-29 RX ADMIN — Medication 650 MILLIGRAM(S): at 14:32

## 2017-12-29 RX ADMIN — Medication 650 MILLIGRAM(S): at 12:34

## 2017-12-29 RX ADMIN — FINASTERIDE 5 MILLIGRAM(S): 5 TABLET, FILM COATED ORAL at 11:09

## 2017-12-29 RX ADMIN — POLYETHYLENE GLYCOL 3350 17 GRAM(S): 17 POWDER, FOR SOLUTION ORAL at 17:19

## 2017-12-29 RX ADMIN — TAMSULOSIN HYDROCHLORIDE 0.8 MILLIGRAM(S): 0.4 CAPSULE ORAL at 21:53

## 2017-12-29 RX ADMIN — SODIUM CHLORIDE 60 MILLILITER(S): 9 INJECTION, SOLUTION INTRAVENOUS at 14:49

## 2017-12-29 RX ADMIN — Medication 1 DROP(S): at 17:19

## 2017-12-29 NOTE — PROGRESS NOTE ADULT - SUBJECTIVE AND OBJECTIVE BOX
HPI: Pt is a 82y old Male with hx of dementia with behavioral disturbance, depression, HTN admitted from [facility] with PRABHU, hydronephrosis noted on renal sono.  Per chart pt is uncooperative at times at baseline and came to ED with Loredo.  Hosp course notable for PRABHU, hematuria, s/p replacement which pt pulled out, hypernatremia, delirium, agitation.  Pt course complicated by anemia, fever, worsening PRABHU.  Palliative medicine consulted for assistance with goals of care.    Pt seen and examined by me with enhanced supervision aide at bedside, but no family present at bedside for followup of goals of care.  Pt is sleeping but wakes to my voice..  He is calm at time of my visit and today is more alert.  He attempts to answer my questions but with severe word finding difficulty.  Was able to say no when asked about pain.  Unable to obtain further hx due to dementia    Per chart review loredo was replaced yest and pt has tolerated it though he has mittens in place.  He received 2 doses of Tylenol overnight as he remains febrile.  He did receive 2 units of PRBCs as well    He has not received any Haldol in last 24 hrs      PAIN: ( )Yes   (X )No    DYSPNEA: ( ) Yes  (X ) No  assessed visually as pt unable to provide    Review of Systems:    Anxiety-  Depression-  Physical Discomfort- denies  Dyspnea-  Constipation-  Diarrhea-  Nausea-  Vomiting-  Anorexia-  Weight Loss-   Cough-  Secretions-  Fatigue-  Weakness-  Delirium-     Unable to obtain due to: dementia, some per staff, chart review      PHYSICAL EXAM:    Vital Signs Last 24 Hrs  T(C): 37.7 (29 Dec 2017 11:56), Max: 39.1 (28 Dec 2017 22:55)  T(F): 99.9 (29 Dec 2017 11:56), Max: 102.3 (28 Dec 2017 22:55)  HR: 92 (29 Dec 2017 11:56) (72 - 92)  BP: 99/42 (29 Dec 2017 11:56) (84/50 - 130/60)  RR: 16 (29 Dec 2017 11:56) (16 - 20)  SpO2: 99% (29 Dec 2017 11:56) (96% - 100%)      PPSV2:  30 %  FAST: 7B    General: sleeping, wakes to my voicel, calm  Mental Status: awake- attempts to answer questions. does not follow commands  HEENT: EOMI, dry oral mucosa  Lungs: CTA b/l  Cardiac: S1S2+  GI: soft, + bowels sounds  : incontinent- currently loredo in place with urine output  Ext: moves all 4 exts spontaneously- not to command  Neuro: awake- unable to assess further due to dementia      LABS                          9.5    8.9   )-----------( 109      ( 29 Dec 2017 07:21 )             28.8     12-29    143  |  114<H>  |  69<H>  ----------------------------<  141<H>  4.1   |  22  |  5.04<H>    Ca    8.1<L>      29 Dec 2017 07:21        RADIOLOGY/ADDITIONAL STUDIES:    EXAM:  CT ABDOMEN AND PELVIS                        PROCEDURE DATE:  12/28/2017    FINDINGS:    LOWER CHEST: Trace effusions and atelectasis    ABDOMEN:  LIVER: within normal limits  BILE DUCTS: normal caliber  GALLBLADDER: no calcified gallstones. normal caliber wall  PANCREAS: within normal limits  SPLEEN: within normal limits  ADRENALS: within normal limits  KIDNEYS: Moderate bilateral hydronephrosis,grossly stable.    PELVIS:  REPRODUCTIVE ORGANS: no pelvic masses  BLADDER: Thickened, contracted around Loredo catheter. Perivesical Fat   stranding, appears grossly similar to prior.    BOWEL: within normal limits  PERITONEUM: no ascites or free air, no fluid collection.  RETROPERITONEUM: no enlarged retroperitoneal or pelvic nodes. No   retroperitoneal or obvious soft tissue hemorrhage.    VESSELS: IVC filter.  ABDOMINAL WALL: within normal limits.  MUSCULOSKELETAL: Right hip arthroplasty. T12 vertebral compression   deformity.    EXAM:  US KIDNEYS AND BLADDER                        PROCEDURE DATE:  12/29/2017    FINDINGS:   CT dated 12/28/2017 available for review.    The right kidney measures 8.0 cm in length. Its contours are smooth.  No   focal lesion is found.  No calculi are seen. Moderate hydronephrosis and   proximal hydroureter is present.     The left kidney measures 9.8 cm in length. Its contours are smooth.  No   focal lesion is found.  No calculi are seen. Moderate hydronephrosis is   present.    The abdominal aorta measures normal caliber anterior to posterior.  The   IVC and retroperitoneal structures appear intact. Loredo catheter is seen   within the bladder.

## 2017-12-29 NOTE — PROGRESS NOTE ADULT - ASSESSMENT
Pt is a 82y old Male with hx of dementia with behavioral disturbance, depression, HTN admitted from [facility] with PRABHU, hydronephrosis noted on renal sono.  Per chart pt is uncooperative at times at baseline and came to ED with Loredo.  Hosp course notable for PRABHU, hematuria, s/p replacement which pt pulled out, hypernatremia, delirium, agitation.  Pt course complicated by anemia, fever, worsening PRABHU.  Palliative medicine consulted for assistance with goals of care.    1)Delirium, Agitation  -Improved today  -Related to underlying dementia with behavioral disturbance complicated by environmental change, uremia, infection  -Per chart family reports sedative medications exacerbate rather than alleviate behavioral disturbance, combativeness  -Pt has responded to Haldol 0.5mg earlier in hospital course  -Staff report pt relatively calm unless pt care or procedure initiated  -Currently on enhanced supervision for safety  -Fall precautions  -avoid anticholinergic meds as reasonable as can exacerbate delirium    2)PRABHU  -Per chart review began at [facility] roughly one month ago  - and renal input reviewed  -Imaging reviewed  -Obstructive uropathy and bladder wall thickening  -Pt s/p Loredo but pulled out and now replaced  -On IVFs  -Per renal pt poor candidate for HD due to behavioral disturbance    3)BPH  -Gu, renal, imaging reviewed  -S/p  followup with loredo replaced 12/28  -Cr improving s/p loredo placement    4)Fever  -Persistent  -Now with positive blood cxs  -ID input reviewed  -Cont IV abx    5)Anemia  -Gradually worsening since admission  -Maybe be partially dilutional due to IVFs but also with hematuria  -S/p transfusion PRBCS yest  -Monitor Hb    6)Dementia  -Chart indicates hx of behavioral disturbance  -Appears to be FAST 7B upon my assessment today- unclear what baseline PTA is  -Fall and aspiration precaution  -Currently on enhanced supervision due to delirium  -avoid anticholinergic meds as reasonable    7)Advance Directives, Goals of Care  -Pt does not have capacity to make medical decisions due to dementia  -Per staff pts daughter Amanda Basurto is pts health care agent  -Pt has no limits set on aggressive interventions at this time thus remain full code  -Message left with pts daughters to schedule family meeting- awaiting call back

## 2017-12-29 NOTE — PROGRESS NOTE ADULT - ASSESSMENT
83yo M w/PMHx of HTN, Dementia, Major Depressive  Disorder, BPH, admitted on 12/24 for evaluation of altered mental status, agitation. The patient has been having fever since admission, had loredo catheter placed on admission, however, this was traumatically removed with subsequent swelling of scrotum. Patient intermittently combative, unable to obtain history and history per medical record. Labwork within the last 24 hours shows significant drop in hgb/hct of unclear etiology.  1. Patient with persistent fever, even while on zosyn; cystitis versus microaspiration?  - now found to have Proteus in blood cultures  - will continue ceftriaxone, day #2  - tolerating antibiotics without rashes or side effects   - follow up sensitivities of blood cultures  - iv hydration and supportive care   - serial cbc and monitor temperature   - currently undergoing transfusion  - if has diarrhea should send stool for cdiff  - if drops hemoglobin again should work up for DIC or hemolysis syndrome  2. other issues; hypertension, dementia, bph  - per medicine

## 2017-12-29 NOTE — PROGRESS NOTE ADULT - PROBLEM SELECTOR PLAN 3
Likely obstructive cause given pt's history of BPH. Getting worse (Cr today 5.73 <-5.06 <- 3.79)    Urology consulted, placed coude that patient then removed (combative/dementia). Re-consulted, recommending palliative care consult due to likelihood of patient removing any catheter that is placed.  Monitor H&H given bleeding from previous catheter placement  Continue IV hydration - D5 @ 100  Continue tamsulosin  Trend BMPs  f/u nephrology consult - recommending fix obstructive uropathy from BPH and follow up urology Likely obstructive cause given pt's history of BPH. Plateaued today (Cr today 5.04 <-5.73 <-5.06 <- 3.79)    Urology consulted, placed coude that patient then removed (combative/dementia). Re-consulted, recommending palliative care consult due to likelihood of patient removing any catheter that is placed.  Monitor H&H given bleeding from previous catheter placement  Continue IV hydration - D5 @ 100  Continue tamsulosin  Trend BMPs  f/u nephrology consult - recommending fix obstructive uropathy from BPH and follow up urology. Not good HD candidate given dementia and tendency to pull out catheters. HD is not indicated at this time unless kidney function continues to deteriorate. Likely obstructive cause given pt's history of BPH, present on admission. Plateaued today (Cr today 5.04 <-5.73 <-5.06 <- 3.79). Unclear if patient has CKD because we don't know his baseline Cr level.    Urology consulted, placed coude that patient then removed (combative/dementia). Re-consulted, recommending palliative care consult due to likelihood of patient removing any catheter that is placed.  Monitor H&H given bleeding from previous catheter placement  Continue IV hydration - D5 @ 100  Continue tamsulosin  Trend BMPs  f/u nephrology consult - recommending fix obstructive uropathy from BPH and follow up urology. Not good HD candidate given dementia and tendency to pull out catheters. HD is not indicated at this time unless kidney function continues to deteriorate.

## 2017-12-29 NOTE — PROGRESS NOTE ADULT - ASSESSMENT
82 with a history of CKD, CVA, dementia, HTN here with hypernatremia and PRABHU, renal evaluation. PRABHU multifactorial from dehydration. Obstructive uropathy playing a role as well.    PRABHU  -Renal function rising from obstruction, now improving with loredo in place  - cont loredo  - >2L UOP over past 24 hours, monitor lytes daily - Mg, Phos, K, Ca  -Urology for bilateral hydro / R hydroureter (seen on renal sono)  -Palliative for GOC noted  -Patient would not be a good candidate for HD, he would likely pull out any temp or tunneled catheter leading to greater harm. Son had agreed with this    Hypernatremia  - improving with D5W  -Hypotonic IVF - cont  -Oral intake as cleared    Anemia  -Management per medical team      d/c with resident  d/c with staff

## 2017-12-29 NOTE — PROGRESS NOTE ADULT - PROBLEM SELECTOR PLAN 2
- continue tamsulosin  - catheter placed 12/28 by me, 250 cc of urine out - will obtain urine cultures immediately in a setting of fevers to make sure it is not CAUTI

## 2017-12-29 NOTE — CDI QUERY NOTE - NSCDIOTHERTXTBX_GEN_ALL_CORE_HH
Documentation on chart of PRABHU and CKD. Creatinine range since admission 3.91 - 5.06.  EGFR  range 10-14. Pt. admitted with PRABHU and obstructive uropathy. Please indicate a Stage for the CKD.  A) Stage 1  B) Stage 2  C) Stage 3  D) Stage 4  E) Stage 5
Documentation on chart of PRABHU and CKD. Creatinine range since admission 3.91 - 5.06.  EGFR  range 10-14. Pt. admitted with PRABHU and obstructive uropathy. Please indicate a Stage for the CKD.  A) Stage 1  B) Stage 2  C) Stage 3  D) Stage 4  E) Stage 5

## 2017-12-29 NOTE — PROGRESS NOTE ADULT - PROBLEM SELECTOR PLAN 1
- Likely obstructive cause given pt's history of BPH  - Urology consulted, placed coude catheter w/ return of clear urine-patient removed the cath 12/26  - continue with IV fluids  - continue tamsulosin  - nephrology following - Dr. Arjun Bishop  - care discussed with Dr. Vegas this am, will come and evaluate the patient  - CT and the US of the abdomen and pelvis reviewed - Chronic kidney disease stage IV  - Likely obstructive cause given pt's history of BPH  - Urology consulted, placed coude catheter w/ return of clear urine-patient removed the cath 12/26  - continue with IV fluids  - continue tamsulosin  - nephrology following - Dr. Arjun Bishop  - care discussed with Dr. Vegas this am, will come and evaluate the patient  - CT and the US of the abdomen and pelvis reviewed

## 2017-12-29 NOTE — PROGRESS NOTE ADULT - PROBLEM SELECTOR PLAN 1
New onset as of past 1-2 days.    Spoke to HCP Amanda and son Bob Sauer, both are agreeable to transfusing patient. Will give 2x pRBC & follow post-transfusion CBC  f/u CT abd/pel non-con today New onset as of 12/27-12/28. s/p 2u pRBC on 12/28 with appropriate increase to 9.5/28.8 on 12/29/17.    Continue to trend CBC  CT abd/pel performed 12/28 did not show source of bleeding

## 2017-12-29 NOTE — PROGRESS NOTE ADULT - ASSESSMENT
82M w/ PMH as above admitted with PRABHU due to likely obstructive uropathy. Kidney function continues to worsen, placed Chance @ bedside today without issues. However, acute drop in H&H today (6.7/21.3 from 7.7/23.5 yesterday) from unknown bleeding source. There has been blood in  urine but unlikely source of acute drop. 82M w/ PMH as above admitted with PRABHU due to likely obstructive uropathy. Kidney function has stabilized since placing Chance @ bedside 12/28/17 without issues. However, acute drop in H&H 12/28 (6.7/21.3 from 7.7/23.5 12/27) from unknown bleeding source. There has been blood in  urine but unlikely source of acute drop. Patient received 2x pRBC 12/28 with appropriate increase in H&H.

## 2017-12-29 NOTE — PROGRESS NOTE ADULT - SUBJECTIVE AND OBJECTIVE BOX
HPI:  83yo M w/PMHx of HTN, Dementia, Major Depressive  Disorder, BPH, admitted on  for evaluation of altered mental status, agitation. The patient has been having fever since admission, had loredo catheter placed on admission, however, this was traumatically removed with subsequent swelling of scrotum. Patient intermittently combative, unable to obtain history and history per medical record. Labwork within the last 24 hours shows significant drop in hgb/hct of unclear etiology.  Today  patient sleeping, is afebrile, but had fever to 101 overnite    MEDICATIONS  (STANDING):  cefTRIAXone Injectable 1000 milliGRAM(s) IV Push every 24 hours  dextrose 5%. 1000 milliLiter(s) (60 mL/Hr) IV Continuous <Continuous>  finasteride 5 milliGRAM(s) Oral daily  naphazoline/pheniramine Solution 1 Drop(s) Both EYES two times a day  polyethylene glycol 3350 17 Gram(s) Oral two times a day  tamsulosin 0.8 milliGRAM(s) Oral at bedtime    MEDICATIONS  (PRN):  acetaminophen   Tablet. 650 milliGRAM(s) Oral every 6 hours PRN Moderate Pain (4 - 6) and fever >101F  acetaminophen  Suppository 650 milliGRAM(s) Rectal every 6 hours PRN For Temp greater than 38 C (100.4 F)      Vital Signs Last 24 Hrs  T(C): 37.7 (29 Dec 2017 11:56), Max: 39.1 (28 Dec 2017 22:55)  T(F): 99.9 (29 Dec 2017 11:56), Max: 102.3 (28 Dec 2017 22:55)  HR: 92 (29 Dec 2017 11:56) (72 - 92)  BP: 99/42 (29 Dec 2017 11:56) (84/50 - 130/60)  BP(mean): --  RR: 16 (29 Dec 2017 11:56) (16 - 20)  SpO2: 99% (29 Dec 2017 11:56) (96% - 100%)    Physical Exam:        Daily     Daily   Constitutional: frail looking  HEENT: NC/AT, EOMI, PERRLA  Neck: supple  Respiratory: clear, no r/r/w, though somnolent poor inspiratory effort  Cardiovascular: S1S2 regular, no murmurs  Abdomen: soft, not tender, not distended, positive BS  Genitourinary: loredo catheter in place, edematous scrotum  Rectal: deferred  Musculoskeletal: no muscle tenderness, no joint swelling or tenderness  Neurological: somnolent, moving all extremities, no focal deficits  Skin: no rashes      Labs:                        9.5    8.9   )-----------( 109      ( 29 Dec 2017 07:21 )             28.8         143  |  114<H>  |  69<H>  ----------------------------<  141<H>  4.1   |  22  |  5.04<H>    Ca    8.1<L>      29 Dec 2017 07:21             Cultures:       Culture - Blood (collected 17 @ 06:48)  Source: .Blood None  Gram Stain (17 @ 17:16):    Growth in aerobic bottle: Gram Negative Rods  Preliminary Report (17 @ 17:15):    Growth in aerobic bottle: Gram Negative Rods    ***Blood Panel PCR results on this specimen are available    approximately 3 hours after the Gram stain result.***    Gram stain, PCR, and/or culture results may not always    correspond due to difference in methodologies.    ************************************************************    This PCR assay was performed using [employer].    The following targets are tested for: Enterococcus,    vancomycin resistant enterococci, Listeria monocytogenes,    coagulase negative staphylococci, S. aureus,    methicillin resistant S. aureus, Streptococcus agalactiae    (Group B), S. pneumoniae, S. pyogenes (Group A),    Acinetobacter baumannii, Enterobacter cloacae, E. coli,    Klebsiella oxytoca, K. pneumoniae, Proteus sp.,    Serratia marcescens, Haemophilus influenzae,    Neisseria meningitidis, Pseudomonas aeruginosa, Candida    albicans, C. glabrata, C krusei, C parapsilosis,    C. tropicalis and the KPC resistance gene.  Organism: Blood Culture PCR (17 @ 19:13)  Organism: Blood Culture PCR (17 @ 19:13)      -  Proteus species: Detec      Method Type: PCR    Culture - Blood (collected 17 @ 02:27)  Source: .Blood None  Preliminary Report (17 @ 10:01):    No growth to date.    Culture - Urine (collected 17 @ 23:27)  Source: .Urine Clean Catch (Midstream)  Final Report (17 @ 11:19):    No growth                              6.7    8.5   )-----------( 77       ( 28 Dec 2017 07:34 )             21.3         145  |  114<H>  |  73<H>  ----------------------------<  140<H>  4.3   |  20<L>  |  5.73<H>    Ca    8.1<L>      28 Dec 2017 07:34  Phos  2.4              Urinalysis Basic - ( 27 Dec 2017 02:40 )    Color: Red / Appearance: Slightly Turbid / S.010 / pH: x  Gluc: x / Ketone: Negative  / Bili: Negative / Urobili: Negative mg/dL   Blood: x / Protein: 100 mg/dL / Nitrite: Negative   Leuk Esterase: Moderate / RBC: >50 /HPF / WBC 6-10   Sq Epi: x / Non Sq Epi: Occasional / Bacteria: Many    Culture - Urine (17 @ 23:27)    Specimen Source: .Urine Clean Catch (Midstream)    Culture Results:   No growth      Culture - Blood (17 @ 06:48)    Specimen Source: .Blood None    Culture Results:   No growth to date.    Culture - Blood (17 @ 02:27)    Specimen Source: .Blood None    Culture Results:   No growth to date.              Radiology:< from: CT Abdomen and Pelvis No Cont (17 @ 12:24) >    EXAM:  CT ABDOMEN AND PELVIS                            PROCEDURE DATE:  2017          INTERPRETATION:  Clinical information: Troponin hemoglobin, assess for   retroperitoneal hemorrhage    Comparison:     PROCEDURE:     CT of the Abdomen and Pelvis was performed without intravenous contrast.    Evaluation of abdominal and pelvic viscera, lymph nodes and vasculature   is limited without intravenous contrast.    FINDINGS:    LOWER CHEST: Trace effusions and atelectasis    ABDOMEN:  LIVER: within normal limits  BILE DUCTS: normal caliber  GALLBLADDER: no calcified gallstones. normal caliber wall  PANCREAS: within normal limits  SPLEEN: within normal limits  ADRENALS: within normal limits  KIDNEYS: Moderate bilateral hydronephrosis,grossly stable.    PELVIS:  REPRODUCTIVE ORGANS: no pelvic masses  BLADDER: Thickened, contracted around Loredo catheter. Perivesical Fat   stranding, appears grossly similar to prior.    BOWEL: within normal limits  PERITONEUM: no ascites or free air, no fluid collection.  RETROPERITONEUM: no enlarged retroperitoneal or pelvic nodes. No   retroperitoneal or obvious soft tissue hemorrhage.    VESSELS: IVC filter.  ABDOMINAL WALL: within normal limits.  MUSCULOSKELETAL: Right hip arthroplasty. T12 vertebral compression   deformity.    IMPRESSION:     No retroperitoneal hemorrhage. Other findings as above necrosis stable   .      < end of copied text >        < from: US Testicles (17 @ 16:01) >    EXAM:  US SCROTUM AND CONTENTS                            PROCEDURE DATE:  2017          INTERPRETATION:      Ultrasound of the testes and scrotum         CLINICAL INFORMATION:      Scrotal ecchymosis    TECHNIQUE:   Transcutaneous sonographywas performed.  Doppler color and   spectral techniques were employed as part of this exam to evaluate   vascular structures of each hemiscrotum with regard to patency and flow   characteristics.    FINDINGS:  No comparison studies are available for review.      The right testis measures 3.8 x 1.8 x 2.0 cm.  There is intact Doppler   flow within testicular parenchyma.  No focal lesion is found.  Testicular   contours remain smooth.  The right epididymal head measures 1.0 cm and   appears unremarkable.  There is a physiologic amount of fluid in the   right hemiscrotum.         The left testis measures 3.0 x 1.6 x 2.6 cm.  There is intact Doppler   flow within testicular parenchyma.  No focal lesion is found. A 7 mm   scrotal calcification is seen, a benign finding Testicular contours   remain smooth.  The left epididymal head measures 1.1 cm and appears   unremarkable.  There is a physiologic amount of fluid in the left   hemiscrotum.      Moderate scrotal edema is noted.      IMPRESSION: Moderate scrotal edema.        1.   Right hemiscrotum:  Unremarkable.        2.   Left hemiscrotum:  Unremarkable.         < end of copied text >        Advanced directive addressed: full resuscitation

## 2017-12-29 NOTE — PROGRESS NOTE ADULT - PROBLEM SELECTOR PLAN 2
Unchanged, started overnight 12/26 - 12/27. Unclear source.    Started zosyn  Continue tylenol PRN fevers  f/u CT abd/pel non-con today  LA was normal, f/u Ucx  Bcx have been negative Unchanged, started overnight 12/26 - 12/27. Bcx from 12/27 growing GNRs w/ proteus. Febrile overnight, however decreased temp this AM.    As per ID, changed to ceftriaxone  Continue tylenol PRN fevers  LA was normal, f/u Ucx

## 2017-12-29 NOTE — PROGRESS NOTE ADULT - PROBLEM SELECTOR PLAN 8
Will hold chemical DVT prophylaxis given hematuria Will hold chemical DVT prophylaxis given acute anemia, will restart when H&H stabilizes.

## 2017-12-29 NOTE — PROGRESS NOTE ADULT - SUBJECTIVE AND OBJECTIVE BOX
COVERING FOR DR. BARR -.	    81 y/o M with a history of CKD, CVA, dementia, HTN here with hypernatremia and PRABHU, renal evaluation.      - s/p loredo placement, 2.2L output over past 24 hours, renal function improving    REVIEW OF SYSTEMS:  UTO, dementia    MEDICATIONS  (STANDING):  cefTRIAXone Injectable 1000 milliGRAM(s) IV Push every 24 hours  dextrose 5%. 1000 milliLiter(s) (50 mL/Hr) IV Continuous <Continuous>  finasteride 5 milliGRAM(s) Oral daily  naphazoline/pheniramine Solution 1 Drop(s) Both EYES two times a day  polyethylene glycol 3350 17 Gram(s) Oral two times a day  tamsulosin 0.8 milliGRAM(s) Oral at bedtime      Vital Signs Last 24 Hrs  T(C): 37.3 (29 Dec 2017 14:40), Max: 39.1 (28 Dec 2017 22:55)  T(F): 99.2 (29 Dec 2017 14:40), Max: 102.3 (28 Dec 2017 22:55)  HR: 92 (29 Dec 2017 11:56) (72 - 92)  BP: 99/42 (29 Dec 2017 11:56) (99/42 - 130/60)  BP(mean): --  RR: 16 (29 Dec 2017 11:56) (16 - 20)  SpO2: 99% (29 Dec 2017 11:56) (96% - 100%)  Daily     Daily   I&O's Summary    28 Dec 2017 07:  -  29 Dec 2017 07:00  --------------------------------------------------------  IN: 784 mL / OUT: 2275 mL / NET: -1491 mL    29 Dec 2017 07:  -  29 Dec 2017 15:28  --------------------------------------------------------  IN: 990 mL / OUT: 1200 mL / NET: -210 mL          PHYSICAL EXAM:    Constitutional: NAD, sleeping comfortably  HEENT: PERRLA, EOMI,  MMM  Neck: No LAD, No JVD  Respiratory: CTAB, no wheeze  Cardiovascular: S1 and S2, RRR  Gastrointestinal: BS+, soft, NT/ND  Extremities: No peripheral edema  Neurological: Asleep  :  +Loredo with >1L medium yellow urine output  Skin: No rashes  Access: Not applicable        LABS:                                    9.5    8.9   )-----------( 109      ( 29 Dec 2017 07:21 )             28.8                           12    143  |  114<H>  |  69<H>  ----------------------------<  141<H>  4.1   |  22  |  5.04<H>    Ca    8.1<L>      29 Dec 2017 07:21      28 Dec 2017 07:34    145    |  114    |  73     ----------------------------<  140    4.3     |  20     |  5.73   27 Dec 2017 06:48    148    |  117    |  64     ----------------------------<  185    4.3     |  24     |  5.06   25 Dec 2017 18:15    155    |  124    |  59     ----------------------------<  107    4.4     |  22     |  3.79   25 Dec 2017 08:30    158    |  126    |  65     ----------------------------<  113    4.7     |  25     |  3.93   25 Dec 2017 06:00    158    |  125    |  67     ----------------------------<  121    4.7     |  25     |  3.95     Ca    8.1        28 Dec 2017 07:34  Ca    8.4        27 Dec 2017 06:48  Ca    8.3        25 Dec 2017 18:15  Ca    8.4        25 Dec 2017 08:30  Ca    8.3        25 Dec 2017 06:00  Phos  2.4       27 Dec 2017 06:48    TPro  6.8    /  Alb  2.9    /  TBili  0.5    /  DBili  x      /  AST  13     /  ALT  18     /  AlkPhos  97     24 Dec 2017 22:29          Urine Studies:  Urinalysis Basic - ( 27 Dec 2017 02:40 )    Color: Red / Appearance: Slightly Turbid / S.010 / pH: x  Gluc: x / Ketone: Negative  / Bili: Negative / Urobili: Negative mg/dL   Blood: x / Protein: 100 mg/dL / Nitrite: Negative   Leuk Esterase: Moderate / RBC: >50 /HPF / WBC 6-10   Sq Epi: x / Non Sq Epi: Occasional / Bacteria: Many            RADIOLOGY & ADDITIONAL STUDIES:    < from: US Kidney and Bladder (..17 @ 08:36) >  EXAM:  US KIDNEYS AND BLADDER                            PROCEDURE DATE:  2017          INTERPRETATION:      Ultrasound of the kidneys         CLINICAL INFORMATION:    Hydronephrosis seen on CT scan    TECHNIQUE:  Transabdominal sonography was performed.    FINDINGS:   CT dated 2017 available for review.    The right kidney measures 8.0 cm in length. Its contours are smooth.  No   focal lesion is found.  No calculi are seen. Moderate hydronephrosis and   proximal hydroureter is present.     The left kidney measures 9.8 cm in length. Its contours are smooth.  No   focal lesion is found.  No calculi are seen. Moderate hydronephrosis is   present.    The abdominal aorta measures normal caliber anterior to posterior.  The   IVC and retroperitoneal structures appear intact. Loredo catheter is seen   within the bladder.      IMPRESSION: Moderate BILATERAL hydronephrosis and proximal RIGHT   hydroureter noted.    < end of copied text >

## 2017-12-29 NOTE — PROGRESS NOTE ADULT - SUBJECTIVE AND OBJECTIVE BOX
82M w/ PMH of HTN, dementia, MDD, BPH was BIBA from [place] for decreasing kidney function (BUN/Cr = 74/4.10). Pt's baseline mental status is disoriented and combative. As per [facility] staff/documentation - previous BUN/Cr was 46/1.82. Renal sonogram 11/18/17 showed questionable hydronephrosis. Could not provide the reason behind the sonogram work up.  12/12 BUN/cr = 60/3.67. 12/21 BUN/cr = 73/3.87; at which point he was started on IVF hydration. On 12/22 BUN/Cr worse at 81/4.29, then 12/23 BUN/Cr 74/4.10 and had Loredo inserted that day 12/23/17.   Pt is on medication for BPH only because his family is refusing medication for tx of his other diagnoses.    Pt is full code per accompanying MOLST form.    12/26: Pt seen and examined at bedside. Was combative ripped the cath  received 0.5 mg of haloperidol he was comfortable and resting. D/W with the patient son his baseline is combative and aggressive he is not on any medication he becomes more aggressive if started on the medication. Overnight fever 100.3,no fever urine cultures NGTD.    12/27: seen and eval. overnight fevers. worsening renal function. Poor historian and patient is not waking up this am. Scrotal region swollen with red/blue discoloration. Care discussed with Dr. Vegas and asked if the loredo can be placed in.     12/28: seen and eval. hemodynamically stable. Patient is sick, deconditioned, elderly male. non - responsive. patient is continuing to spike fevers. Labs grossly abnormal. Care discussed with Dr. Arjun Bishop who identifies elevated Cr as obstructive cause. Patient care thoroughly discussed with patient's family - family is aware of patient's overall poor clinical state. I will attempt to place a loredo in to monitor I/O as well as relieve the obstruction. patient will also get 2 units of PRBCs. Care discussed with Pallative care team and Dr. Arjun Bishop.     12/29: patient looks clinically better. more awake. poor historian. loredo in place with clear urine.     ROS  Unable to assess      PHYSICAL EXAM:  ICU Vital Signs Last 24 Hrs  T(C): 37.3 (29 Dec 2017 14:40), Max: 39.1 (28 Dec 2017 22:55)  T(F): 99.2 (29 Dec 2017 14:40), Max: 102.3 (28 Dec 2017 22:55)  HR: 92 (29 Dec 2017 11:56) (72 - 92)  BP: 99/42 (29 Dec 2017 11:56) (99/42 - 130/60)  BP(mean): --  ABP: --  ABP(mean): --  RR: 16 (29 Dec 2017 11:56) (16 - 20)  SpO2: 99% (29 Dec 2017 11:56) (96% - 100%)    GENERAL: NAD, frail, elderly, deconditioned  EYES: EOMI, PERRLA, no scleral icterus  HEENT: Moist mucous membranes  NECK: Supple, No JVD  CNS: Motor Strength 5/5 B/L upper and lower extremities; DTRs 2+ intact   LUNG: Clear to auscultation bilaterally; No rales, rhonchi, wheezing, or rubs  HEART: RRR; No murmurs, rubs, or gallops  ABDOMEN: ST/ND/NT  GENITOURINARY- loredo in place - placed by me 12/29  EXTREMITIES:  2+ Peripheral Pulses, No clubbing, cyanosis, or edema  MUSCULOSKELETAL- Joints normal ROM, no Muscle or joint tenderness    Labs:       CBC Full  -  ( 29 Dec 2017 07:21 )  WBC Count : 8.9 K/uL  Hemoglobin : 9.5 g/dL  Hematocrit : 28.8 %  Platelet Count - Automated : 109 K/uL    143  |  114<H>  |  69<H>  ----------------------------<  141<H>  4.1   |  22  |  5.04<H>    Ca    8.1<L>      29 Dec 2017 07:21    CT Abdomen and Pelvis No Cont   EXAM:  CT ABDOMEN AND PELVIS                        PROCEDURE DATE:  12/25/2017  IMPRESSION:   Moderate to severe bilateral hydroureteronephrosis. Markedly irregular   thickening of the bladder wall with perivesicular inflammatory changes.   Enlarged prostate. Correlate with cystoscopy.

## 2017-12-29 NOTE — PROGRESS NOTE ADULT - SUBJECTIVE AND OBJECTIVE BOX
82M w/ PMH of HTN, dementia, MDD, BPH was BIBA from [facility] for decreasing kidney function (BUN/Cr = 74/4.10). Pt's baseline mental status is disoriented and combative. As per [facility] staff/documentation - previous BUN/Cr was 46/1.82. Renal sonogram 17 showed questionable hydronephrosis. Could not provide the reason behind the sonogram work up.   BUN/cr = 60/3.67.  BUN/cr = 73/3.87; at which point he was started on IVF hydration. On  BUN/Cr worse at 81/4.29, then  BUN/Cr 74/4.10 and had Loredo inserted that day 17.   Pt is on medication for BPH only because his family is refusing medication for tx of his other diagnoses.    Pt is full code per accompanying MOLST form.    17: Pt seen and examined at bedside. Pt is AOx1 and is more awake today, but still cannot provide history or ROS.    ROS  Unable to assess    Vital Signs Last 24 Hrs  T(C): 38.1 (17 @ 11:52)  T(F): 100.5 (17 @ 11:52), Max: 102.6 (17 @ 17:11)  HR: 98 (17 @ 11:52) (90 - 105)  BP: 117/63 (17 @ 11:52)  BP(mean): --  RR: 18 (17 @ 11:52) (16 - 20)  SpO2: 100% (17 @ 11:52) (100% - 100%)  Wt(kg): --     @ 07:01  -   @ 07:00  --------------------------------------------------------  IN: 1100 mL / OUT: 275 mL / NET: 825 mL     @ 07:01  -   @ 13:10  --------------------------------------------------------  IN: 120 mL / OUT: 0 mL / NET: 120 mL    PHYSICAL EXAM:    GENERAL: AOx1, NAD, well-groomed, well-developed. Combative  HEAD:  NC/AT  EYES: EOMI, PERRLA, no scleral icterus  HEENT: Moist mucous membranes  NECK: Supple, No JVD  CNS: Motor Strength 5/5 B/L upper and lower extremities; DTRs 2+ intact   LUNG: Clear to auscultation bilaterally; No rales, rhonchi, wheezing, or rubs  HEART: RRR; No murmurs, rubs, or gallops  ABDOMEN: ST/ND/NT. + umbilical hernia, no erythema/edema. + tender on palpation.  GENITOURINARY- loredo catheter replaced by FM team  EXTREMITIES:  2+ Peripheral Pulses, No clubbing, cyanosis, or edema  MUSCULOSKELETAL- Joints normal ROM, no Muscle or joint tenderness    Lab Results:                                            6.7                   Neurophils% (auto):   x      ( @ 07:34):    8.5  )-----------(77           Lymphocytes% (auto):  x                                             21.3                   Eosinphils% (auto):   x        Manual%: Neutrophils x    ; Lymphocytes x    ; Eosinophils x    ; Bands%: x    ; Blasts x         Differential:	[] Automated		[] Manual        145  |  114<H>  |  73<H>  ----------------------------<  140<H>  4.3   |  20<L>  |  5.73<H>    Ca    8.1<L>      28 Dec 2017 07:34  Phos  2.4         Urinalysis Basic - ( 27 Dec 2017 02:40 )    Color: Red / Appearance: Slightly Turbid / S.010 / pH: x  Gluc: x / Ketone: Negative  / Bili: Negative / Urobili: Negative mg/dL   Blood: x / Protein: 100 mg/dL / Nitrite: Negative   Leuk Esterase: Moderate / RBC: >50 /HPF / WBC 6-10   Sq Epi: x / Non Sq Epi: Occasional / Bacteria: Many    IMAGING    < from: CT Abdomen and Pelvis No Cont (12.25.17 @ 07:07) >  EXAM:  CT ABDOMEN AND PELVIS                            PROCEDURE DATE:  2017  IMPRESSION:     Moderate to severe bilateral hydroureteronephrosis. Markedly irregular   thickening of the bladder wall with perivesicular inflammatory changes.   Enlarged prostate. Correlate with cystoscopy.    < end of copied text > 82M w/ PMH of HTN, dementia, MDD, BPH was BIBA from [place] for decreasing kidney function (BUN/Cr = 74/4.10). Pt's baseline mental status is disoriented and combative. As per [facility] staff/documentation - previous BUN/Cr was 46/1.82. Renal sonogram 11/18/17 showed questionable hydronephrosis. Could not provide the reason behind the sonogram work up.  12/12 BUN/cr = 60/3.67. 12/21 BUN/cr = 73/3.87; at which point he was started on IVF hydration. On 12/22 BUN/Cr worse at 81/4.29, then 12/23 BUN/Cr 74/4.10 and had Loredo inserted that day 12/23/17.   Pt is on medication for BPH only because his family is refusing medication for tx of his other diagnoses.    Pt is full code per accompanying MOLST form.    12/29/17: Pt seen and examined at bedside. Pt is AOx1 and is more awake today, but still cannot provide history or ROS. Mittens in place, Loredo in place.    ROS  Unable to assess    Vital Signs Last 24 Hrs  T(C): 37.7 (12-29-17 @ 11:56)  T(F): 99.9 (12-29-17 @ 11:56), Max: 102.3 (12-28-17 @ 22:55)  HR: 92 (12-29-17 @ 11:56) (72 - 92)  BP: 99/42 (12-29-17 @ 11:56)  BP(mean): --  RR: 16 (12-29-17 @ 11:56) (16 - 20)  SpO2: 99% (12-29-17 @ 11:56) (96% - 100%)  Wt(kg): --    12-28 @ 07:01  -  12-29 @ 07:00  --------------------------------------------------------  IN: 784 mL / OUT: 2275 mL / NET: -1491 mL    PHYSICAL EXAM:    GENERAL: AOx1, NAD, well-groomed, well-developed. Combative  HEAD:  NC/AT  EYES: EOMI, PERRLA, no scleral icterus  HEENT: Moist mucous membranes  NECK: Supple, No JVD  CNS: Motor Strength 5/5 B/L upper and lower extremities; DTRs 2+ intact   LUNG: Clear to auscultation bilaterally; No rales, rhonchi, wheezing, or rubs  HEART: RRR; No murmurs, rubs, or gallops  ABDOMEN: ST/ND/NT. + umbilical hernia, no erythema/edema. Non tender on palpation.  GENITOURINARY- loredo catheter draining clear yellow urine  EXTREMITIES:  2+ Peripheral Pulses, No clubbing, cyanosis, or edema  MUSCULOSKELETAL- Joints normal ROM, no Muscle or joint tenderness    Lab Results:                                            9.5                   Neurophils% (auto):   x      (12-29 @ 07:21):    8.9  )-----------(109          Lymphocytes% (auto):  x                                             28.8                   Eosinphils% (auto):   x        Manual%: Neutrophils x    ; Lymphocytes x    ; Eosinophils x    ; Bands%: x    ; Blasts x         Differential:	[] Automated		[] Manual    12-29    143  |  114<H>  |  69<H>  ----------------------------<  141<H>  4.1   |  22  |  5.04<H>    Ca    8.1<L>      29 Dec 2017 07:21    Culture - Blood (12.27.17 @ 06:48)    Gram Stain:   Growth in aerobic bottle: Gram Negative Rods    -  Proteus species: Detec    Specimen Source: .Blood None    Organism: Blood Culture PCR    Culture Results:   Growth in aerobic bottle: Gram Negative Rods  ***Blood Panel PCR results on this specimen are available  approximately 3 hours after the Gram stain result.***  Gram stain, PCR, and/or culture results may not always  correspond due to difference in methodologies.  ************************************************************  This PCR assay was performed using [employer].  The following targets are tested for: Enterococcus,  vancomycin resistant enterococci, Listeria monocytogenes,  coagulase negative staphylococci, S. aureus,  methicillin resistant S. aureus, Streptococcus agalactiae  (Group B), S. pneumoniae, S. pyogenes (Group A),  Acinetobacter baumannii, Enterobacter cloacae, E. coli,  Klebsiella oxytoca, K. pneumoniae, Proteus sp.,  Serratia marcescens, Haemophilus influenzae,  Neisseria meningitidis, Pseudomonas aeruginosa, Candida  albicans, C. glabrata, C krusei, C parapsilosis,  C. tropicalis and the KPC resistance gene.    Organism Identification: Blood Culture PCR    Method Type: PCR        IMAGING    < from: US Kidney and Bladder (12.29.17 @ 08:36) >  EXAM:  US KIDNEYS AND BLADDER                            PROCEDURE DATE:  12/29/2017    IMPRESSION: Moderate BILATERAL hydronephrosis and proximal RIGHT   hydroureter noted.    < end of copied text >    < from: CT Abdomen and Pelvis No Cont (12.28.17 @ 12:24) >  EXAM:  CT ABDOMEN AND PELVIS                            PROCEDURE DATE:  12/28/2017    IMPRESSION:     No retroperitoneal hemorrhage. Other findings as above necrosis stable   12/25.      < end of copied text >    < from: CT Abdomen and Pelvis No Cont (12.25.17 @ 07:07) >  EXAM:  CT ABDOMEN AND PELVIS                            PROCEDURE DATE:  12/25/2017  IMPRESSION:     Moderate to severe bilateral hydroureteronephrosis. Markedly irregular   thickening of the bladder wall with perivesicular inflammatory changes.   Enlarged prostate. Correlate with cystoscopy.    < end of copied text >

## 2017-12-29 NOTE — PROGRESS NOTE ADULT - PROBLEM SELECTOR PLAN 5
Improving, today 148 <- 155    Continue D5@100  Serial BMPs Improving, today 143 <-148 <- 155    Continue D5, will decrease dose to 75  Serial BMPs Improving, today 143 <-148 <- 155    Continue D5, will continue at 60 cc/hr  Serial BMPs

## 2017-12-30 LAB
-  AMIKACIN: SIGNIFICANT CHANGE UP
-  AMPICILLIN/SULBACTAM: SIGNIFICANT CHANGE UP
-  AMPICILLIN: SIGNIFICANT CHANGE UP
-  AZTREONAM: SIGNIFICANT CHANGE UP
-  CEFAZOLIN: SIGNIFICANT CHANGE UP
-  CEFEPIME: SIGNIFICANT CHANGE UP
-  CEFOXITIN: SIGNIFICANT CHANGE UP
-  CEFTAZIDIME: SIGNIFICANT CHANGE UP
-  CEFTRIAXONE: SIGNIFICANT CHANGE UP
-  CIPROFLOXACIN: SIGNIFICANT CHANGE UP
-  ERTAPENEM: SIGNIFICANT CHANGE UP
-  GENTAMICIN: SIGNIFICANT CHANGE UP
-  LEVOFLOXACIN: SIGNIFICANT CHANGE UP
-  MEROPENEM: SIGNIFICANT CHANGE UP
-  PIPERACILLIN/TAZOBACTAM: SIGNIFICANT CHANGE UP
-  TOBRAMYCIN: SIGNIFICANT CHANGE UP
-  TRIMETHOPRIM/SULFAMETHOXAZOLE: SIGNIFICANT CHANGE UP
ADD ON TEST-SPECIMEN IN LAB: SIGNIFICANT CHANGE UP
ANION GAP SERPL CALC-SCNC: 9 MMOL/L — SIGNIFICANT CHANGE UP (ref 5–17)
BUN SERPL-MCNC: 59 MG/DL — HIGH (ref 7–23)
CALCIUM SERPL-MCNC: 8.7 MG/DL — SIGNIFICANT CHANGE UP (ref 8.5–10.1)
CHLORIDE SERPL-SCNC: 116 MMOL/L — HIGH (ref 96–108)
CO2 SERPL-SCNC: 21 MMOL/L — LOW (ref 22–31)
CREAT SERPL-MCNC: 4.13 MG/DL — HIGH (ref 0.5–1.3)
CULTURE RESULTS: SIGNIFICANT CHANGE UP
GLUCOSE SERPL-MCNC: 126 MG/DL — HIGH (ref 70–99)
HCT VFR BLD CALC: 29.8 % — LOW (ref 39–50)
HGB BLD-MCNC: 9.8 G/DL — LOW (ref 13–17)
MAGNESIUM SERPL-MCNC: 2.4 MG/DL — SIGNIFICANT CHANGE UP (ref 1.6–2.6)
MCHC RBC-ENTMCNC: 29.4 PG — SIGNIFICANT CHANGE UP (ref 27–34)
MCHC RBC-ENTMCNC: 32.7 GM/DL — SIGNIFICANT CHANGE UP (ref 32–36)
MCV RBC AUTO: 89.8 FL — SIGNIFICANT CHANGE UP (ref 80–100)
METHOD TYPE: SIGNIFICANT CHANGE UP
ORGANISM # SPEC MICROSCOPIC CNT: SIGNIFICANT CHANGE UP
PLATELET # BLD AUTO: 126 K/UL — LOW (ref 150–400)
POTASSIUM SERPL-MCNC: 3.7 MMOL/L — SIGNIFICANT CHANGE UP (ref 3.5–5.3)
POTASSIUM SERPL-SCNC: 3.7 MMOL/L — SIGNIFICANT CHANGE UP (ref 3.5–5.3)
RBC # BLD: 3.32 M/UL — LOW (ref 4.2–5.8)
RBC # FLD: 12.7 % — SIGNIFICANT CHANGE UP (ref 10.3–14.5)
SODIUM SERPL-SCNC: 146 MMOL/L — HIGH (ref 135–145)
SPECIMEN SOURCE: SIGNIFICANT CHANGE UP
WBC # BLD: 7.1 K/UL — SIGNIFICANT CHANGE UP (ref 3.8–10.5)
WBC # FLD AUTO: 7.1 K/UL — SIGNIFICANT CHANGE UP (ref 3.8–10.5)

## 2017-12-30 RX ORDER — HALOPERIDOL DECANOATE 100 MG/ML
0.5 INJECTION INTRAMUSCULAR EVERY 12 HOURS
Qty: 0 | Refills: 0 | Status: DISCONTINUED | OUTPATIENT
Start: 2017-12-30 | End: 2018-01-03

## 2017-12-30 RX ORDER — HEPARIN SODIUM 5000 [USP'U]/ML
5000 INJECTION INTRAVENOUS; SUBCUTANEOUS EVERY 12 HOURS
Qty: 0 | Refills: 0 | Status: DISCONTINUED | OUTPATIENT
Start: 2017-12-30 | End: 2018-01-03

## 2017-12-30 RX ADMIN — Medication 1 DROP(S): at 06:02

## 2017-12-30 RX ADMIN — Medication 1 DROP(S): at 17:45

## 2017-12-30 RX ADMIN — CEFTRIAXONE 1000 MILLIGRAM(S): 500 INJECTION, POWDER, FOR SOLUTION INTRAMUSCULAR; INTRAVENOUS at 17:44

## 2017-12-30 RX ADMIN — FINASTERIDE 5 MILLIGRAM(S): 5 TABLET, FILM COATED ORAL at 12:10

## 2017-12-30 RX ADMIN — HEPARIN SODIUM 5000 UNIT(S): 5000 INJECTION INTRAVENOUS; SUBCUTANEOUS at 19:02

## 2017-12-30 RX ADMIN — TAMSULOSIN HYDROCHLORIDE 0.8 MILLIGRAM(S): 0.4 CAPSULE ORAL at 21:15

## 2017-12-30 RX ADMIN — POLYETHYLENE GLYCOL 3350 17 GRAM(S): 17 POWDER, FOR SOLUTION ORAL at 06:02

## 2017-12-30 NOTE — PROGRESS NOTE ADULT - PROBLEM SELECTOR PLAN 2
Unchanged, started overnight 12/26 - 12/27. Bcx from 12/27 growing GNRs w/ proteus. Febrile overnight, however decreased temp this AM.    As per ID, changed to ceftriaxone  Continue tylenol PRN fevers  LA was normal, f/u Ucx -Stable  -New onset as of 12/27-12/28. s/p 2u pRBC on 12/28 with appropriate increase to 9.5/28.8 on 12/29/17.  -Continue to trend CBC  -CT abd/pel performed 12/28 did not show source of bleeding

## 2017-12-30 NOTE — PROGRESS NOTE ADULT - PROBLEM SELECTOR PLAN 8
Will hold chemical DVT prophylaxis given acute anemia, will restart when H&H stabilizes. heparin 5000 sq 12 hrs

## 2017-12-30 NOTE — PROGRESS NOTE ADULT - PROBLEM SELECTOR PLAN 1
- acute on Chronic kidney disease stage IV  - Likely obstructive cause given pt's history of BPH - now with loredo  - Urology consulted, placed coude catheter w/ return of clear urine-patient removed the cath 12/26  - continue with IV fluids  - continue tamsulosin  - nephrology following - Dr. Arjun Bishop  - care discussed with Dr. Vegas this am, will come and evaluate the patient  - CT and the US of the abdomen and pelvis reviewed  - Cr is improving

## 2017-12-30 NOTE — PROGRESS NOTE ADULT - SUBJECTIVE AND OBJECTIVE BOX
82M w/ PMH of HTN, dementia, MDD, BPH was BIBA from [facility] for decreasing kidney function (BUN/Cr = 74/4.10). Pt's baseline mental status is disoriented and combative. As per [facility] staff/documentation - previous BUN/Cr was 46/1.82. Renal sonogram 11/18/17 showed questionable hydronephrosis. Could not provide the reason behind the sonogram work up.  12/12 BUN/cr = 60/3.67. 12/21 BUN/cr = 73/3.87; at which point he was started on IVF hydration. On 12/22 BUN/Cr worse at 81/4.29, then 12/23 BUN/Cr 74/4.10 and had Loredo inserted that day 12/23/17.   Pt is on medication for BPH only because his family is refusing medication for tx of his other diagnoses.    Pt is full code per accompanying MOLST form.    12/29/17: Pt seen and examined at bedside. Pt is AOx1 and is more awake today, but still cannot provide history or ROS. Mittens in place, Loredo in place.    ROS  Unable to assess    Vital Signs Last 24 Hrs  T(C): 37.7 (12-29-17 @ 11:56)  T(F): 99.9 (12-29-17 @ 11:56), Max: 102.3 (12-28-17 @ 22:55)  HR: 92 (12-29-17 @ 11:56) (72 - 92)  BP: 99/42 (12-29-17 @ 11:56)  BP(mean): --  RR: 16 (12-29-17 @ 11:56) (16 - 20)  SpO2: 99% (12-29-17 @ 11:56) (96% - 100%)  Wt(kg): --    12-28 @ 07:01  -  12-29 @ 07:00  --------------------------------------------------------  IN: 784 mL / OUT: 2275 mL / NET: -1491 mL    PHYSICAL EXAM:    GENERAL: AOx1, NAD, well-groomed, well-developed. Combative  HEAD:  NC/AT  EYES: EOMI, PERRLA, no scleral icterus  HEENT: Moist mucous membranes  NECK: Supple, No JVD  CNS: Motor Strength 5/5 B/L upper and lower extremities; DTRs 2+ intact   LUNG: Clear to auscultation bilaterally; No rales, rhonchi, wheezing, or rubs  HEART: RRR; No murmurs, rubs, or gallops  ABDOMEN: ST/ND/NT. + umbilical hernia, no erythema/edema. Non tender on palpation.  GENITOURINARY- loredo catheter draining clear yellow urine  EXTREMITIES:  2+ Peripheral Pulses, No clubbing, cyanosis, or edema  MUSCULOSKELETAL- Joints normal ROM, no Muscle or joint tenderness    Lab Results:                                            9.5                   Neurophils% (auto):   x      (12-29 @ 07:21):    8.9  )-----------(109          Lymphocytes% (auto):  x                                             28.8                   Eosinphils% (auto):   x        Manual%: Neutrophils x    ; Lymphocytes x    ; Eosinophils x    ; Bands%: x    ; Blasts x         Differential:	[] Automated		[] Manual    12-29    143  |  114<H>  |  69<H>  ----------------------------<  141<H>  4.1   |  22  |  5.04<H>    Ca    8.1<L>      29 Dec 2017 07:21    Culture - Blood (12.27.17 @ 06:48)    Gram Stain:   Growth in aerobic bottle: Gram Negative Rods    -  Proteus species: Detec    Specimen Source: .Blood None    Organism: Blood Culture PCR    Culture Results:   Growth in aerobic bottle: Gram Negative Rods  ***Blood Panel PCR results on this specimen are available  approximately 3 hours after the Gram stain result.***  Gram stain, PCR, and/or culture results may not always  correspond due to difference in methodologies.  ************************************************************  This PCR assay was performed using [employer].  The following targets are tested for: Enterococcus,  vancomycin resistant enterococci, Listeria monocytogenes,  coagulase negative staphylococci, S. aureus,  methicillin resistant S. aureus, Streptococcus agalactiae  (Group B), S. pneumoniae, S. pyogenes (Group A),  Acinetobacter baumannii, Enterobacter cloacae, E. coli,  Klebsiella oxytoca, K. pneumoniae, Proteus sp.,  Serratia marcescens, Haemophilus influenzae,  Neisseria meningitidis, Pseudomonas aeruginosa, Candida  albicans, C. glabrata, C krusei, C parapsilosis,  C. tropicalis and the KPC resistance gene.    Organism Identification: Blood Culture PCR    Method Type: PCR        IMAGING    < from: US Kidney and Bladder (12.29.17 @ 08:36) >  EXAM:  US KIDNEYS AND BLADDER                            PROCEDURE DATE:  12/29/2017    IMPRESSION: Moderate BILATERAL hydronephrosis and proximal RIGHT   hydroureter noted.    < end of copied text >    < from: CT Abdomen and Pelvis No Cont (12.28.17 @ 12:24) >  EXAM:  CT ABDOMEN AND PELVIS                            PROCEDURE DATE:  12/28/2017    IMPRESSION:     No retroperitoneal hemorrhage. Other findings as above necrosis stable   12/25.      < end of copied text >    < from: CT Abdomen and Pelvis No Cont (12.25.17 @ 07:07) >  EXAM:  CT ABDOMEN AND PELVIS                            PROCEDURE DATE:  12/25/2017  IMPRESSION:     Moderate to severe bilateral hydroureteronephrosis. Markedly irregular   thickening of the bladder wall with perivesicular inflammatory changes.   Enlarged prostate. Correlate with cystoscopy.    < end of copied text > 82M w/ PMH of HTN, dementia, MDD, BPH was BIBA from [facility] for decreasing kidney function (BUN/Cr = 74/4.10). Pt's baseline mental status is disoriented and combative. As per [facility] staff/documentation - previous BUN/Cr was 46/1.82. Renal sonogram 11/18/17 showed questionable hydronephrosis. Could not provide the reason behind the sonogram work up.  12/12 BUN/cr = 60/3.67. 12/21 BUN/cr = 73/3.87; at which point he was started on IVF hydration. On 12/22 BUN/Cr worse at 81/4.29, then 12/23 BUN/Cr 74/4.10 and had Loredo inserted that day 12/23/17.   Pt is on medication for BPH only because his family is refusing medication for tx of his other diagnoses.    Pt is full code per accompanying MOLST form.    12/30/17: Pt seen and examined at bedside. Pt is AOx1 was resting comfortable. Mittens in place as per the nurse mildly agitated added haldol 0.5 mg PO prn. Creatinine trending down. D/w with the patient son regarding the update about his father condition.    ROS  Unable to assess    Vital Signs Last 24 Hrs  T(C): 37.8 (30 Dec 2017 11:29), Max: 37.8 (30 Dec 2017 05:07)  T(F): 100 (30 Dec 2017 11:29), Max: 100 (30 Dec 2017 05:07)  HR: 75 (30 Dec 2017 11:29) (75 - 91)  BP: 139/- (30 Dec 2017 11:29) (139/- - 149/79)  BP(mean): --  RR: 16 (30 Dec 2017 11:29) (16 - 18)  SpO2: 96% (30 Dec 2017 11:29) (96% - 98%)  Wt(kg): --    12-28 @ 07:01  -  12-29 @ 07:00  --------------------------------------------------------  IN: 784 mL / OUT: 2275 mL / NET: -1491 mL    PHYSICAL EXAM:    GENERAL: AOx1, NAD, well-groomed, well-developed. resting   HEAD:  NC/AT  EYES: EOMI, PERRLA, no scleral icterus  HEENT: Moist mucous membranes  NECK: Supple, No JVD  CNS: unable to asses  LUNG: Clear to auscultation bilaterally; No rales, rhonchi, wheezing, or rubs  HEART: RRR; No murmurs, rubs, or gallops  ABDOMEN: ST/ND/NT. + umbilical hernia, no erythema/edema. Non tender on palpation.  GENITOURINARY- loredo catheter draining clear yellow urine  EXTREMITIES:  2+ Peripheral Pulses, No clubbing, cyanosis, or edema  MUSCULOSKELETAL- Joints normal ROM, no Muscle or joint tenderness    Lab Results:  Vital Signs Last 24 Hrs  T(C): 37.8 (30 Dec 2017 11:29), Max: 37.8 (30 Dec 2017 05:07)  T(F): 100 (30 Dec 2017 11:29), Max: 100 (30 Dec 2017 05:07)  HR: 75 (30 Dec 2017 11:29) (75 - 91)  BP: 139/- (30 Dec 2017 11:29) (139/- - 149/79)  BP(mean): --  RR: 16 (30 Dec 2017 11:29) (16 - 18)  SpO2: 96% (30 Dec 2017 11:29) (96% - 98%)                          9.8    7.1   )-----------( 126      ( 30 Dec 2017 07:33 )             29.8     CBC Full  -  ( 30 Dec 2017 07:33 )  WBC Count : 7.1 K/uL  Hemoglobin : 9.8 g/dL  Hematocrit : 29.8 %  Platelet Count - Automated : 126 K/uL  Mean Cell Volume : 89.8 fl  Mean Cell Hemoglobin : 29.4 pg  Mean Cell Hemoglobin Concentration : 32.7 gm/dL  Auto Neutrophil # : x  Auto Lymphocyte # : x  Auto Monocyte # : x  Auto Eosinophil # : x  Auto Basophil # : x  Auto Neutrophil % : x  Auto Lymphocyte % : x  Auto Monocyte % : x  Auto Eosinophil % : x  Auto Basophil % : x    12-30    146<H>  |  116<H>  |  59<H>  ----------------------------<  126<H>  3.7   |  21<L>  |  4.13<H>    Ca    8.7      30 Dec 2017 07:33  Mg     2.4     12-30              CAPILLARY BLOOD GLUCOSE              I&O's Detail    29 Dec 2017 07:01  -  30 Dec 2017 07:00  --------------------------------------------------------  IN:    dextrose 5%.: 990 mL  Total IN: 990 mL    OUT:    Indwelling Catheter - Urethral: 2500 mL  Total OUT: 2500 mL    Total NET: -1510 mL        I&O's Summary    29 Dec 2017 07:01  -  30 Dec 2017 07:00  --------------------------------------------------------  IN: 990 mL / OUT: 2500 mL / NET: -1510 mL                    Culture - Blood (12.27.17 @ 06:48)    Gram Stain:   Growth in aerobic bottle: Gram Negative Rods    -  Proteus species: Detec    Specimen Source: .Blood None    Organism: Blood Culture PCR    Culture Results:   Growth in aerobic bottle: Gram Negative Rods  ***Blood Panel PCR results on this specimen are available  approximately 3 hours after the Gram stain result.***  Gram stain, PCR, and/or culture results may not always  correspond due to difference in methodologies.  ************************************************************  This PCR assay was performed using [employer].  The following targets are tested for: Enterococcus,  vancomycin resistant enterococci, Listeria monocytogenes,  coagulase negative staphylococci, S. aureus,  methicillin resistant S. aureus, Streptococcus agalactiae  (Group B), S. pneumoniae, S. pyogenes (Group A),  Acinetobacter baumannii, Enterobacter cloacae, E. coli,  Klebsiella oxytoca, K. pneumoniae, Proteus sp.,  Serratia marcescens, Haemophilus influenzae,  Neisseria meningitidis, Pseudomonas aeruginosa, Candida  albicans, C. glabrata, C krusei, C parapsilosis,  C. tropicalis and the KPC resistance gene.    Organism Identification: Blood Culture PCR    Method Type: PCR        IMAGING    < from: US Kidney and Bladder (12.29.17 @ 08:36) >  EXAM:  US KIDNEYS AND BLADDER                            PROCEDURE DATE:  12/29/2017    IMPRESSION: Moderate BILATERAL hydronephrosis and proximal RIGHT   hydroureter noted.    < end of copied text >    < from: CT Abdomen and Pelvis No Cont (12.28.17 @ 12:24) >  EXAM:  CT ABDOMEN AND PELVIS                            PROCEDURE DATE:  12/28/2017    IMPRESSION:     No retroperitoneal hemorrhage. Other findings as above necrosis stable   12/25.      < end of copied text >    < from: CT Abdomen and Pelvis No Cont (12.25.17 @ 07:07) >  EXAM:  CT ABDOMEN AND PELVIS                            PROCEDURE DATE:  12/25/2017  IMPRESSION:     Moderate to severe bilateral hydroureteronephrosis. Markedly irregular   thickening of the bladder wall with perivesicular inflammatory changes.   Enlarged prostate. Correlate with cystoscopy.    < end of copied text >

## 2017-12-30 NOTE — PROGRESS NOTE ADULT - PROBLEM SELECTOR PLAN 8
- now found to have Proteus in blood cultures  - will continue ceftriaxone, day #3  - will obtain surveillance cultures  - continue with IV hydration  - ID following

## 2017-12-30 NOTE — PROGRESS NOTE ADULT - SUBJECTIVE AND OBJECTIVE BOX
82M w/ PMH of HTN, dementia, MDD, BPH was BIBA from [facility] for decreasing kidney function (BUN/Cr = 74/4.10). Pt's baseline mental status is disoriented and combative. As per [facility] staff/documentation - previous BUN/Cr was 46/1.82. Renal sonogram 11/18/17 showed questionable hydronephrosis. Could not provide the reason behind the sonogram work up.  12/12 BUN/cr = 60/3.67. 12/21 BUN/cr = 73/3.87; at which point he was started on IVF hydration. On 12/22 BUN/Cr worse at 81/4.29, then 12/23 BUN/Cr 74/4.10 and had Loredo inserted that day 12/23/17.   Pt is on medication for BPH only because his family is refusing medication for tx of his other diagnoses.    Pt is full code per accompanying MOLST form.    12/26: Pt seen and examined at bedside. Was combative ripped the cath  received 0.5 mg of haloperidol he was comfortable and resting. D/W with the patient son his baseline is combative and aggressive he is not on any medication he becomes more aggressive if started on the medication. Overnight fever 100.3,no fever urine cultures NGTD.    12/27: seen and eval. overnight fevers. worsening renal function. Poor historian and patient is not waking up this am. Scrotal region swollen with red/blue discoloration. Care discussed with Dr. Vegas and asked if the loredo can be placed in.     12/28: seen and eval. hemodynamically stable. Patient is sick, deconditioned, elderly male. non - responsive. patient is continuing to spike fevers. Labs grossly abnormal. Care discussed with Dr. Arjun Bishop who identifies elevated Cr as obstructive cause. Patient care thoroughly discussed with patient's family - family is aware of patient's overall poor clinical state. I will attempt to place a loredo in to monitor I/O as well as relieve the obstruction. patient will also get 2 units of PRBCs. Care discussed with Pallative care team and Dr. Arjun Bishop.     12/30: clinically better. poor historian. loredo in place. mitents in place. patient comfortable and is not aggitated. care discussed with Dr. Berman.     ROS  Unable to assess      PHYSICAL EXAM:  ICU Vital Signs Last 24 Hrs  T(C): 37.8 (30 Dec 2017 11:29), Max: 37.8 (30 Dec 2017 05:07)  T(F): 100 (30 Dec 2017 11:29), Max: 100 (30 Dec 2017 05:07)  HR: 75 (30 Dec 2017 11:29) (75 - 91)  BP: 139/- (30 Dec 2017 11:29) (139/- - 149/79)  RR: 16 (30 Dec 2017 11:29) (16 - 18)  SpO2: 96% (30 Dec 2017 11:29) (96% - 98%)  GENERAL: NAD, frail, elderly, deconditioned  EYES: EOMI, PERRLA, no scleral icterus  HEENT: Moist mucous membranes  NECK: Supple, No JVD  CNS: Motor Strength 5/5 B/L upper and lower extremities; DTRs 2+ intact   LUNG: Clear to auscultation bilaterally; No rales, rhonchi, wheezing, or rubs  HEART: RRR; No murmurs, rubs, or gallops  ABDOMEN: ST/ND/NT  GENITOURINARY- loredo in place - placed by me 12/29  EXTREMITIES:  2+ Peripheral Pulses, No clubbing, cyanosis, or edema  MUSCULOSKELETAL- Joints normal ROM, no Muscle or joint tenderness    Labs:       CBC Full  -  ( 30 Dec 2017 07:33 )  WBC Count : 7.1 K/uL  Hemoglobin : 9.8 g/dL  Hematocrit : 29.8 %  Platelet Count - Automated : 126 K/uL    146<H>  |  116<H>  |  59<H>  ----------------------------<  126<H>  3.7   |  21<L>  |  4.13<H>    Ca    8.7      30 Dec 2017 07:33  Mg     2.4     12-30        CT Abdomen and Pelvis No Cont   EXAM:  CT ABDOMEN AND PELVIS                        PROCEDURE DATE:  12/25/2017  IMPRESSION:   Moderate to severe bilateral hydroureteronephrosis. Markedly irregular   thickening of the bladder wall with perivesicular inflammatory changes.   Enlarged prostate. Correlate with cystoscopy.

## 2017-12-30 NOTE — PROGRESS NOTE ADULT - PROBLEM SELECTOR PLAN 1
New onset as of 12/27-12/28. s/p 2u pRBC on 12/28 with appropriate increase to 9.5/28.8 on 12/29/17.    Continue to trend CBC  CT abd/pel performed 12/28 did not show source of bleeding -PRABHU on CKD stage 5  -Creatinine improving-Postobstructive Uropathy  -Chance back in place   Likely obstructive cause given pt's history of BPH, present on admission. Unclear if patient has CKD because we don't know his baseline Cr level.  -Urology consulted, placed coude that patient then removed (combative/dementia). Re-consulted, recommending palliative care consult due to likelihood of patient removing any catheter that is placed.  -Monitor H&H given bleeding from previous catheter placement  -Continue IV hydration   -Continue tamsulosin  -f/u nephrology consult -Postobstructive uropathy- Not good HD candidate given dementia and tendency to pull out catheters. HD is not indicated at this time unless kidney function continues to deteriorate.

## 2017-12-30 NOTE — PROGRESS NOTE ADULT - ASSESSMENT
82 with a history of CKD, CVA, dementia, HTN here with hypernatremia and PRABHU, renal evaluation. PRABHU multifactorial from dehydration. Obstructive uropathy playing a role as well.    PRABHU  -Renal function rising from obstruction, now improving with loredo in place, peak SCr 5.7, now close to 4  - cont loredo  - >2L UOP over past 24 hours, monitor lytes daily - Mg, Phos, K, Ca - anticipate lyte washout and need for repletion  -Urology for bilateral hydro / R hydroureter (seen on renal sono)  -Palliative for GOC noted  -Patient would not be a good candidate for HD, he would likely pull out any temp or tunneled catheter leading to greater harm. Son had agreed with this    Hypernatremia  - improving with D5W -- today with slight rise due to post-obstructive diuresis  - cont D5W - aim to keep even to slight negative  - add free water feeds 250ml QID (tolerating fluid and solids)    Anemia  -Management per medical team      d/c with resident  d/c with staff

## 2017-12-30 NOTE — PROGRESS NOTE ADULT - PROBLEM SELECTOR PLAN 5
Improving, today 143 <-148 <- 155    Continue D5, will continue at 60 cc/hr  Serial BMPs -Improving, today  -Continue D5, will continue at 50 cc/hr and free water supplements  -Serial BMPs

## 2017-12-30 NOTE — PROGRESS NOTE ADULT - ASSESSMENT
82M w/ PMH as above admitted with PRABHU due to likely obstructive uropathy. Kidney function has stabilized since placing Chance @ bedside 12/28/17 without issues. However, acute drop in H&H 12/28 (6.7/21.3 from 7.7/23.5 12/27) from unknown bleeding source. There has been blood in  urine but unlikely source of acute drop. Patient received 2x pRBC 12/28 with appropriate increase in H&H.

## 2017-12-30 NOTE — PROGRESS NOTE ADULT - SUBJECTIVE AND OBJECTIVE BOX
HPI:  81yo M w/PMHx of HTN, Dementia, Major Depressive  Disorder, BPH, admitted on  for evaluation of altered mental status, agitation. The patient has been having fever since admission, had loredo catheter placed on admission, however, this was traumatically removed with subsequent swelling of scrotum. Patient intermittently combative, unable to obtain history and history per medical record. Labwork within the last 24 hours shows significant drop in hgb/hct of unclear etiology.  Today  patient sleeping, is afebrile, but had fever to 101 overnite  Today  patient sleeping, fevers range     MEDICATIONS  (STANDING):  cefTRIAXone Injectable 1000 milliGRAM(s) IV Push every 24 hours  dextrose 5%. 1000 milliLiter(s) (50 mL/Hr) IV Continuous <Continuous>  finasteride 5 milliGRAM(s) Oral daily  naphazoline/pheniramine Solution 1 Drop(s) Both EYES two times a day  polyethylene glycol 3350 17 Gram(s) Oral two times a day  tamsulosin 0.8 milliGRAM(s) Oral at bedtime    MEDICATIONS  (PRN):  acetaminophen   Tablet. 650 milliGRAM(s) Oral every 6 hours PRN Moderate Pain (4 - 6) and fever >101F  acetaminophen  Suppository 650 milliGRAM(s) Rectal every 6 hours PRN For Temp greater than 38 C (100.4 F)      Vital Signs Last 24 Hrs  T(C): 37.8 (30 Dec 2017 11:29), Max: 37.8 (30 Dec 2017 05:07)  T(F): 100 (30 Dec 2017 11:29), Max: 100 (30 Dec 2017 05:07)  HR: 75 (30 Dec 2017 11:29) (75 - 91)  BP: 139/- (30 Dec 2017 11:29) (139/- - 149/79)  BP(mean): --  RR: 16 (30 Dec 2017 11:29) (16 - 18)  SpO2: 96% (30 Dec 2017 11:29) (96% - 98%)    Physical Exam:            Daily     Daily   Constitutional: frail looking  HEENT: NC/AT, EOMI, PERRLA  Neck: supple  Respiratory: clear, no r/r/w, though somnolent poor inspiratory effort  Cardiovascular: S1S2 regular, no murmurs  Abdomen: soft, not tender, not distended, positive BS  Genitourinary: loredo catheter in place, edematous scrotum  Rectal: deferred  Musculoskeletal: no muscle tenderness, no joint swelling or tenderness  Neurological: somnolent, moving all extremities, no focal deficits  Skin: no rashes      Labs:           Labs:                        9.8    7.1   )-----------( 126      ( 30 Dec 2017 07:33 )             29.8     12    146<H>  |  116<H>  |  59<H>  ----------------------------<  126<H>  3.7   |  21<L>  |  4.13<H>    Ca    8.7      30 Dec 2017 07:33  Mg     2.4                  Cultures:       Culture - Urine (collected 17 @ 12:00)  Source: .Urine Catheterized  Final Report (17 @ 22:44):    <10,000 CFU/ml    Normal Urogenital fredrick present    Culture - Blood (collected 17 @ 06:48)  Source: .Blood None  Gram Stain (17 @ 17:16):    Growth in aerobic bottle: Gram Negative Rods  Preliminary Report (17 @ 19:07):    Growth in aerobic bottle: Proteus mirabilis    ***Blood Panel PCR results on this specimen are available    approximately 3 hours after the Gram stain result.***    Gram stain, PCR, and/or culture results may not always    correspond due to difference in methodologies.    ************************************************************    This PCR assay was performed using [facility].    The following targets are tested for: Enterococcus,    vancomycin resistant enterococci, Listeria monocytogenes,    coagulase negative staphylococci, S. aureus,    methicillin resistant S. aureus, Streptococcus agalactiae    (Group B), S. pneumoniae, S. pyogenes (Group A),    Acinetobacter baumannii, Enterobacter cloacae, E. coli,    Klebsiella oxytoca, K. pneumoniae, Proteus sp.,    Serratia marcescens, Haemophilus influenzae,    Neisseria meningitidis, Pseudomonas aeruginosa, Candida    albicans, C. glabrata, C krusei, C parapsilosis,    C. tropicalis and the KPC resistance gene.  Organism: Blood Culture PCR (17 @ 19:13)  Organism: Blood Culture PCR (17 @ 19:13)      -  Proteus species: Detec      Method Type: PCR    Culture - Blood (collected 17 @ 02:27)  Source: .Blood None  Preliminary Report (17 @ 10:01):    No growth to date.    Culture - Urine (collected 17 @ 23:27)  Source: .Urine Clean Catch (Midstream)  Final Report (17 @ 11:19):    No growth                           9.5    8.9   )-----------( 109      ( 29 Dec 2017 07:21 )             28.8         143  |  114<H>  |  69<H>  ----------------------------<  141<H>  4.1   |  22  |  5.04<H>    Ca    8.1<L>      29 Dec 2017 07:21                                    6.7    8.5   )-----------( 77       ( 28 Dec 2017 07:34 )             21.3         145  |  114<H>  |  73<H>  ----------------------------<  140<H>  4.3   |  20<L>  |  5.73<H>    Ca    8.1<L>      28 Dec 2017 07:34  Phos  2.4              Urinalysis Basic - ( 27 Dec 2017 02:40 )    Color: Red / Appearance: Slightly Turbid / S.010 / pH: x  Gluc: x / Ketone: Negative  / Bili: Negative / Urobili: Negative mg/dL   Blood: x / Protein: 100 mg/dL / Nitrite: Negative   Leuk Esterase: Moderate / RBC: >50 /HPF / WBC 6-10   Sq Epi: x / Non Sq Epi: Occasional / Bacteria: Many    Culture - Urine (17 @ 23:27)    Specimen Source: .Urine Clean Catch (Midstream)    Culture Results:   No growth      Culture - Blood (17 @ 06:48)    Specimen Source: .Blood None    Culture Results:   No growth to date.    Culture - Blood (17 @ 02:27)    Specimen Source: .Blood None    Culture Results:   No growth to date.              Radiology:< from: CT Abdomen and Pelvis No Cont (17 @ 12:24) >    EXAM:  CT ABDOMEN AND PELVIS                            PROCEDURE DATE:  2017          INTERPRETATION:  Clinical information: Troponin hemoglobin, assess for   retroperitoneal hemorrhage    Comparison:     PROCEDURE:     CT of the Abdomen and Pelvis was performed without intravenous contrast.    Evaluation of abdominal and pelvic viscera, lymph nodes and vasculature   is limited without intravenous contrast.    FINDINGS:    LOWER CHEST: Trace effusions and atelectasis    ABDOMEN:  LIVER: within normal limits  BILE DUCTS: normal caliber  GALLBLADDER: no calcified gallstones. normal caliber wall  PANCREAS: within normal limits  SPLEEN: within normal limits  ADRENALS: within normal limits  KIDNEYS: Moderate bilateral hydronephrosis,grossly stable.    PELVIS:  REPRODUCTIVE ORGANS: no pelvic masses  BLADDER: Thickened, contracted around Loredo catheter. Perivesical Fat   stranding, appears grossly similar to prior.    BOWEL: within normal limits  PERITONEUM: no ascites or free air, no fluid collection.  RETROPERITONEUM: no enlarged retroperitoneal or pelvic nodes. No   retroperitoneal or obvious soft tissue hemorrhage.    VESSELS: IVC filter.  ABDOMINAL WALL: within normal limits.  MUSCULOSKELETAL: Right hip arthroplasty. T12 vertebral compression   deformity.    IMPRESSION:     No retroperitoneal hemorrhage. Other findings as above necrosis stable   .      < end of copied text >        < from: US Testicles (17 @ 16:01) >    EXAM:  US SCROTUM AND CONTENTS                            PROCEDURE DATE:  2017          INTERPRETATION:      Ultrasound of the testes and scrotum         CLINICAL INFORMATION:      Scrotal ecchymosis    TECHNIQUE:   Transcutaneous sonographywas performed.  Doppler color and   spectral techniques were employed as part of this exam to evaluate   vascular structures of each hemiscrotum with regard to patency and flow   characteristics.    FINDINGS:  No comparison studies are available for review.      The right testis measures 3.8 x 1.8 x 2.0 cm.  There is intact Doppler   flow within testicular parenchyma.  No focal lesion is found.  Testicular   contours remain smooth.  The right epididymal head measures 1.0 cm and   appears unremarkable.  There is a physiologic amount of fluid in the   right hemiscrotum.         The left testis measures 3.0 x 1.6 x 2.6 cm.  There is intact Doppler   flow within testicular parenchyma.  No focal lesion is found. A 7 mm   scrotal calcification is seen, a benign finding Testicular contours   remain smooth.  The left epididymal head measures 1.1 cm and appears   unremarkable.  There is a physiologic amount of fluid in the left   hemiscrotum.      Moderate scrotal edema is noted.      IMPRESSION: Moderate scrotal edema.        1.   Right hemiscrotum:  Unremarkable.        2.   Left hemiscrotum:  Unremarkable.         < end of copied text >        Advanced directive addressed: full resuscitation

## 2017-12-30 NOTE — PROGRESS NOTE ADULT - ASSESSMENT
81yo M w/PMHx of HTN, Dementia, Major Depressive  Disorder, BPH, admitted on 12/24 for evaluation of altered mental status, agitation. The patient has been having fever since admission, had loredo catheter placed on admission, however, this was traumatically removed with subsequent swelling of scrotum. Patient intermittently combative, unable to obtain history and history per medical record. Labwork within the last 24 hours shows significant drop in hgb/hct of unclear etiology.  1. Patient with persistent fever, even while on zosyn; cystitis versus microaspiration?  - now found to have Proteus in blood cultures  - will continue ceftriaxone, day #3  - tolerating antibiotics without rashes or side effects   - follow up sensitivities of blood cultures  - iv hydration and supportive care   - serial cbc and monitor temperature   - hemoglobin seems to have stabilized  2. other issues; hypertension, dementia, bph  - per medicine

## 2017-12-30 NOTE — PROGRESS NOTE ADULT - PROBLEM SELECTOR PLAN 3
Likely obstructive cause given pt's history of BPH, present on admission. Plateaued today (Cr today 5.04 <-5.73 <-5.06 <- 3.79). Unclear if patient has CKD because we don't know his baseline Cr level.    Urology consulted, placed coude that patient then removed (combative/dementia). Re-consulted, recommending palliative care consult due to likelihood of patient removing any catheter that is placed.  Monitor H&H given bleeding from previous catheter placement  Continue IV hydration - D5 @ 100  Continue tamsulosin  Trend BMPs  f/u nephrology consult - recommending fix obstructive uropathy from BPH and follow up urology. Not good HD candidate given dementia and tendency to pull out catheters. HD is not indicated at this time unless kidney function continues to deteriorate. -Fever secondary to the Urinary tract infection  Unchanged, started overnight 12/26 - 12/27. Bcx from 12/27 growing GNRs w/ proteus.     -s/p zosyn now ceftriaxone  Continue tylenol PRN fevers  f/u Ucx-negative  -follow up with the Blood cultures repeat

## 2017-12-30 NOTE — PROGRESS NOTE ADULT - SUBJECTIVE AND OBJECTIVE BOX
COVERING FOR DR. BARR -.	    83 y/o M with a history of CKD, CVA, dementia, HTN here with hypernatremia and PRABHU, renal evaluation.      - s/p loredo placement, 2.2L output over past 24 hours, renal function improving   - appetite improving, +loredo with nonoliguric output, on D5W@50ml/h    REVIEW OF SYSTEMS:  UTO, dementia    MEDICATIONS  (STANDING):  cefTRIAXone Injectable 1000 milliGRAM(s) IV Push every 24 hours  dextrose 5%. 1000 milliLiter(s) (50 mL/Hr) IV Continuous <Continuous>  finasteride 5 milliGRAM(s) Oral daily  naphazoline/pheniramine Solution 1 Drop(s) Both EYES two times a day  polyethylene glycol 3350 17 Gram(s) Oral two times a day  tamsulosin 0.8 milliGRAM(s) Oral at bedtime      Vital Signs Last 24 Hrs  T(C): 37.8 (30 Dec 2017 05:07), Max: 37.8 (30 Dec 2017 05:07)  T(F): 100 (30 Dec 2017 05:07), Max: 100 (30 Dec 2017 05:07)  HR: 91 (30 Dec 2017 05:07) (75 - 92)  BP: 145/96 (30 Dec 2017 05:07) (99/42 - 149/79)  BP(mean): --  RR: 18 (30 Dec 2017 05:07) (16 - 18)  SpO2: 96% (30 Dec 2017 05:07) (96% - 99%)  Daily     Daily   I&O's Summary    29 Dec 2017 07:  -  30 Dec 2017 07:00  --------------------------------------------------------  IN: 990 mL / OUT: 2500 mL / NET: -1510 mL    28 Dec 2017 07:  -  29 Dec 2017 07:00  --------------------------------------------------------  IN: 784 mL / OUT: 2275 mL / NET: -1491 mL    29 Dec 2017 07:  -  29 Dec 2017 15:28  --------------------------------------------------------  IN: 990 mL / OUT: 1200 mL / NET: -210 mL          PHYSICAL EXAM:    Constitutional: NAD, awake  HEENT: PERRLA, EOMI,  MMM  Neck: No LAD, No JVD  Respiratory: CTAB, no wheeze  Cardiovascular: S1 and S2, RRR  Gastrointestinal: BS+, soft, NT/ND  Extremities: No peripheral edema  Neurological: comfortable, moving all extremeties  :  +Loreod with >1L light yellow urine output  Skin: No rashes  Access: Not applicable        LABS:                                    9.5    8.9   )-----------( 109      ( 29 Dec 2017 07:21 )             28.8                    12-30    146<H>  |  116<H>  |  59<H>  ----------------------------<  126<H>  3.7   |  21<L>  |  4.13<H>    Ca    8.7      30 Dec 2017 07:33           12-    143  |  114<H>  |  69<H>  ----------------------------<  141<H>  4.1   |  22  |  5.04<H>    Ca    8.1<L>      29 Dec 2017 07:21      28 Dec 2017 07:34    145    |  114    |  73     ----------------------------<  140    4.3     |  20     |  5.73   27 Dec 2017 06:48    148    |  117    |  64     ----------------------------<  185    4.3     |  24     |  5.06   25 Dec 2017 18:15    155    |  124    |  59     ----------------------------<  107    4.4     |  22     |  3.79   25 Dec 2017 08:30    158    |  126    |  65     ----------------------------<  113    4.7     |  25     |  3.93   25 Dec 2017 06:00    158    |  125    |  67     ----------------------------<  121    4.7     |  25     |  3.95     Ca    8.1        28 Dec 2017 07:34  Ca    8.4        27 Dec 2017 06:48  Ca    8.3        25 Dec 2017 18:15  Ca    8.4        25 Dec 2017 08:30  Ca    8.3        25 Dec 2017 06:00  Phos  2.4       27 Dec 2017 06:48    TPro  6.8    /  Alb  2.9    /  TBili  0.5    /  DBili  x      /  AST  13     /  ALT  18     /  AlkPhos  97     24 Dec 2017 22:29          Urine Studies:  Urinalysis Basic - ( 27 Dec 2017 02:40 )    Color: Red / Appearance: Slightly Turbid / S.010 / pH: x  Gluc: x / Ketone: Negative  / Bili: Negative / Urobili: Negative mg/dL   Blood: x / Protein: 100 mg/dL / Nitrite: Negative   Leuk Esterase: Moderate / RBC: >50 /HPF / WBC 6-10   Sq Epi: x / Non Sq Epi: Occasional / Bacteria: Many            RADIOLOGY & ADDITIONAL STUDIES:    < from: US Kidney and Bladder (.29.17 @ 08:36) >  EXAM:  US KIDNEYS AND BLADDER                            PROCEDURE DATE:  2017          INTERPRETATION:      Ultrasound of the kidneys         CLINICAL INFORMATION:    Hydronephrosis seen on CT scan    TECHNIQUE:  Transabdominal sonography was performed.    FINDINGS:   CT dated 2017 available for review.    The right kidney measures 8.0 cm in length. Its contours are smooth.  No   focal lesion is found.  No calculi are seen. Moderate hydronephrosis and   proximal hydroureter is present.     The left kidney measures 9.8 cm in length. Its contours are smooth.  No   focal lesion is found.  No calculi are seen. Moderate hydronephrosis is   present.    The abdominal aorta measures normal caliber anterior to posterior.  The   IVC and retroperitoneal structures appear intact. Loredo catheter is seen   within the bladder.      IMPRESSION: Moderate BILATERAL hydronephrosis and proximal RIGHT   hydroureter noted.    < end of copied text >

## 2017-12-31 LAB
ANION GAP SERPL CALC-SCNC: 9 MMOL/L — SIGNIFICANT CHANGE UP (ref 5–17)
BUN SERPL-MCNC: 51 MG/DL — HIGH (ref 7–23)
CALCIUM SERPL-MCNC: 8.9 MG/DL — SIGNIFICANT CHANGE UP (ref 8.5–10.1)
CHLORIDE SERPL-SCNC: 114 MMOL/L — HIGH (ref 96–108)
CO2 SERPL-SCNC: 25 MMOL/L — SIGNIFICANT CHANGE UP (ref 22–31)
CREAT SERPL-MCNC: 3.48 MG/DL — HIGH (ref 0.5–1.3)
GLUCOSE SERPL-MCNC: 122 MG/DL — HIGH (ref 70–99)
MAGNESIUM SERPL-MCNC: 2.3 MG/DL — SIGNIFICANT CHANGE UP (ref 1.6–2.6)
PHOSPHATE SERPL-MCNC: 3.5 MG/DL — SIGNIFICANT CHANGE UP (ref 2.5–4.5)
POTASSIUM SERPL-MCNC: 4.5 MMOL/L — SIGNIFICANT CHANGE UP (ref 3.5–5.3)
POTASSIUM SERPL-SCNC: 4.5 MMOL/L — SIGNIFICANT CHANGE UP (ref 3.5–5.3)
SODIUM SERPL-SCNC: 148 MMOL/L — HIGH (ref 135–145)

## 2017-12-31 RX ORDER — SODIUM CHLORIDE 9 MG/ML
1000 INJECTION, SOLUTION INTRAVENOUS
Qty: 0 | Refills: 0 | Status: DISCONTINUED | OUTPATIENT
Start: 2017-12-31 | End: 2018-01-01

## 2017-12-31 RX ADMIN — SODIUM CHLORIDE 100 MILLILITER(S): 9 INJECTION, SOLUTION INTRAVENOUS at 17:26

## 2017-12-31 RX ADMIN — SODIUM CHLORIDE 100 MILLILITER(S): 9 INJECTION, SOLUTION INTRAVENOUS at 11:30

## 2017-12-31 RX ADMIN — CEFTRIAXONE 1000 MILLIGRAM(S): 500 INJECTION, POWDER, FOR SOLUTION INTRAMUSCULAR; INTRAVENOUS at 17:22

## 2017-12-31 RX ADMIN — POLYETHYLENE GLYCOL 3350 17 GRAM(S): 17 POWDER, FOR SOLUTION ORAL at 17:22

## 2017-12-31 RX ADMIN — FINASTERIDE 5 MILLIGRAM(S): 5 TABLET, FILM COATED ORAL at 11:30

## 2017-12-31 RX ADMIN — Medication 1 DROP(S): at 17:23

## 2017-12-31 RX ADMIN — SODIUM CHLORIDE 50 MILLILITER(S): 9 INJECTION, SOLUTION INTRAVENOUS at 05:53

## 2017-12-31 RX ADMIN — POLYETHYLENE GLYCOL 3350 17 GRAM(S): 17 POWDER, FOR SOLUTION ORAL at 05:17

## 2017-12-31 RX ADMIN — Medication 1 DROP(S): at 05:17

## 2017-12-31 RX ADMIN — HEPARIN SODIUM 5000 UNIT(S): 5000 INJECTION INTRAVENOUS; SUBCUTANEOUS at 17:22

## 2017-12-31 RX ADMIN — HEPARIN SODIUM 5000 UNIT(S): 5000 INJECTION INTRAVENOUS; SUBCUTANEOUS at 05:17

## 2017-12-31 RX ADMIN — TAMSULOSIN HYDROCHLORIDE 0.8 MILLIGRAM(S): 0.4 CAPSULE ORAL at 23:05

## 2017-12-31 NOTE — PROGRESS NOTE ADULT - ASSESSMENT
81yo M w/PMHx of HTN, Dementia, Major Depressive  Disorder, BPH, admitted on 12/24 for evaluation of altered mental status, agitation. The patient has been having fever since admission, had loredo catheter placed on admission, however, this was traumatically removed with subsequent swelling of scrotum. Patient intermittently combative, unable to obtain history and history per medical record. Labwork within the last 24 hours shows significant drop in hgb/hct of unclear etiology.  1. Patient with persistent fever, even while on zosyn; cystitis versus microaspiration?  - now found to have Proteus in blood cultures  - will continue ceftriaxone, day #4  - repeat blood cultures pending  - tolerating antibiotics without rashes or side effects   - follow up sensitivities of blood cultures  - iv hydration and supportive care   - serial cbc and monitor temperature   - hemoglobin seems to have stabilized  2. other issues; hypertension, dementia, bph  - per medicine

## 2017-12-31 NOTE — PROGRESS NOTE ADULT - SUBJECTIVE AND OBJECTIVE BOX
82M w/ PMH of HTN, dementia, MDD, BPH was BIBA from [facility] for decreasing kidney function (BUN/Cr = 74/4.10). Pt's baseline mental status is disoriented and combative. As per [facility] staff/documentation - previous BUN/Cr was 46/1.82. Renal sonogram 11/18/17 showed questionable hydronephrosis. Could not provide the reason behind the sonogram work up.  12/12 BUN/cr = 60/3.67. 12/21 BUN/cr = 73/3.87; at which point he was started on IVF hydration. On 12/22 BUN/Cr worse at 81/4.29, then 12/23 BUN/Cr 74/4.10 and had Loredo inserted that day 12/23/17.   Pt is on medication for BPH only because his family is refusing medication for tx of his other diagnoses.    Pt is full code per accompanying MOLST form.    12/26: Pt seen and examined at bedside. Was combative ripped the cath  received 0.5 mg of haloperidol he was comfortable and resting. D/W with the patient son his baseline is combative and aggressive he is not on any medication he becomes more aggressive if started on the medication. Overnight fever 100.3,no fever urine cultures NGTD.    12/27: seen and eval. overnight fevers. worsening renal function. Poor historian and patient is not waking up this am. Scrotal region swollen with red/blue discoloration. Care discussed with Dr. Vegas and asked if the loredo can be placed in.     12/28: seen and eval. hemodynamically stable. Patient is sick, deconditioned, elderly male. non - responsive. patient is continuing to spike fevers. Labs grossly abnormal. Care discussed with Dr. Arjun Bishop who identifies elevated Cr as obstructive cause. Patient care thoroughly discussed with patient's family - family is aware of patient's overall poor clinical state. I will attempt to place a loredo in to monitor I/O as well as relieve the obstruction. patient will also get 2 units of PRBCs. Care discussed with Pallative care team and Dr. Arjun Bishop.     12/30: clinically better. poor historian. loredo in place. mitents in place. patient comfortable and is not aggitated. care discussed with Dr. Berman.     12/31: Seen and eval. hemodynamically stable. care discussed with family (Bob) over the phone. patient's kidney function is better. poor historian.     ROS  Unable to assess      PHYSICAL EXAM:  T(C): 37.1 (31 Dec 2017 05:26), Max: 37.6 (31 Dec 2017 03:07)  T(F): 98.8 (31 Dec 2017 05:26), Max: 99.7 (31 Dec 2017 03:07)  HR: 69 (31 Dec 2017 05:26) (68 - 71)  BP: 142/72 (31 Dec 2017 05:26) (142/72 - 148/71)  RR: 18 (31 Dec 2017 05:26) (18 - 18)  SpO2: 97% (31 Dec 2017 05:26) (97% - 100%)  EYES: EOMI, PERRLA, no scleral icterus  HEENT: Moist mucous membranes  NECK: Supple, No JVD  CNS: Motor Strength 5/5 B/L upper and lower extremities; DTRs 2+ intact   LUNG: Clear to auscultation bilaterally; No rales, rhonchi, wheezing, or rubs  HEART: RRR; No murmurs, rubs, or gallops  ABDOMEN: ST/ND/NT  GENITOURINARY- loredo in place - placed by me 12/29  EXTREMITIES:  2+ Peripheral Pulses, No clubbing, cyanosis, or edema  MUSCULOSKELETAL- Joints normal ROM, no Muscle or joint tenderness    Labs:       CBC Full  -  ( 30 Dec 2017 07:33 )  WBC Count : 7.1 K/uL  Hemoglobin : 9.8 g/dL  Hematocrit : 29.8 %  Platelet Count - Automated : 126 K/uL    12-31    148<H>  |  114<H>  |  51<H>  ----------------------------<  122<H>  4.5   |  25  |  3.48<H>    Ca    8.9      31 Dec 2017 09:40  Phos  3.5     12-31  Mg     2.3     12-31    CT Abdomen and Pelvis No Cont   EXAM:  CT ABDOMEN AND PELVIS                        PROCEDURE DATE:  12/25/2017  IMPRESSION:   Moderate to severe bilateral hydroureteronephrosis. Markedly irregular   thickening of the bladder wall with perivesicular inflammatory changes.   Enlarged prostate. Correlate with cystoscopy.

## 2017-12-31 NOTE — PROGRESS NOTE ADULT - SUBJECTIVE AND OBJECTIVE BOX
HPI:  81yo M w/PMHx of HTN, Dementia, Major Depressive  Disorder, BPH, admitted on  for evaluation of altered mental status, agitation. The patient has been having fever since admission, had loredo catheter placed on admission, however, this was traumatically removed with subsequent swelling of scrotum. Patient intermittently combative, unable to obtain history and history per medical record. Labwork within the last 24 hours shows significant drop in hgb/hct of unclear etiology.  Today  patient sleeping, is afebrile, but had fever to 101 overnite  Today  patient sleeping, fevers range   Today  patient sleeping, afebrile    MEDICATIONS  (STANDING):  cefTRIAXone Injectable 1000 milliGRAM(s) IV Push every 24 hours  dextrose 5%. 1000 milliLiter(s) (100 mL/Hr) IV Continuous <Continuous>  finasteride 5 milliGRAM(s) Oral daily  heparin  Injectable 5000 Unit(s) SubCutaneous every 12 hours  naphazoline/pheniramine Solution 1 Drop(s) Both EYES two times a day  polyethylene glycol 3350 17 Gram(s) Oral two times a day  tamsulosin 0.8 milliGRAM(s) Oral at bedtime    MEDICATIONS  (PRN):  acetaminophen   Tablet. 650 milliGRAM(s) Oral every 6 hours PRN Moderate Pain (4 - 6) and fever >101F  acetaminophen  Suppository 650 milliGRAM(s) Rectal every 6 hours PRN For Temp greater than 38 C (100.4 F)  haloperidol     Tablet 0.5 milliGRAM(s) Oral every 12 hours PRN agitation      Vital Signs Last 24 Hrs  T(C): 37.1 (31 Dec 2017 05:26), Max: 37.6 (31 Dec 2017 03:07)  T(F): 98.8 (31 Dec 2017 05:26), Max: 99.7 (31 Dec 2017 03:07)  HR: 69 (31 Dec 2017 05:26) (68 - 71)  BP: 142/72 (31 Dec 2017 05:26) (142/72 - 148/71)  BP(mean): --  RR: 18 (31 Dec 2017 05:26) (18 - 18)  SpO2: 97% (31 Dec 2017 05:26) (97% - 100%)    Physical Exam:              Daily     Daily   Constitutional: frail looking  HEENT: NC/AT, EOMI, PERRLA  Neck: supple  Respiratory: clear, no r/r/w, though somnolent poor inspiratory effort  Cardiovascular: S1S2 regular, no murmurs  Abdomen: soft, not tender, not distended, positive BS  Genitourinary: loredo catheter in place, edematous scrotum  Rectal: deferred  Musculoskeletal: no muscle tenderness, no joint swelling or tenderness  Neurological: somnolent, moving all extremities, no focal deficits  Skin: no rashes      Labs:           Labs:                        9.8    7.1   )-----------( 126      ( 30 Dec 2017 07:33 )             29.8         146<H>  |  116<H>  |  59<H>  ----------------------------<  126<H>  3.7   |  21<L>  |  4.13<H>    Ca    8.7      30 Dec 2017 07:33  Mg     2.4         Labs:                        9.8    7.1   )-----------( 126      ( 30 Dec 2017 07:33 )             29.8         148<H>  |  114<H>  |  51<H>  ----------------------------<  122<H>  4.5   |  25  |  3.48<H>    Ca    8.9      31 Dec 2017 09:40  Phos  3.5       Mg     2.3                  Cultures:       Culture - Urine (collected 17 @ 12:00)  Source: .Urine Catheterized  Final Report (17 @ 22:44):    <10,000 CFU/ml    Normal Urogenital fredrick present    Culture - Blood (collected 17 @ 06:48)  Source: .Blood None  Gram Stain (17 @ 17:16):    Growth in aerobic bottle: Gram Negative Rods  Final Report (17 @ 18:36):    Growth in aerobic bottle: Proteus mirabilis    ***Blood Panel PCR results on this specimen are available    approximately 3 hours after the Gram stain result.***    Gram stain, PCR, and/or culture results may not always    correspond due to difference in methodologies.    ************************************************************    This PCR assay was performed using [employer].    The following targets are tested for: Enterococcus,    vancomycin resistant enterococci, Listeria monocytogenes,    coagulase negative staphylococci, S. aureus,    methicillin resistant S. aureus, Streptococcus agalactiae    (Group B), S. pneumoniae, S. pyogenes (Group A),    Acinetobacter baumannii, Enterobacter cloacae, E. coli,    Klebsiella oxytoca, K. pneumoniae, Proteus sp.,    Serratia marcescens, Haemophilus influenzae,    Neisseria meningitidis, Pseudomonas aeruginosa, Candida    albicans, C. glabrata, C krusei, C parapsilosis,    C. tropicalis and the KPC resistance gene.  Organism: Proteus mirabilis  Blood Culture PCR (17 @ 18:36)  Organism: Blood Culture PCR (17 @ 18:36)      -  Proteus species: Detec      Method Type: PCR  Organism: Proteus mirabilis (17 @ 18:36)      -  Amikacin: S <=8      -  Ampicillin: S <=2      -  Ampicillin/Sulbactam: S <=4/2      -  Aztreonam: S <=4      -  Cefazolin: S <=2      -  Cefepime: S <=2      -  Cefoxitin: S <=4      -  Ceftazidime: S <=1      -  Ceftriaxone: S <=1      -  Ciprofloxacin: R >2      -  Ertapenem: S <=0.5      -  Gentamicin: S <=1      -  Levofloxacin: R >4      -  Meropenem: S <=1      -  Piperacillin/Tazobactam: S <=8      -  Tobramycin: S <=2      -  Trimethoprim/Sulfamethoxazole: S       Method Type: BANG    Culture - Blood (collected 17 @ 02:27)  Source: .Blood None  Preliminary Report (17 @ 10:01):    No growth to date.    Culture - Urine (collected 17 @ 23:27)  Source: .Urine Clean Catch (Midstream)  Final Report (17 @ 11:19):    No growth               Culture - Blood (collected 17 @ 02:27)  Source: .Blood None  Preliminary Report (17 @ 10:01):    No growth to date.    Culture - Urine (collected 17 @ 23:27)  Source: .Urine Clean Catch (Midstream)  Final Report (17 @ 11:19):    No growth                           9.5    8.9   )-----------( 109      ( 29 Dec 2017 07:21 )             28.8         143  |  114<H>  |  69<H>  ----------------------------<  141<H>  4.1   |  22  |  5.04<H>    Ca    8.1<L>      29 Dec 2017 07:21                                    6.7    8.5   )-----------( 77       ( 28 Dec 2017 07:34 )             21.3         145  |  114<H>  |  73<H>  ----------------------------<  140<H>  4.3   |  20<L>  |  5.73<H>    Ca    8.1<L>      28 Dec 2017 07:34  Phos  2.4              Urinalysis Basic - ( 27 Dec 2017 02:40 )    Color: Red / Appearance: Slightly Turbid / S.010 / pH: x  Gluc: x / Ketone: Negative  / Bili: Negative / Urobili: Negative mg/dL   Blood: x / Protein: 100 mg/dL / Nitrite: Negative   Leuk Esterase: Moderate / RBC: >50 /HPF / WBC 6-10   Sq Epi: x / Non Sq Epi: Occasional / Bacteria: Many    Culture - Urine (17 @ 23:27)    Specimen Source: .Urine Clean Catch (Midstream)    Culture Results:   No growth      Culture - Blood (17 @ 06:48)    Specimen Source: .Blood None    Culture Results:   No growth to date.    Culture - Blood (17 @ 02:27)    Specimen Source: .Blood None    Culture Results:   No growth to date.              Radiology:< from: CT Abdomen and Pelvis No Cont (17 @ 12:24) >    EXAM:  CT ABDOMEN AND PELVIS                            PROCEDURE DATE:  2017          INTERPRETATION:  Clinical information: Troponin hemoglobin, assess for   retroperitoneal hemorrhage    Comparison:     PROCEDURE:     CT of the Abdomen and Pelvis was performed without intravenous contrast.    Evaluation of abdominal and pelvic viscera, lymph nodes and vasculature   is limited without intravenous contrast.    FINDINGS:    LOWER CHEST: Trace effusions and atelectasis    ABDOMEN:  LIVER: within normal limits  BILE DUCTS: normal caliber  GALLBLADDER: no calcified gallstones. normal caliber wall  PANCREAS: within normal limits  SPLEEN: within normal limits  ADRENALS: within normal limits  KIDNEYS: Moderate bilateral hydronephrosis,grossly stable.    PELVIS:  REPRODUCTIVE ORGANS: no pelvic masses  BLADDER: Thickened, contracted around Loredo catheter. Perivesical Fat   stranding, appears grossly similar to prior.    BOWEL: within normal limits  PERITONEUM: no ascites or free air, no fluid collection.  RETROPERITONEUM: no enlarged retroperitoneal or pelvic nodes. No   retroperitoneal or obvious soft tissue hemorrhage.    VESSELS: IVC filter.  ABDOMINAL WALL: within normal limits.  MUSCULOSKELETAL: Right hip arthroplasty. T12 vertebral compression   deformity.    IMPRESSION:     No retroperitoneal hemorrhage. Other findings as above necrosis stable   .      < end of copied text >        < from: US Testicles (17 @ 16:01) >    EXAM:  US SCROTUM AND CONTENTS                            PROCEDURE DATE:  2017          INTERPRETATION:      Ultrasound of the testes and scrotum         CLINICAL INFORMATION:      Scrotal ecchymosis    TECHNIQUE:   Transcutaneous sonographywas performed.  Doppler color and   spectral techniques were employed as part of this exam to evaluate   vascular structures of each hemiscrotum with regard to patency and flow   characteristics.    FINDINGS:  No comparison studies are available for review.      The right testis measures 3.8 x 1.8 x 2.0 cm.  There is intact Doppler   flow within testicular parenchyma.  No focal lesion is found.  Testicular   contours remain smooth.  The right epididymal head measures 1.0 cm and   appears unremarkable.  There is a physiologic amount of fluid in the   right hemiscrotum.         The left testis measures 3.0 x 1.6 x 2.6 cm.  There is intact Doppler   flow within testicular parenchyma.  No focal lesion is found. A 7 mm   scrotal calcification is seen, a benign finding Testicular contours   remain smooth.  The left epididymal head measures 1.1 cm and appears   unremarkable.  There is a physiologic amount of fluid in the left   hemiscrotum.      Moderate scrotal edema is noted.      IMPRESSION: Moderate scrotal edema.        1.   Right hemiscrotum:  Unremarkable.        2.   Left hemiscrotum:  Unremarkable.         < end of copied text >        Advanced directive addressed: full resuscitation

## 2017-12-31 NOTE — PROGRESS NOTE ADULT - NSHPATTENDINGPLANDISCUSS_GEN_ALL_CORE
Residents /  Dr. Vegas /  Dr. parker
Residents /  Dr. Vegas /  Dr. parker
Residents /  Dr. Vegas /  Dr. Arjun Bishop

## 2017-12-31 NOTE — PROGRESS NOTE ADULT - ASSESSMENT
82 with a history of CKD, CVA, dementia, HTN here with hypernatremia and PRABHU, renal evaluation. PRABHU multifactorial from dehydration. Obstructive uropathy playing a role as well.    PRABHU  -Renal function rising from obstruction, now improving with loredo in place, peak SCr 5.7, improving daily  - cont loredo  - >2L UOP over past 24 hours, monitor lytes daily - Mg, Phos, K, Ca - currently in range  -Urology for bilateral hydro / R hydroureter (seen on renal sono)  -Palliative for GOC noted  -Patient would not be a good candidate for HD, he would likely pull out any temp or tunneled catheter leading to greater harm. Son had agreed with this    Hypernatremia  - cont D5W -- today with slight rise due to post-obstructive diuresis - increase rate to 100ml/h  - add free water feeds 250ml QID (tolerating fluid and solids)    Anemia  -Management per medical team      d/c with resident  d/c with staff

## 2017-12-31 NOTE — PROGRESS NOTE ADULT - SUBJECTIVE AND OBJECTIVE BOX
COVERING FOR DR. BARR -.	    81 y/o M with a history of CKD, CVA, dementia, HTN here with hypernatremia and PRABHU, renal evaluation.      - s/p loredo placement, 2.2L output over past 24 hours, renal function improving   - appetite improving, +loredo with nonoliguric output, on D5W@50ml/h   - on IVF, nonoliguric UOP, renal function improving    REVIEW OF SYSTEMS:  UTO, dementia    MEDICATIONS  (STANDING):  cefTRIAXone Injectable 1000 milliGRAM(s) IV Push every 24 hours  dextrose 5%. 1000 milliLiter(s) (100 mL/Hr) IV Continuous <Continuous>  finasteride 5 milliGRAM(s) Oral daily  heparin  Injectable 5000 Unit(s) SubCutaneous every 12 hours  naphazoline/pheniramine Solution 1 Drop(s) Both EYES two times a day  polyethylene glycol 3350 17 Gram(s) Oral two times a day  tamsulosin 0.8 milliGRAM(s) Oral at bedtime      Vital Signs Last 24 Hrs  T(C): 37.1 (31 Dec 2017 05:26), Max: 37.8 (30 Dec 2017 11:29)  T(F): 98.8 (31 Dec 2017 05:26), Max: 100 (30 Dec 2017 11:29)  HR: 69 (31 Dec 2017 05:26) (68 - 75)  BP: 142/72 (31 Dec 2017 05:26) (139/- - 148/71)  BP(mean): --  RR: 18 (31 Dec 2017 05:26) (16 - 18)  SpO2: 97% (31 Dec 2017 05:26) (96% - 100%)  Daily     Daily   I&O's Summary    30 Dec 2017 07:  -  31 Dec 2017 07:00  --------------------------------------------------------  IN: 1902 mL / OUT: 1925 mL / NET: -23 mL    29 Dec 2017 07:  -  30 Dec 2017 07:00  --------------------------------------------------------  IN: 990 mL / OUT: 2500 mL / NET: -1510 mL    28 Dec 2017 07:  -  29 Dec 2017 07:00  --------------------------------------------------------  IN: 784 mL / OUT: 2275 mL / NET: -1491 mL    29 Dec 2017 07:  -  29 Dec 2017 15:28  --------------------------------------------------------  IN: 990 mL / OUT: 1200 mL / NET: -210 mL          PHYSICAL EXAM:    Constitutional: NAD, sleeping comfortably  HEENT: PERRLA, EOMI,  MMM  Neck: No LAD, No JVD  Respiratory: CTAB, no wheeze  Cardiovascular: S1 and S2, RRR  Gastrointestinal: BS+, soft, NT/ND  Extremities: No peripheral edema  Neurological: comfortable, moving all extremities  :  +Loredo with >1L light yellow urine output  Skin: No rashes  Access: Not applicable        LABS:                                    9.5    8.9   )-----------( 109      ( 29 Dec 2017 07:21 )             28.8                  148<H>  |  114<H>  |  51<H>  ----------------------------<  122<H>  4.5   |  25  |  3.48<H>    Ca    8.9      31 Dec 2017 09:40  Phos  3.5       Mg     2.3         146<H>  |  116<H>  |  59<H>  ----------------------------<  126<H>  3.7   |  21<L>  |  4.13<H>    Ca    8.7      30 Dec 2017 07:33           12-    143  |  114<H>  |  69<H>  ----------------------------<  141<H>  4.1   |  22  |  5.04<H>    Ca    8.1<L>      29 Dec 2017 07:21      28 Dec 2017 07:34    145    |  114    |  73     ----------------------------<  140    4.3     |  20     |  5.73   27 Dec 2017 06:48    148    |  117    |  64     ----------------------------<  185    4.3     |  24     |  5.06   25 Dec 2017 18:15    155    |  124    |  59     ----------------------------<  107    4.4     |  22     |  3.79   25 Dec 2017 08:30    158    |  126    |  65     ----------------------------<  113    4.7     |  25     |  3.93   25 Dec 2017 06:00    158    |  125    |  67     ----------------------------<  121    4.7     |  25     |  3.95     Ca    8.1        28 Dec 2017 07:34  Ca    8.4        27 Dec 2017 06:48  Ca    8.3        25 Dec 2017 18:15  Ca    8.4        25 Dec 2017 08:30  Ca    8.3        25 Dec 2017 06:00  Phos  2.4       27 Dec 2017 06:48    TPro  6.8    /  Alb  2.9    /  TBili  0.5    /  DBili  x      /  AST  13     /  ALT  18     /  AlkPhos  97     24 Dec 2017 22:29          Urine Studies:  Urinalysis Basic - ( 27 Dec 2017 02:40 )    Color: Red / Appearance: Slightly Turbid / S.010 / pH: x  Gluc: x / Ketone: Negative  / Bili: Negative / Urobili: Negative mg/dL   Blood: x / Protein: 100 mg/dL / Nitrite: Negative   Leuk Esterase: Moderate / RBC: >50 /HPF / WBC 6-10   Sq Epi: x / Non Sq Epi: Occasional / Bacteria: Many            RADIOLOGY & ADDITIONAL STUDIES:    < from: US Kidney and Bladder (17 @ 08:36) >  EXAM:  US KIDNEYS AND BLADDER                            PROCEDURE DATE:  2017          INTERPRETATION:      Ultrasound of the kidneys         CLINICAL INFORMATION:    Hydronephrosis seen on CT scan    TECHNIQUE:  Transabdominal sonography was performed.    FINDINGS:   CT dated 2017 available for review.    The right kidney measures 8.0 cm in length. Its contours are smooth.  No   focal lesion is found.  No calculi are seen. Moderate hydronephrosis and   proximal hydroureter is present.     The left kidney measures 9.8 cm in length. Its contours are smooth.  No   focal lesion is found.  No calculi are seen. Moderate hydronephrosis is   present.    The abdominal aorta measures normal caliber anterior to posterior.  The   IVC and retroperitoneal structures appear intact. Loredo catheter is seen   within the bladder.      IMPRESSION: Moderate BILATERAL hydronephrosis and proximal RIGHT   hydroureter noted.    < end of copied text >

## 2017-12-31 NOTE — PROGRESS NOTE ADULT - PROBLEM SELECTOR PLAN 3
-Fever secondary to the Urinary tract infection  Unchanged, started overnight 12/26 - 12/27. Bcx from 12/27 growing GNRs w/ proteus.   -s/p zosyn now ceftriaxone  Continue tylenol PRN fevers  f/u Ucx-negative  -follow up with the Blood cultures repeat  -ID consult appreciated

## 2017-12-31 NOTE — PROGRESS NOTE ADULT - SUBJECTIVE AND OBJECTIVE BOX
SUBJECTIVE: 82M w/ PMH of HTN, dementia, MDD, BPH was BIBA from [facility] for decreasing kidney function (BUN/Cr = 74/4.10). Pt's baseline mental status is disoriented and combative. As per [facility] staff/documentation - previous BUN/Cr was 46/1.82. Renal sonogram 11/18/17 showed questionable hydronephrosis. Could not provide the reason behind the sonogram work up.  12/12 BUN/cr = 60/3.67. 12/21 BUN/cr = 73/3.87; at which point he was started on IVF hydration. On 12/22 BUN/Cr worse at 81/4.29, then 12/23 BUN/Cr 74/4.10 and had Chance inserted that day 12/23/17.   Pt is on medication for BPH only because his family is refusing medication for tx of his other diagnoses.    Pt is full code per accompanying MOLST form.    12/30/17: Pt seen and examined at bedside. Pt is AOx1 was resting comfortable. Mittens in place as per the nurse mildly agitated added haldol 0.5 mg PO prn. Creatinine trending down. D/w with the patient son regarding the update about his father condition.    12/31/17 Pt seen and examined at bedside.  No overnight events.  patient appears comfortable.  Dr. La discussed the patient's care via phone with the patient's son.    ROS  Unable to assess    Vital Signs Last 24 Hrs  T(C): 37.1 (31 Dec 2017 05:26), Max: 37.6 (31 Dec 2017 03:07)  T(F): 98.8 (31 Dec 2017 05:26), Max: 99.7 (31 Dec 2017 03:07)  HR: 69 (31 Dec 2017 05:26) (68 - 71)  BP: 142/72 (31 Dec 2017 05:26) (142/72 - 148/71)  BP(mean): --  RR: 18 (31 Dec 2017 05:26) (18 - 18)  SpO2: 97% (31 Dec 2017 05:26) (97% - 100%)    I&O's Summary    30 Dec 2017 07:01  -  31 Dec 2017 07:00  --------------------------------------------------------  IN: 1902 mL / OUT: 1925 mL / NET: -23 mL    31 Dec 2017 07:01  -  31 Dec 2017 15:23  --------------------------------------------------------  IN: 0 mL / OUT: 425 mL / NET: -425 mL        CAPILLARY BLOOD GLUCOSE    PHYSICAL EXAM:    Constitutional: NAD, awake and alert, well-developed  HEENT: PERR, EOMI, Normal Hearing, MMM  Neck: Soft and supple, No LAD, No JVD  Respiratory: Breath sounds are clear bilaterally, No wheezing, rales or rhonchi  Cardiovascular: S1 and S2, regular rate and rhythm, no Murmurs, gallops or rubs  Gastrointestinal: +umbilical hernia.  Bowel Sounds present, soft, nontender, nondistended, no guarding, no rebound  Extremities: No peripheral edema  Vascular: 2+ peripheral pulses  Musculoskeletal: joints normal ROM.  Skin: No rashes    MEDICATIONS:  MEDICATIONS  (STANDING):  cefTRIAXone Injectable 1000 milliGRAM(s) IV Push every 24 hours  dextrose 5%. 1000 milliLiter(s) (100 mL/Hr) IV Continuous <Continuous>  finasteride 5 milliGRAM(s) Oral daily  heparin  Injectable 5000 Unit(s) SubCutaneous every 12 hours  naphazoline/pheniramine Solution 1 Drop(s) Both EYES two times a day  polyethylene glycol 3350 17 Gram(s) Oral two times a day  tamsulosin 0.8 milliGRAM(s) Oral at bedtime      LABS: All Labs Reviewed:                        9.8    7.1   )-----------( 126      ( 30 Dec 2017 07:33 )             29.8     12-31    148<H>  |  114<H>  |  51<H>  ----------------------------<  122<H>  4.5   |  25  |  3.48<H>    Ca    8.9      31 Dec 2017 09:40  Phos  3.5     12-31  Mg     2.3     12-31            Blood Culture: 12-28 @ 12:00  Organism --  Gram Stain Blood -- Gram Stain --  Specimen Source .Urine Catheterized  Culture-Blood --    12-27 @ 06:48  Organism Proteus mirabilis  Gram Stain Blood -- Gram Stain   Growth in aerobic bottle: Gram Negative Rods  Specimen Source .Blood None  Culture-Blood --    12-27 @ 02:27  Organism --  Gram Stain Blood -- Gram Stain --  Specimen Source .Blood None  Culture-Blood --        RADIOLOGY/EKG:    DVT PPX:    ADVANCED DIRECTIVE:    DISPOSITION:

## 2017-12-31 NOTE — PROGRESS NOTE ADULT - PROBLEM SELECTOR PLAN 2
-Stable  -New onset as of 12/27-12/28. s/p 2u pRBC on 12/28 with appropriate increase to 9.5/28.8 on 12/29/17.  -Continue to trend CBC  -CT abd/pel performed 12/28 did not show source of bleeding

## 2017-12-31 NOTE — PROGRESS NOTE ADULT - PROBLEM SELECTOR PLAN 1
- acute on Chronic kidney disease stage IV   - Likely obstructive cause given pt's history of BPH - now with loredo / requiring mittents since patient is pulling on the loredo  - Urology consulted, placed coude catheter w/ return of clear urine-patient removed the cath 12/26  - continue with IV fluids  - continue tamsulosin  - nephrology following - Dr. Arjun Bishop  - care discussed with Dr. Vegas this am, will come and evaluate the patient  - CT and the US of the abdomen and pelvis reviewed  - Cr is improving

## 2017-12-31 NOTE — PROGRESS NOTE ADULT - PROBLEM SELECTOR PLAN 1
-PRABHU on CKD stage 5  -Creatinine improving-Postobstructive Uropathy  -Chance back in place, placed by Dr. La.    Likely obstructive cause given pt's history of BPH, present on admission. Unclear if patient has CKD because we don't know his baseline Cr level.  -Urology consulted, placed coude that patient then removed (combative/dementia). Re-consulted, recommending palliative care consult due to likelihood of patient removing any catheter that is placed.  -Monitor H&H given bleeding from previous catheter placement  -Continue IV hydration   -Continue tamsulosin  -f/u nephrology consult -Postobstructive uropathy- Not good HD candidate given dementia and tendency to pull out catheters. HD is not indicated at this time unless kidney function continues to deteriorate.

## 2018-01-01 LAB
ANION GAP SERPL CALC-SCNC: 7 MMOL/L — SIGNIFICANT CHANGE UP (ref 5–17)
BUN SERPL-MCNC: 42 MG/DL — HIGH (ref 7–23)
CALCIUM SERPL-MCNC: 8.6 MG/DL — SIGNIFICANT CHANGE UP (ref 8.5–10.1)
CHLORIDE SERPL-SCNC: 109 MMOL/L — HIGH (ref 96–108)
CO2 SERPL-SCNC: 25 MMOL/L — SIGNIFICANT CHANGE UP (ref 22–31)
CREAT SERPL-MCNC: 2.8 MG/DL — HIGH (ref 0.5–1.3)
CULTURE RESULTS: SIGNIFICANT CHANGE UP
GLUCOSE SERPL-MCNC: 126 MG/DL — HIGH (ref 70–99)
HCT VFR BLD CALC: 31.3 % — LOW (ref 39–50)
HGB BLD-MCNC: 10 G/DL — LOW (ref 13–17)
MCHC RBC-ENTMCNC: 28.9 PG — SIGNIFICANT CHANGE UP (ref 27–34)
MCHC RBC-ENTMCNC: 32.1 GM/DL — SIGNIFICANT CHANGE UP (ref 32–36)
MCV RBC AUTO: 90.2 FL — SIGNIFICANT CHANGE UP (ref 80–100)
PLATELET # BLD AUTO: 171 K/UL — SIGNIFICANT CHANGE UP (ref 150–400)
POTASSIUM SERPL-MCNC: 3.9 MMOL/L — SIGNIFICANT CHANGE UP (ref 3.5–5.3)
POTASSIUM SERPL-SCNC: 3.9 MMOL/L — SIGNIFICANT CHANGE UP (ref 3.5–5.3)
RBC # BLD: 3.47 M/UL — LOW (ref 4.2–5.8)
RBC # FLD: 12.7 % — SIGNIFICANT CHANGE UP (ref 10.3–14.5)
SODIUM SERPL-SCNC: 141 MMOL/L — SIGNIFICANT CHANGE UP (ref 135–145)
SPECIMEN SOURCE: SIGNIFICANT CHANGE UP
WBC # BLD: 7.7 K/UL — SIGNIFICANT CHANGE UP (ref 3.8–10.5)
WBC # FLD AUTO: 7.7 K/UL — SIGNIFICANT CHANGE UP (ref 3.8–10.5)

## 2018-01-01 RX ORDER — ONDANSETRON 8 MG/1
4 TABLET, FILM COATED ORAL EVERY 4 HOURS
Qty: 0 | Refills: 0 | Status: DISCONTINUED | OUTPATIENT
Start: 2018-01-01 | End: 2018-01-03

## 2018-01-01 RX ORDER — PANTOPRAZOLE SODIUM 20 MG/1
40 TABLET, DELAYED RELEASE ORAL DAILY
Qty: 0 | Refills: 0 | Status: DISCONTINUED | OUTPATIENT
Start: 2018-01-01 | End: 2018-01-03

## 2018-01-01 RX ORDER — ONDANSETRON 8 MG/1
4 TABLET, FILM COATED ORAL ONCE
Qty: 0 | Refills: 0 | Status: COMPLETED | OUTPATIENT
Start: 2018-01-01 | End: 2018-01-01

## 2018-01-01 RX ORDER — SODIUM CHLORIDE 9 MG/ML
1000 INJECTION INTRAMUSCULAR; INTRAVENOUS; SUBCUTANEOUS
Qty: 0 | Refills: 0 | Status: DISCONTINUED | OUTPATIENT
Start: 2018-01-01 | End: 2018-01-02

## 2018-01-01 RX ORDER — CEFUROXIME AXETIL 250 MG
250 TABLET ORAL EVERY 12 HOURS
Qty: 0 | Refills: 0 | Status: DISCONTINUED | OUTPATIENT
Start: 2018-01-02 | End: 2018-01-03

## 2018-01-01 RX ADMIN — SODIUM CHLORIDE 75 MILLILITER(S): 9 INJECTION INTRAMUSCULAR; INTRAVENOUS; SUBCUTANEOUS at 11:06

## 2018-01-01 RX ADMIN — POLYETHYLENE GLYCOL 3350 17 GRAM(S): 17 POWDER, FOR SOLUTION ORAL at 07:19

## 2018-01-01 RX ADMIN — HEPARIN SODIUM 5000 UNIT(S): 5000 INJECTION INTRAVENOUS; SUBCUTANEOUS at 07:19

## 2018-01-01 RX ADMIN — SODIUM CHLORIDE 100 MILLILITER(S): 9 INJECTION, SOLUTION INTRAVENOUS at 03:29

## 2018-01-01 RX ADMIN — FINASTERIDE 5 MILLIGRAM(S): 5 TABLET, FILM COATED ORAL at 12:17

## 2018-01-01 RX ADMIN — Medication 1 DROP(S): at 07:22

## 2018-01-01 RX ADMIN — Medication 1 DROP(S): at 17:24

## 2018-01-01 RX ADMIN — CEFTRIAXONE 1000 MILLIGRAM(S): 500 INJECTION, POWDER, FOR SOLUTION INTRAMUSCULAR; INTRAVENOUS at 17:24

## 2018-01-01 RX ADMIN — HEPARIN SODIUM 5000 UNIT(S): 5000 INJECTION INTRAVENOUS; SUBCUTANEOUS at 17:25

## 2018-01-01 RX ADMIN — TAMSULOSIN HYDROCHLORIDE 0.8 MILLIGRAM(S): 0.4 CAPSULE ORAL at 21:32

## 2018-01-01 RX ADMIN — ONDANSETRON 4 MILLIGRAM(S): 8 TABLET, FILM COATED ORAL at 17:57

## 2018-01-01 NOTE — DIETITIAN INITIAL EVALUATION ADULT. - PROBLEM SELECTOR PLAN 5
- Currently on no meds for dementia and/or associated behavioral disturbance, although full code on MOLST  - Discuss w/ family re: starting treatment  - 1mg Haldol stat to aid in work up as pt currently combative  - 1mg Ativan stat for procedural sedation

## 2018-01-01 NOTE — DIETITIAN INITIAL EVALUATION ADULT. - FACTORS AFF FOOD INTAKE
change in mental status/difficulty feeding self/Dementia/difficulty chewing/difficulty swallowing/other (specify)

## 2018-01-01 NOTE — DIETITIAN INITIAL EVALUATION ADULT. - OTHER INFO
Nutrition assessment for length of stay. Nutrition assessment for length of stay. DX: ARF with history of dementia, and depression. Pt non verbal and unable to provide wt/po history PTA. Current diet rx mechanical soft tolerating well with total assistance (~% of meals consumed as per 1:1 aide). Skin: intact with no edema documented. NFPE indicates moderate muscle wasting: Temporal, Scapula, patella, and calves. Moderate fat loss: tricep, ocular, and ribs. Pt meets criteria for severe protein/calorie malnutrition in context of acute illness secondary to moderate fat/muscle wasting. Recommendations: 1) Ensure enlive 8oz. po QD 2) Continue with total assistance during meals for optimal intake.

## 2018-01-01 NOTE — DIETITIAN INITIAL EVALUATION ADULT. - PROBLEM SELECTOR PLAN 1
Progressive worsening of Acute renal failure /Obstructive uropathy/ with gross hematuria/ hx of BPH  - Admit to med surg  - IVF  -3 way loredo with CBI  - Ct abd/pelvis  w/o con/(unable to do CT urogram  with Iv con due to PRABHU)  - monitor I/O  -urology consult  -Nephrology consult  - strict i/o  - Avoid all nephro toxic drugs  - Start [id number] to decrease serum sodium levels  - Nephro consult  - CXR ordered- pending

## 2018-01-01 NOTE — PROGRESS NOTE ADULT - PROBLEM SELECTOR PLAN 1
-PRABHU on CKD stage 5  -Creatinine improving-Postobstructive Uropathy  -Chance back in place, placed by Dr. La.    Likely obstructive cause given pt's history of BPH, present on admission. Unclear if patient has CKD because we don't know his baseline Cr level.  -Urology consulted, placed coude that patient then removed (combative/dementia). Re-consulted, recommending palliative care consult due to likelihood of patient removing any catheter that is placed.  -Monitor H&H given bleeding from previous catheter placement  -Continue IV hydration   -Continue tamsulosin  -f/u nephrology consult -Postobstructive uropathy- Not good HD candidate given dementia and tendency to pull out catheters. HD is not indicated at this time unless kidney function continues to deteriorate.  -slowly improving.  continue to monitor.  -continue IVF

## 2018-01-01 NOTE — PROGRESS NOTE ADULT - SUBJECTIVE AND OBJECTIVE BOX
HPI:  83yo M w/PMHx of HTN, Dementia, Major Depressive  Disorder, BPH, admitted on  for evaluation of altered mental status, agitation. The patient has been having fever since admission, had loredo catheter placed on admission, however, this was traumatically removed with subsequent swelling of scrotum. Patient intermittently combative, unable to obtain history and history per medical record. Labwork within the last 24 hours shows significant drop in hgb/hct of unclear etiology.  Today  patient sleeping, is afebrile, but had fever to 101 overnite  Today  patient sleeping, fevers range   Today  patient sleeping, afebrile  Today  patient awake, afebrile greater than 48 hours      MEDICATIONS  (STANDING):  cefTRIAXone Injectable 1000 milliGRAM(s) IV Push every 24 hours  finasteride 5 milliGRAM(s) Oral daily  heparin  Injectable 5000 Unit(s) SubCutaneous every 12 hours  naphazoline/pheniramine Solution 1 Drop(s) Both EYES two times a day  polyethylene glycol 3350 17 Gram(s) Oral two times a day  sodium chloride 0.9%. 1000 milliLiter(s) (75 mL/Hr) IV Continuous <Continuous>  tamsulosin 0.8 milliGRAM(s) Oral at bedtime    MEDICATIONS  (PRN):  acetaminophen   Tablet. 650 milliGRAM(s) Oral every 6 hours PRN Moderate Pain (4 - 6) and fever >101F  acetaminophen  Suppository 650 milliGRAM(s) Rectal every 6 hours PRN For Temp greater than 38 C (100.4 F)  haloperidol     Tablet 0.5 milliGRAM(s) Oral every 12 hours PRN agitation      Vital Signs Last 24 Hrs  T(C): 36.6 (2018 11:38), Max: 37.2 (31 Dec 2017 21:00)  T(F): 97.8 (2018 11:38), Max: 99 (31 Dec 2017 21:00)  HR: 70 (2018 11:38) (64 - 71)  BP: 117/51 (2018 11:38) (105/84 - 117/51)  BP(mean): --  RR: 16 (2018 11:38) (16 - 18)  SpO2: 97% (2018 11:38) (97% - 100%)    Physical Exam:            Daily     Daily   Constitutional: frail looking  HEENT: NC/AT, EOMI, PERRLA  Neck: supple  Respiratory: clear, no r/r/w, though somnolent poor inspiratory effort  Cardiovascular: S1S2 regular, no murmurs  Abdomen: soft, not tender, not distended, positive BS  Genitourinary: loredo catheter in place, edematous scrotum  Rectal: deferred  Musculoskeletal: no muscle tenderness, no joint swelling or tenderness  Neurological: somnolent, moving all extremities, no focal deficits  Skin: no rashes      Labs:           Labs:                        9.8    7.1   )-----------( 126      ( 30 Dec 2017 07:33 )             29.8         146<H>  |  116<H>  |  59<H>  ----------------------------<  126<H>  3.7   |  21<L>  |  4.13<H>    Ca    8.7      30 Dec 2017 07:33  Mg     2.4         Labs:                        9.8    7.1   )-----------( 126      ( 30 Dec 2017 07:33 )             29.8         148<H>  |  114<H>  |  51<H>  ----------------------------<  122<H>  4.5   |  25  |  3.48<H>    Ca    8.9      31 Dec 2017 09:40  Phos  3.5       Mg     2.3                  Cultures:       Culture - Urine (collected 17 @ 12:00)  Source: .Urine Catheterized  Final Report (17 @ 22:44):    <10,000 CFU/ml    Normal Urogenital fredrick present    Culture - Blood (collected 17 @ 06:48)  Source: .Blood None  Gram Stain (17 @ 17:16):    Growth in aerobic bottle: Gram Negative Rods  Final Report (17 @ 18:36):    Growth in aerobic bottle: Proteus mirabilis    ***Blood Panel PCR results on this specimen are available    approximately 3 hours after the Gram stain result.***    Gram stain, PCR, and/or culture results may not always    correspond due to difference in methodologies.    ************************************************************    This PCR assay was performed using [facility].    The following targets are tested for: Enterococcus,    vancomycin resistant enterococci, Listeria monocytogenes,    coagulase negative staphylococci, S. aureus,    methicillin resistant S. aureus, Streptococcus agalactiae    (Group B), S. pneumoniae, S. pyogenes (Group A),    Acinetobacter baumannii, Enterobacter cloacae, E. coli,    Klebsiella oxytoca, K. pneumoniae, Proteus sp.,    Serratia marcescens, Haemophilus influenzae,    Neisseria meningitidis, Pseudomonas aeruginosa, Candida    albicans, C. glabrata, C krusei, C parapsilosis,    C. tropicalis and the KPC resistance gene.  Organism: Proteus mirabilis  Blood Culture PCR (17 @ 18:36)  Organism: Blood Culture PCR (17 @ 18:36)      -  Proteus species: Detec      Method Type: PCR  Organism: Proteus mirabilis (17 @ 18:36)      -  Amikacin: S <=8      -  Ampicillin: S <=2      -  Ampicillin/Sulbactam: S <=4/2      -  Aztreonam: S <=4      -  Cefazolin: S <=2      -  Cefepime: S <=2      -  Cefoxitin: S <=4      -  Ceftazidime: S <=1      -  Ceftriaxone: S <=1      -  Ciprofloxacin: R >2      -  Ertapenem: S <=0.5      -  Gentamicin: S <=1      -  Levofloxacin: R >4      -  Meropenem: S <=1      -  Piperacillin/Tazobactam: S <=8      -  Tobramycin: S <=2      -  Trimethoprim/Sulfamethoxazole: S       Method Type: BANG    Culture - Blood (collected 17 @ 02:27)  Source: .Blood None  Preliminary Report (17 @ 10:01):    No growth to date.    Culture - Urine (collected 17 @ 23:27)  Source: .Urine Clean Catch (Midstream)  Final Report (17 @ 11:19):    No growth               Culture - Blood (collected 17 @ 02:27)  Source: .Blood None  Preliminary Report (17 @ 10:01):    No growth to date.    Culture - Urine (collected 17 @ 23:27)  Source: .Urine Clean Catch (Midstream)  Final Report (17 @ 11:19):    No growth                           9.5    8.9   )-----------( 109      ( 29 Dec 2017 07:21 )             28.8         143  |  114<H>  |  69<H>  ----------------------------<  141<H>  4.1   |  22  |  5.04<H>    Ca    8.1<L>      29 Dec 2017 07:21                                    6.7    8.5   )-----------( 77       ( 28 Dec 2017 07:34 )             21.3         145  |  114<H>  |  73<H>  ----------------------------<  140<H>  4.3   |  20<L>  |  5.73<H>    Ca    8.1<L>      28 Dec 2017 07:34  Phos  2.4              Urinalysis Basic - ( 27 Dec 2017 02:40 )    Color: Red / Appearance: Slightly Turbid / S.010 / pH: x  Gluc: x / Ketone: Negative  / Bili: Negative / Urobili: Negative mg/dL   Blood: x / Protein: 100 mg/dL / Nitrite: Negative   Leuk Esterase: Moderate / RBC: >50 /HPF / WBC 6-10   Sq Epi: x / Non Sq Epi: Occasional / Bacteria: Many    Culture - Urine (17 @ 23:27)    Specimen Source: .Urine Clean Catch (Midstream)    Culture Results:   No growth      Culture - Blood (17 @ 06:48)    Specimen Source: .Blood None    Culture Results:   No growth to date.    Culture - Blood (17 @ 02:27)    Specimen Source: .Blood None    Culture Results:   No growth to date.              Radiology:< from: CT Abdomen and Pelvis No Cont (17 @ 12:24) >    EXAM:  CT ABDOMEN AND PELVIS                            PROCEDURE DATE:  2017          INTERPRETATION:  Clinical information: Troponin hemoglobin, assess for   retroperitoneal hemorrhage    Comparison:     PROCEDURE:     CT of the Abdomen and Pelvis was performed without intravenous contrast.    Evaluation of abdominal and pelvic viscera, lymph nodes and vasculature   is limited without intravenous contrast.    FINDINGS:    LOWER CHEST: Trace effusions and atelectasis    ABDOMEN:  LIVER: within normal limits  BILE DUCTS: normal caliber  GALLBLADDER: no calcified gallstones. normal caliber wall  PANCREAS: within normal limits  SPLEEN: within normal limits  ADRENALS: within normal limits  KIDNEYS: Moderate bilateral hydronephrosis,grossly stable.    PELVIS:  REPRODUCTIVE ORGANS: no pelvic masses  BLADDER: Thickened, contracted around Loredo catheter. Perivesical Fat   stranding, appears grossly similar to prior.    BOWEL: within normal limits  PERITONEUM: no ascites or free air, no fluid collection.  RETROPERITONEUM: no enlarged retroperitoneal or pelvic nodes. No   retroperitoneal or obvious soft tissue hemorrhage.    VESSELS: IVC filter.  ABDOMINAL WALL: within normal limits.  MUSCULOSKELETAL: Right hip arthroplasty. T12 vertebral compression   deformity.    IMPRESSION:     No retroperitoneal hemorrhage. Other findings as above necrosis stable   .      < end of copied text >        < from: US Testicles (17 @ 16:01) >    EXAM:  US SCROTUM AND CONTENTS                            PROCEDURE DATE:  2017          INTERPRETATION:      Ultrasound of the testes and scrotum         CLINICAL INFORMATION:      Scrotal ecchymosis    TECHNIQUE:   Transcutaneous sonographywas performed.  Doppler color and   spectral techniques were employed as part of this exam to evaluate   vascular structures of each hemiscrotum with regard to patency and flow   characteristics.    FINDINGS:  No comparison studies are available for review.      The right testis measures 3.8 x 1.8 x 2.0 cm.  There is intact Doppler   flow within testicular parenchyma.  No focal lesion is found.  Testicular   contours remain smooth.  The right epididymal head measures 1.0 cm and   appears unremarkable.  There is a physiologic amount of fluid in the   right hemiscrotum.         The left testis measures 3.0 x 1.6 x 2.6 cm.  There is intact Doppler   flow within testicular parenchyma.  No focal lesion is found. A 7 mm   scrotal calcification is seen, a benign finding Testicular contours   remain smooth.  The left epididymal head measures 1.1 cm and appears   unremarkable.  There is a physiologic amount of fluid in the left   hemiscrotum.      Moderate scrotal edema is noted.      IMPRESSION: Moderate scrotal edema.        1.   Right hemiscrotum:  Unremarkable.        2.   Left hemiscrotum:  Unremarkable.         < end of copied text >        Advanced directive addressed: full resuscitation

## 2018-01-01 NOTE — DIETITIAN INITIAL EVALUATION ADULT. - NS AS NUTRI DX NUTRIENT
Malnutrition/Pt meets criteria for severe protein/calorie malnutrition in context of acute illness secondary to moderate fat/muscle wasting.

## 2018-01-01 NOTE — DIETITIAN INITIAL EVALUATION ADULT. - ENERGY NEEDS
Ht.    70  "        Wt.   147 #              BMI 21.1                 IBW  157  #               Pt is at  94  %  IBW

## 2018-01-01 NOTE — PROVIDER CONTACT NOTE (OTHER) - DATE AND TIME:
28-Dec-2017 08:48
01-Jan-2018 10:00
25-Dec-2017 12:08
25-Dec-2017 18:22
25-Dec-2017 22:26
27-Dec-2017 15:56

## 2018-01-01 NOTE — PROGRESS NOTE ADULT - SUBJECTIVE AND OBJECTIVE BOX
SUBJECTIVE: 82M w/ PMH of HTN, dementia, MDD, BPH was BIBA from [facility] for decreasing kidney function (BUN/Cr = 74/4.10). Pt's baseline mental status is disoriented and combative. As per [facility] staff/documentation - previous BUN/Cr was 46/1.82. Renal sonogram 11/18/17 showed questionable hydronephrosis. Could not provide the reason behind the sonogram work up.  12/12 BUN/cr = 60/3.67. 12/21 BUN/cr = 73/3.87; at which point he was started on IVF hydration. On 12/22 BUN/Cr worse at 81/4.29, then 12/23 BUN/Cr 74/4.10 and had Chance inserted that day 12/23/17.   Pt is on medication for BPH only because his family is refusing medication for tx of his other diagnoses.    Pt is full code per accompanying MOLST form.    12/30/17: Pt seen and examined at bedside. Pt is AOx1 was resting comfortable. Mittens in place as per the nurse mildly agitated added haldol 0.5 mg PO prn. Creatinine trending down. D/w with the patient son regarding the update about his father condition.    12/31/17 Pt seen and examined at bedside.  No overnight events.  patient appears comfortable.  Dr. La discussed the patient's care via phone with the patient's son.    1/1/18: Pt seen and examined at bedside.  mittens in place.  in no acute distress and appears comfortable.     ROS  Unable to assess    Vital Signs Last 24 Hrs  T(C): 36.7 (01 Jan 2018 06:00), Max: 37.2 (31 Dec 2017 21:00)  T(F): 98.1 (01 Jan 2018 06:00), Max: 99 (31 Dec 2017 21:00)  HR: 64 (01 Jan 2018 06:00) (64 - 71)  BP: 111/56 (01 Jan 2018 06:00) (105/84 - 111/56)  BP(mean): --  RR: 17 (01 Jan 2018 06:00) (17 - 18)  SpO2: 100% (01 Jan 2018 06:00) (97% - 100%)    I&O's Summary    31 Dec 2017 07:01  -  01 Jan 2018 07:00  --------------------------------------------------------  IN: 1875 mL / OUT: 2425 mL / NET: -550 mL    PHYSICAL EXAM:    Constitutional: NAD, awake and alert, well-developed  HEENT: PERR, EOMI, Normal Hearing, MMM  Neck: Soft and supple, No LAD, No JVD  Respiratory: Breath sounds are clear bilaterally, No wheezing, rales or rhonchi  Cardiovascular: S1 and S2, regular rate and rhythm, no Murmurs, gallops or rubs  Gastrointestinal: +umbilical hernia.  Bowel Sounds present, soft, nontender, nondistended, no guarding, no rebound  Extremities: No peripheral edema  Vascular: 2+ peripheral pulses  Musculoskeletal: joints normal ROM.  Skin: No rashes    MEDICATIONS:  MEDICATIONS  (STANDING):  cefTRIAXone Injectable 1000 milliGRAM(s) IV Push every 24 hours  dextrose 5%. 1000 milliLiter(s) (100 mL/Hr) IV Continuous <Continuous>  finasteride 5 milliGRAM(s) Oral daily  heparin  Injectable 5000 Unit(s) SubCutaneous every 12 hours  naphazoline/pheniramine Solution 1 Drop(s) Both EYES two times a day  polyethylene glycol 3350 17 Gram(s) Oral two times a day  tamsulosin 0.8 milliGRAM(s) Oral at bedtime      LABS: All Labs Reviewed:                        10.0   7.7   )-----------( 171      ( 01 Jan 2018 07:32 )             31.3     01-01    141  |  109<H>  |  42<H>  ----------------------------<  126<H>  3.9   |  25  |  2.80<H>    Ca    8.6      01 Jan 2018 07:32  Phos  3.5     12-31  Mg     2.3     12-31    Blood Culture: 12-30 @ 17:34  Organism --  Gram Stain Blood -- Gram Stain --  Specimen Source .Blood None  Culture-Blood --    12-28 @ 12:00  Organism --  Gram Stain Blood -- Gram Stain --  Specimen Source .Urine Catheterized  Culture-Blood -

## 2018-01-01 NOTE — PROVIDER CONTACT NOTE (OTHER) - REASON
Pt pulled Out coude catheter
hematuria
md galindo
md galindo
urine with pink tint and occasional clots
md galindo

## 2018-01-01 NOTE — CHART NOTE - NSCHARTNOTEFT_GEN_A_CORE
Upon Nutritional Assessment by the Registered Dietitian your patient was determined to meet criteria / has evidence of the following diagnosis/diagnoses:          [ ]  Mild Protein Calorie Malnutrition        [ ]  Moderate Protein Calorie Malnutrition        [x ] Severe Protein Calorie Malnutrition        [ ] Unspecified Protein Calorie Malnutrition        [ ] Underweight / BMI <19        [ ] Morbid Obesity / BMI > 40      Findings as based on:  •  Comprehensive nutrition assessment and consultation  •  Calorie counts (nutrient intake analysis)  •  Food acceptance and intake status from observations by staff  •  Follow up  •  Patient education  •  Intervention secondary to interdisciplinary rounds  •   concerns      Treatment:      1) Ensure enlive 8oz. po QD   2) Continue with total assistance during meals for optimal intake.    The following diet has been recommended:  Continue with Mechanical soft diet     PROVIDER Section:     By signing this assessment you are acknowledging and agree with the diagnosis/diagnoses assigned by the Registered Dietitian    Comments:

## 2018-01-01 NOTE — PROGRESS NOTE ADULT - ASSESSMENT
83yo M w/PMHx of HTN, Dementia, Major Depressive  Disorder, BPH, admitted on 12/24 for evaluation of altered mental status, agitation. The patient has been having fever since admission, had loredo catheter placed on admission, however, this was traumatically removed with subsequent swelling of scrotum. Patient intermittently combative, unable to obtain history and history per medical record. Labwork within the last 24 hours shows significant drop in hgb/hct of unclear etiology.  1. Patient with persistent fever, even while on zosyn; cystitis versus microaspiration?  - now found to have Proteus in blood cultures  -  ceftriaxone, day #5  - repeat blood cultures no growth  - will change to ceftin 250 mg po q 12 hours for 9 more days  - tolerating antibiotics without rashes or side effects   - iv hydration and supportive care   - serial cbc and monitor temperature   - hemoglobin seems to have stabilized  2. other issues; hypertension, dementia, bph  - per medicine  If further ID issues please reconsult

## 2018-01-01 NOTE — DIETITIAN INITIAL EVALUATION ADULT. - PROBLEM SELECTOR PLAN 2
- most likely 2/2 hematuria  - f/u H/H  - Transfuse if hemoglobin <8  -would consider PLT  transfusion if PLT <100 with further drop in h/h  - repeat CBC pending  - STAT type and screen  - currently hemodynamically stable

## 2018-01-01 NOTE — PROGRESS NOTE ADULT - PROBLEM SELECTOR PLAN 3
-Fever - currently afebrile -  secondary to the Urinary tract infection  Unchanged, started overnight 12/26 - 12/27. Bcx from 12/27 growing GNRs w/ proteus.   -s/p zosyn now ceftriaxone  Continue tylenol PRN fevers  f/u Ucx-negative  -follow up with the Blood cultures repeat  -ID consult appreciated

## 2018-01-02 LAB
ANION GAP SERPL CALC-SCNC: 7 MMOL/L — SIGNIFICANT CHANGE UP (ref 5–17)
BUN SERPL-MCNC: 45 MG/DL — HIGH (ref 7–23)
CALCIUM SERPL-MCNC: 9 MG/DL — SIGNIFICANT CHANGE UP (ref 8.5–10.1)
CHLORIDE SERPL-SCNC: 114 MMOL/L — HIGH (ref 96–108)
CO2 SERPL-SCNC: 25 MMOL/L — SIGNIFICANT CHANGE UP (ref 22–31)
CREAT SERPL-MCNC: 2.62 MG/DL — HIGH (ref 0.5–1.3)
GLUCOSE SERPL-MCNC: 111 MG/DL — HIGH (ref 70–99)
HCT VFR BLD CALC: 32 % — LOW (ref 39–50)
HGB BLD-MCNC: 10.4 G/DL — LOW (ref 13–17)
MCHC RBC-ENTMCNC: 29.3 PG — SIGNIFICANT CHANGE UP (ref 27–34)
MCHC RBC-ENTMCNC: 32.5 GM/DL — SIGNIFICANT CHANGE UP (ref 32–36)
MCV RBC AUTO: 90.2 FL — SIGNIFICANT CHANGE UP (ref 80–100)
PLATELET # BLD AUTO: 200 K/UL — SIGNIFICANT CHANGE UP (ref 150–400)
POTASSIUM SERPL-MCNC: 4.6 MMOL/L — SIGNIFICANT CHANGE UP (ref 3.5–5.3)
POTASSIUM SERPL-SCNC: 4.6 MMOL/L — SIGNIFICANT CHANGE UP (ref 3.5–5.3)
RBC # BLD: 3.54 M/UL — LOW (ref 4.2–5.8)
RBC # FLD: 12.8 % — SIGNIFICANT CHANGE UP (ref 10.3–14.5)
SODIUM SERPL-SCNC: 146 MMOL/L — HIGH (ref 135–145)
WBC # BLD: 9.6 K/UL — SIGNIFICANT CHANGE UP (ref 3.8–10.5)
WBC # FLD AUTO: 9.6 K/UL — SIGNIFICANT CHANGE UP (ref 3.8–10.5)

## 2018-01-02 RX ORDER — SODIUM CHLORIDE 9 MG/ML
1000 INJECTION, SOLUTION INTRAVENOUS
Qty: 0 | Refills: 0 | Status: DISCONTINUED | OUTPATIENT
Start: 2018-01-02 | End: 2018-01-03

## 2018-01-02 RX ADMIN — SODIUM CHLORIDE 75 MILLILITER(S): 9 INJECTION INTRAMUSCULAR; INTRAVENOUS; SUBCUTANEOUS at 00:29

## 2018-01-02 RX ADMIN — Medication 250 MILLIGRAM(S): at 17:12

## 2018-01-02 RX ADMIN — POLYETHYLENE GLYCOL 3350 17 GRAM(S): 17 POWDER, FOR SOLUTION ORAL at 05:24

## 2018-01-02 RX ADMIN — Medication 250 MILLIGRAM(S): at 05:24

## 2018-01-02 RX ADMIN — PANTOPRAZOLE SODIUM 40 MILLIGRAM(S): 20 TABLET, DELAYED RELEASE ORAL at 11:39

## 2018-01-02 RX ADMIN — FINASTERIDE 5 MILLIGRAM(S): 5 TABLET, FILM COATED ORAL at 11:40

## 2018-01-02 RX ADMIN — Medication 1 DROP(S): at 17:12

## 2018-01-02 RX ADMIN — HEPARIN SODIUM 5000 UNIT(S): 5000 INJECTION INTRAVENOUS; SUBCUTANEOUS at 17:12

## 2018-01-02 RX ADMIN — SODIUM CHLORIDE 70 MILLILITER(S): 9 INJECTION, SOLUTION INTRAVENOUS at 14:03

## 2018-01-02 RX ADMIN — HEPARIN SODIUM 5000 UNIT(S): 5000 INJECTION INTRAVENOUS; SUBCUTANEOUS at 05:24

## 2018-01-02 RX ADMIN — TAMSULOSIN HYDROCHLORIDE 0.8 MILLIGRAM(S): 0.4 CAPSULE ORAL at 22:38

## 2018-01-02 RX ADMIN — Medication 1 DROP(S): at 05:24

## 2018-01-02 NOTE — SWALLOW BEDSIDE ASSESSMENT ADULT - SWALLOW EVAL: DIAGNOSIS
1) The patient demonstrates periodically reduced orientation to feeding atop mild functional Oral Dysphagia(mildly prolonged/discontinuous oral processing) without overt pharyngeal dysfunction/aspiration signs. Oral Dysphagia is a functional condition providing that pt is alert/calm when feeding. Oral Dysphagia is felt to be chronic due to dementia and feedability/severity of Oral Dysphagia are apt to fluctuate with changes in alertness/mood/mentation. In any case, his oropharyngeal swallow integrity is still felt to be stable and I suspect that he is at oropharyngeal swallow baseline.  2) Communicative competence is reduced in setting of chronic Cognitive Dysfunction due to dementia. Cognitive deficits are apt to appear heightened when his routine is changed/he is acutely ill. His need must typically be anticipated.
1) The patient demonstrates periodically reduced orientation to feeding (confusion, agitation, "spitting behaviors) atop mild functional Oral Dysphagia(mildly prolonged/discontinuous/disorganized but adequate oral processing) without overt pharyngeal dysfunction/aspiration signs. Oral Dysphagia is a functional condition providing that pt is alert/calm when feeding. Oral Dysphagia is felt to be chronic due to dementia and feedability/severity of Oral Dysphagia are apt to fluctuate with changes in alertness/mood/mentation. In any case, his oropharyngeal swallow integrity is still felt to be stable and I suspect that he is at oropharyngeal swallow baseline.  2) Communicative competence is reduced in setting of chronic Cognitive Dysfunction due to dementia. Cognitive deficits are apt to appear heightened when his routine is changed/he is acutely ill. His need must typically be anticipated.

## 2018-01-02 NOTE — PROGRESS NOTE ADULT - SUBJECTIVE AND OBJECTIVE BOX
HPI: 81yo M w/PMHx of HTN, Dementia, Major Depressive  Disorder, BPH BIBA from [facility] w/BUN/cr of 74/4.10.  From chart review, nursing home paperwork, and speaking to nursing home staff, pt's baseline mental status is disoriented and combative. For this reason, I was not able to attain a history from pt. I called [facility] for collateral information.    SUBJECTIVE: Pt seen and examined at bedside. Pt does not follow commands. Poor historian. Unable to assess ROS.    REVIEW OF SYSTEMS:   Pt does not follow commands. Poor historian. Unable to assess ROS.    Vital Signs Last 24 Hrs  T(C): 36.7 (02 Jan 2018 11:43), Max: 37.1 (02 Jan 2018 05:18)  T(F): 98 (02 Jan 2018 11:43), Max: 98.8 (02 Jan 2018 05:18)  HR: 58 (02 Jan 2018 11:43) (58 - 82)  BP: 122/62 (02 Jan 2018 11:43) (122/62 - 135/74)  BP(mean): --  RR: 16 (02 Jan 2018 11:43) (16 - 19)  SpO2: 100% (02 Jan 2018 11:43) (96% - 100%)    I&O's Summary  01 Jan 2018 07:01  -  02 Jan 2018 07:00  --------------------------------------------------------  IN: 1578 mL / OUT: 2400 mL / NET: -822 mL    02 Jan 2018 07:01  -  02 Jan 2018 18:50  --------------------------------------------------------  IN: 942 mL / OUT: 350 mL / NET: 592 mL    PHYSICAL EXAM:  Constitutional: NAD, awake, well-developed  Respiratory: Breath sounds are clear bilaterally, No wheezing, rales or rhonchi  Cardiovascular: S1 and S2, regular rate and rhythm  Gastrointestinal: Bowel Sounds present, soft, nontender, nondistended, no guarding, no rebound  Extremities: No peripheral edema  Vascular: 2+ peripheral pulses  Neurological:  Does not respond to commands  Musculoskeletal: Does not respond to commands    MEDICATIONS  (STANDING):  cefuroxime   Tablet 250 milliGRAM(s) Oral every 12 hours  finasteride 5 milliGRAM(s) Oral daily  heparin  Injectable 5000 Unit(s) SubCutaneous every 12 hours  naphazoline/pheniramine Solution 1 Drop(s) Both EYES two times a day  pantoprazole  Injectable 40 milliGRAM(s) IV Push daily  polyethylene glycol 3350 17 Gram(s) Oral two times a day  sodium chloride 0.45%. 1000 milliLiter(s) (70 mL/Hr) IV Continuous <Continuous>  tamsulosin 0.8 milliGRAM(s) Oral at bedtime      LABS: All Labs Reviewed:                     10.4   9.6   )-----------( 200      ( 02 Jan 2018 06:38 )             32.0     01-02    146<H>  |  114<H>  |  45<H>  ----------------------------<  111<H>  4.6   |  25  |  2.62<H>    Ca    9.0      02 Jan 2018 06:38    Blood Culture: 12-30 @ 17:34  Organism --  Gram Stain Blood -- Gram Stain --  Specimen Source .Blood None  Culture-Blood --    RADIOLOGY/EKG:  US Kidney and Bladder (12.29.17 @ 08:36)   IMPRESSION: Moderate BILATERAL hydronephrosis and proximal RIGHT   hydroureter noted.

## 2018-01-02 NOTE — SWALLOW BEDSIDE ASSESSMENT ADULT - SWALLOW EVAL: SECRETION MANAGEMENT
Limited probed revealed that no drooling was noted and strength of his non nutritive cough was sufficient,
Limited probed revealed that no drooling was noted and strength of his non nutritive cough was sufficient,

## 2018-01-02 NOTE — SWALLOW BEDSIDE ASSESSMENT ADULT - ORAL PREPARATORY PHASE
Pt was confused/distractible/fidgety at times when PO was offered. With that being stated, anticipatory mouth opening was noted and labial grading on utensils was felt to be grossly functional.
Pt was confused/distractible/fidgety at times when PO was offered. With that being stated, anticipatory mouth opening was noted and labial grading on utensils was felt to be grossly functional.

## 2018-01-02 NOTE — SWALLOW BEDSIDE ASSESSMENT ADULT - ASR SWALLOW RECOMMEND DIAG
DEFER MBS AS PT APPEARED CLINICALLY TOLERANT OF SUGGESTED PO TEXTURES FROM AN OROPHARYNGEAL SWALLOWING STANCE ON EXAM AND CONSIDERING HIS ALTERED MENTATION/COMPLIANCE.
DEFER MBS AS PT APPEARED CLINICALLY TOLERANT OF SUGGESTED PO TEXTURES FROM AN OROPHARYNGEAL SWALLOWING STANCE ON EXAM AND CONSIDERING HIS ALTERED MENTATION/COMPLIANCE.

## 2018-01-02 NOTE — SWALLOW BEDSIDE ASSESSMENT ADULT - COMMENTS
The patient was admitted from [facility] with renal failure which is suspectedly due to obstructive uropathy associated with BPH. Hospital course is notable for ARF, anemia and confusion with agitation. He has been given Haldol as needed. The pt is followed by Palliative Care. Pt has an underlying history of advanced dementia with combativeness. Additional prior medical history is as stated below.

## 2018-01-02 NOTE — SWALLOW BEDSIDE ASSESSMENT ADULT - SLP GENERAL OBSERVATIONS
Pt seen at bedside. On encounter, 1;1 nursing supervision was being provided, Pt was awake but distractible, restless and combative at times, He could not be directed to structured communication tasks. However, he did periodically responsively verbalize when asked concrete personally relevant questions. At these times, his verbal responses were intelligible, and linguistically intact.  However, his utterances were typically brief, fragmented and contextually inappropriate consistent with Cognitive Dysfunction with chronic component associated with dementia. Pt was a poor historian. , Pt seen at bedside. On encounter, 1;1 nursing supervision was being provided, Pt was awake but distractible, restless and combative at times, He could not be directed to structured communication tasks. However, he did periodically responsively verbalize when asked concrete personally relevant questions. At these times, his verbal responses were intelligible, and linguistically intact.  However, his utterances were typically brief, fragmented and contextually inappropriate consistent with Cognitive Dysfunction with chronic component associated with dementia. Pt was a poor historian.

## 2018-01-02 NOTE — PROGRESS NOTE ADULT - PROBLEM SELECTOR PLAN 2
-Stable  -New onset as of 12/27-12/28. s/p 2u pRBC on 12/28 with appropriate increase to 10.4/32 on 1/2/18  -Continue to trend CBC  -CT abd/pel performed 12/28 did not show source of bleeding

## 2018-01-02 NOTE — SWALLOW BEDSIDE ASSESSMENT ADULT - SWALLOW EVAL: CRITERIA FOR SKILLED INTERVENTION MET
Pt's Oral Dysphagia/Cognitive Dysfunction are felt to be chronic pre-exiting irreversible conditions. Acute speech path intervention would not /be of benefit. Moreover, his cognitive dysfunction is too severe for pt to be a viable speech/swallow therapy candidate nonetheless, As such. WILL NOT ACTIVELY FOLLOW. RECONSULT PRN.
Pt's Oral Dysphagia/Cognitive Dysfunction are felt to be chronic pre-exiting irreversible conditions. Acute speech path intervention would not /be of benefit. Moreover, his cognitive dysfunction is too severe for pt to be a viable speech/swallow therapy candidate nonetheless, As such. WILL NOT ACTIVELY FOLLOW. RECONSULT PRN.

## 2018-01-02 NOTE — PROGRESS NOTE ADULT - ASSESSMENT
Pt is a 82y old Male with hx of dementia with behavioral disturbance, depression, HTN admitted from [facility] with PRABHU, hydronephrosis noted on renal sono.  Per chart pt is uncooperative at times at baseline and came to ED with Loredo.  Hosp course notable for PRABHU, hematuria, s/p replacement which pt pulled out, hypernatremia, delirium, agitation.  Pt course complicated by anemia, fever, worsening PRABHU.  Palliative medicine consulted for assistance with goals of care.    1)Delirium, Agitation  -Improved   -Related to underlying dementia with behavioral disturbance complicated by environmental change, uremia, infection  -Per chart family reports sedative medications exacerbate rather than alleviate behavioral disturbance, combativeness  -Pt has responded to Haldol 0.5mg earlier in hospital course  -Staff report pt relatively calm unless pt care or procedure initiated  -Currently on enhanced supervision for safety  -Fall precautions  -avoid anticholinergic meds as reasonable as can exacerbate delirium    2)PRABHU  -Improving with Loredo in place  -Per chart review began at [facility] roughly one month ago  - and renal input reviewed  -Imaging reviewed  -Obstructive uropathy and bladder wall thickening  -Pt s/p Loredo but pulled out and now replaced  -On IVFs  -Per renal pt poor candidate for HD due to behavioral disturbance    3)BPH  -Gu, renal, imaging reviewed  -S/p  followup with loredo replaced 12/28  -Cr improving s/p loredo placement    4)Fever  -Remains afebrile  -Now with positive blood cxs but repeat negative  -ID input reviewed  - IV abx switched to PO    5)Anemia  -Maybe be partially dilutional due to IVFs but also with hematuria  -S/p transfusion PRBCS with appropriate response  -Monitor Hb    6)Dementia  -Chart indicates hx of behavioral disturbance  -Appears to be FAST 7B upon my assessment today- unclear what baseline PTA is  -Fall and aspiration precaution  -Currently on enhanced supervision due to delirium  -avoid anticholinergic meds as reasonable    7)Advance Directives, Goals of Care  -Pt does not have capacity to make medical decisions due to dementia  -Per staff pts daughter Amanda Basurto is pts health care agent  -Pt has no limits set on aggressive interventions at this time thus remain full code  -Message left with pts daughters to schedule family meeting- she has not returned the call.  will attempt to contact her again

## 2018-01-02 NOTE — PROGRESS NOTE ADULT - SUBJECTIVE AND OBJECTIVE BOX
Patient is a 82y Male w/o events.     REVIEW OF SYSTEMS:    Dementia    MEDICATIONS  (STANDING):  cefuroxime   Tablet 250 milliGRAM(s) Oral every 12 hours  finasteride 5 milliGRAM(s) Oral daily  heparin  Injectable 5000 Unit(s) SubCutaneous every 12 hours  naphazoline/pheniramine Solution 1 Drop(s) Both EYES two times a day  pantoprazole  Injectable 40 milliGRAM(s) IV Push daily  polyethylene glycol 3350 17 Gram(s) Oral two times a day  sodium chloride 0.9%. 1000 milliLiter(s) (75 mL/Hr) IV Continuous <Continuous>  tamsulosin 0.8 milliGRAM(s) Oral at bedtime    MEDICATIONS  (PRN):  acetaminophen   Tablet. 650 milliGRAM(s) Oral every 6 hours PRN Moderate Pain (4 - 6) and fever >101F  acetaminophen  Suppository 650 milliGRAM(s) Rectal every 6 hours PRN For Temp greater than 38 C (100.4 F)  haloperidol     Tablet 0.5 milliGRAM(s) Oral every 12 hours PRN agitation  ondansetron Injectable 4 milliGRAM(s) IV Push every 4 hours PRN Nausea and/or Vomiting        T(C): , Max: 37.1 (01-02-18 @ 05:18)  T(F): , Max: 98.8 (01-02-18 @ 05:18)  HR: 58 (01-02-18 @ 11:43)  BP: 122/62 (01-02-18 @ 11:43)  BP(mean): --  RR: 16 (01-02-18 @ 11:43)  SpO2: 100% (01-02-18 @ 11:43)  Wt(kg): --    01-01 @ 07:01  -  01-02 @ 07:00  --------------------------------------------------------  IN: 1578 mL / OUT: 2400 mL / NET: -822 mL    01-02 @ 07:01 - 01-02 @ 13:47  --------------------------------------------------------  IN: 720 mL / OUT: 350 mL / NET: 370 mL          PHYSICAL EXAM:    Constitutional: frail  HEENT: PERRLA, EOMI,  MMM  Neck: No LAD, No JVD  Respiratory: good aeration  Cardiovascular: S1 and S2, RRR  Gastrointestinal: BS+, soft, NT/ND  Extremities: No peripheral edema  Neurological: sedated  :  Chance  Skin: No rashes  Access: Not applicable        LABS:                        10.4   9.6   )-----------( 200      ( 02 Jan 2018 06:38 )             32.0     02 Jan 2018 06:38    146    |  114    |  45     ----------------------------<  111    4.6     |  25     |  2.62   01 Jan 2018 07:32    141    |  109    |  42     ----------------------------<  126    3.9     |  25     |  2.80   31 Dec 2017 09:40    148    |  114    |  51     ----------------------------<  122    4.5     |  25     |  3.48   30 Dec 2017 07:33    146    |  116    |  59     ----------------------------<  126    3.7     |  21     |  4.13     Ca    9.0        02 Jan 2018 06:38  Ca    8.6        01 Jan 2018 07:32  Ca    8.9        31 Dec 2017 09:40  Ca    8.7        30 Dec 2017 07:33  Phos  3.5       31 Dec 2017 09:40  Mg     2.3       31 Dec 2017 09:40  Mg     2.4       30 Dec 2017 07:33            Urine Studies:          RADIOLOGY & ADDITIONAL STUDIES:

## 2018-01-02 NOTE — SWALLOW BEDSIDE ASSESSMENT ADULT - ORAL PHASE
Bolus formation/transfer were mildly prolonged/disorganized/discontinuous but mechanically functional, Piecemeal deglutition was evident. Slight tongue debris noted with coarse solids only. Bolus formation/transfer were mildly prolonged/disorganized/discontinuous but mechanically functional, Piecemeal deglutition was evident. Mild tongue debris noted with coarse solids only.

## 2018-01-02 NOTE — PROGRESS NOTE ADULT - PROBLEM SELECTOR PLAN 1
-PRABHU on CKD stage 5  -Creatinine improving-Postobstructive Uropathy  -Chance back in place, placed by Dr. La.    Likely obstructive cause given pt's history of BPH, present on admission. Unclear if patient has CKD because we don't know his baseline Cr level.  - Urology consulted, placed coude that patient then removed (combative/dementia). Re-consulted, recommending palliative care consult due to likelihood of patient removing any catheter that is placed.  - Monitor H&H given bleeding from previous catheter placement  - Continue IV hydration   - Continue tamsulosin  - f/u nephrology consult -Postobstructive uropathy- Not good HD candidate given dementia and tendency to pull out catheters. HD is not indicated at this time unless kidney function continues to deteriorate.  - slowly improving- continue to monitor.

## 2018-01-02 NOTE — SWALLOW BEDSIDE ASSESSMENT ADULT - SWALLOW EVAL: RECOMMENDED FEEDING/EATING TECHNIQUES
alternate food with liquid/position upright (90 degrees)/small sips/bites
small sips/bites/alternate food with liquid/position upright (90 degrees)

## 2018-01-02 NOTE — SWALLOW BEDSIDE ASSESSMENT ADULT - SLP PRECAUTIONS/LIMITATIONS: HEARING
Unable to assess due to the severity of his Cognitive Dysfunction.
Unable to assess due to the severity of his Cognitive Dysfunction.

## 2018-01-02 NOTE — SWALLOW BEDSIDE ASSESSMENT ADULT - PHARYNGEAL PHASE
Swallow trigger was timely and laryngeal lift on palpation during swallow trials was felt to be functional/within age acceptable parameters. No behavioral aspiration signs exhibited on exam.
Swallow trigger was timely and laryngeal lift on palpation during swallow trials was felt to be functional/within age acceptable parameters. No behavioral aspiration signs exhibited on exam.

## 2018-01-02 NOTE — PROGRESS NOTE ADULT - PROBLEM SELECTOR PLAN 3
- Bcx from 12/27 growing GNRs w/ proteus.   -s/p zosyn and ceftriaxone; now PO ceftin  - Continue Tylenol PRN fevers  - Culture Results: f/u Ucx-negative  - Repeat Blood cultures - NGTD  -ID consult appreciated

## 2018-01-02 NOTE — PROGRESS NOTE ADULT - SUBJECTIVE AND OBJECTIVE BOX
HPI: Pt is a 82y old Male with hx of dementia with behavioral disturbance, depression, HTN admitted from [facility] with PRABHU, hydronephrosis noted on renal sono.  Per chart pt is uncooperative at times at baseline and came to ED with Loredo.  Hosp course notable for PRABHU, hematuria, s/p replacement which pt pulled out, hypernatremia, delirium, agitation.  Pt course complicated by anemia, fever, worsening PRABHU.  Palliative medicine consulted for assistance with goals of care.    Pt seen and examined by me with enhanced supervision aide at bedside, but no family present at bedside for followup of goals of care.  Pt is sleeping but wakes to my voice..  He is calm at time of my visit and today is more alert.  He attempts to answer my questions but with severe word finding difficulty.  Unable to obtain further hx due to dementia    Aide at bedside states mittens have been removed since this morning and so far he has remained calm    He has not received any Haldol in last 24 hrs      PAIN: ( )Yes   (X )No  assessed visually as pt unable to provide    DYSPNEA: ( ) Yes  (X ) No  assessed visually as pt unable to provide    Review of Systems:    Anxiety-  Depression-  Physical Discomfort-   Dyspnea-  Constipation-  Diarrhea-  Nausea-  Vomiting-  Anorexia-  Weight Loss-   Cough-  Secretions-  Fatigue-  Weakness-  Delirium-     Unable to obtain due to: dementia      PHYSICAL EXAM:    Vital Signs Last 24 Hrs  T(C): 37.1 (02 Jan 2018 05:18), Max: 37.1 (02 Jan 2018 05:18)  T(F): 98.8 (02 Jan 2018 05:18), Max: 98.8 (02 Jan 2018 05:18)  HR: 61 (02 Jan 2018 05:18) (61 - 82)  BP: 130/55 (02 Jan 2018 05:18) (117/51 - 135/74)  RR: 19 (02 Jan 2018 05:18) (16 - 19)  SpO2: 99% (02 Jan 2018 05:18) (96% - 99%)      PPSV2:  30 %  FAST: 7B    General: sleeping, wakes to my voice, calm  Mental Status: awake- attempts to answer questions. does not follow commands  HEENT: EOMI, dry oral mucosa  Lungs: CTA b/l  Cardiac: S1S2+  GI: soft, + bowels sounds  : incontinent- currently loredo in place with urine output  Ext: moves all 4 exts spontaneously- not to command  Neuro: awake- unable to assess further due to dementia      LABS                          10.4   9.6   )-----------( 200      ( 02 Jan 2018 06:38 )             32.0     01-02    146<H>  |  114<H>  |  45<H>  ----------------------------<  111<H>  4.6   |  25  |  2.62<H>    Ca    9.0      02 Jan 2018 06:38      RADIOLOGY/ADDITIONAL STUDIES:    EXAM:  CT ABDOMEN AND PELVIS                        PROCEDURE DATE:  12/28/2017    FINDINGS:    LOWER CHEST: Trace effusions and atelectasis    ABDOMEN:  LIVER: within normal limits  BILE DUCTS: normal caliber  GALLBLADDER: no calcified gallstones. normal caliber wall  PANCREAS: within normal limits  SPLEEN: within normal limits  ADRENALS: within normal limits  KIDNEYS: Moderate bilateral hydronephrosis,grossly stable.    PELVIS:  REPRODUCTIVE ORGANS: no pelvic masses  BLADDER: Thickened, contracted around Loredo catheter. Perivesical Fat   stranding, appears grossly similar to prior.    BOWEL: within normal limits  PERITONEUM: no ascites or free air, no fluid collection.  RETROPERITONEUM: no enlarged retroperitoneal or pelvic nodes. No   retroperitoneal or obvious soft tissue hemorrhage.    VESSELS: IVC filter.  ABDOMINAL WALL: within normal limits.  MUSCULOSKELETAL: Right hip arthroplasty. T12 vertebral compression   deformity.    EXAM:  US KIDNEYS AND BLADDER                        PROCEDURE DATE:  12/29/2017    FINDINGS:   CT dated 12/28/2017 available for review.    The right kidney measures 8.0 cm in length. Its contours are smooth.  No   focal lesion is found.  No calculi are seen. Moderate hydronephrosis and   proximal hydroureter is present.     The left kidney measures 9.8 cm in length. Its contours are smooth.  No   focal lesion is found.  No calculi are seen. Moderate hydronephrosis is   present.    The abdominal aorta measures normal caliber anterior to posterior.  The   IVC and retroperitoneal structures appear intact. Loredo catheter is seen   within the bladder.

## 2018-01-02 NOTE — PROGRESS NOTE ADULT - ASSESSMENT
82 with a history of CKD, CVA, dementia, HTN here with hypernatremia and PRABHU, renal evaluation. PRABHU multifactorial from dehydration but primary issue is obstructive uropathy.    PRABHU  -Renal function stable with loredo in   -Urology for long term plan, must coincide with palliative with his aggressive behavior and need for loredo    Hypernatremia  - Hypotonic IVF  -Oral intake as able    d/c with staff

## 2018-01-02 NOTE — SWALLOW BEDSIDE ASSESSMENT ADULT - ASR SWALLOW LABIAL MOBILITY
Limited probed revealed that no overt lip focality was evident during reflexive oral movements.
Limited probed revealed that no overt lip focality was evident during reflexive oral movements.

## 2018-01-02 NOTE — SWALLOW BEDSIDE ASSESSMENT ADULT - SWALLOW EVAL: RECOMMENDED DIET
SUGGEST A SOFT TEXTURE DIET WITH THIN LIQUID CONSISTENCIES AS HE APPEARS TO TOLERATE THESE FOOD CONSISTENCIES FROM AN OROPHARYNGEAL SWALLOWING STANCE AND PO TEXTURES ON THIS DIET ACCOMMODATE HIS ORAL DYSPHAGIA, NOTE THAT PT MUST BE ALERT/IN CALM STATE WHEN FEEDING.
STILL SUGGEST A SOFT TEXTURE DIET WITH THIN LIQUID CONSISTENCIES AS HE APPEARS TO TOLERATE THESE FOOD CONSISTENCIES FROM AN OROPHARYNGEAL SWALLOWING STANCE AND PO TEXTURES ON THIS DIET ACCOMMODATE HIS ORAL DYSPHAGIA, NOTE THAT PT MUST BE ALERT/IN CALM STATE WHEN FEEDING.

## 2018-01-02 NOTE — PROGRESS NOTE ADULT - SUBJECTIVE AND OBJECTIVE BOX
Pt has been seen and examined with FP resident, resident supervised agree with a/p       Patient is a 82y old  Male who presents with a chief complaint of Elevated creatinine (25 Dec 2017 02:36)        HPI:  81yo M w/PMHx of HTN, Dementia, Major Depressive  Disorder, BPH BIBA from [facility] w/BUN/cr of 74/4.10.      PHYSICAL EXAM:  Vital Signs Last 24 Hrs  T(C): 36.7 (02 Jan 2018 11:43), Max: 37.1 (02 Jan 2018 05:18)  T(F): 98 (02 Jan 2018 11:43), Max: 98.8 (02 Jan 2018 05:18)  HR: 58 (02 Jan 2018 11:43) (58 - 82)  BP: 122/62 (02 Jan 2018 11:43) (122/62 - 135/74)  BP(mean): --  RR: 16 (02 Jan 2018 11:43) (16 - 19)  SpO2: 100% (02 Jan 2018 11:43) (96% - 100%)  general- comfortable   -[id number]-aeeb,cta  -cvs-s1s2 normal   -p/a-soft,bs+          A/P    # Awaiting family meeting to discuss goal of care

## 2018-01-02 NOTE — SWALLOW BEDSIDE ASSESSMENT ADULT - SWALLOW EVAL: CURRENT DIET
NPO
It was suggested that pt be on a soft texture diet with thin liquids when last seen by this service for a consult dated 12/26/17.

## 2018-01-02 NOTE — SWALLOW BEDSIDE ASSESSMENT ADULT - ASR SWALLOW LINGUAL MOBILITY
Limited probed revealed that no overt tongue focality was evident during reflexive oral movements.
Limited probed revealed that no overt tongue focality was evident during reflexive oral movements.

## 2018-01-03 ENCOUNTER — TRANSCRIPTION ENCOUNTER (OUTPATIENT)
Age: 83
End: 2018-01-03

## 2018-01-03 VITALS
SYSTOLIC BLOOD PRESSURE: 126 MMHG | HEART RATE: 76 BPM | OXYGEN SATURATION: 98 % | RESPIRATION RATE: 16 BRPM | DIASTOLIC BLOOD PRESSURE: 71 MMHG | TEMPERATURE: 99 F

## 2018-01-03 LAB
ANION GAP SERPL CALC-SCNC: 10 MMOL/L — SIGNIFICANT CHANGE UP (ref 5–17)
ANION GAP SERPL CALC-SCNC: 8 MMOL/L — SIGNIFICANT CHANGE UP (ref 5–17)
BUN SERPL-MCNC: 39 MG/DL — HIGH (ref 7–23)
BUN SERPL-MCNC: 46 MG/DL — HIGH (ref 7–23)
CALCIUM SERPL-MCNC: 8.6 MG/DL — SIGNIFICANT CHANGE UP (ref 8.5–10.1)
CALCIUM SERPL-MCNC: 9.1 MG/DL — SIGNIFICANT CHANGE UP (ref 8.5–10.1)
CHLORIDE SERPL-SCNC: 113 MMOL/L — HIGH (ref 96–108)
CHLORIDE SERPL-SCNC: 113 MMOL/L — HIGH (ref 96–108)
CO2 SERPL-SCNC: 22 MMOL/L — SIGNIFICANT CHANGE UP (ref 22–31)
CO2 SERPL-SCNC: 24 MMOL/L — SIGNIFICANT CHANGE UP (ref 22–31)
CREAT SERPL-MCNC: 2.16 MG/DL — HIGH (ref 0.5–1.3)
CREAT SERPL-MCNC: 2.19 MG/DL — HIGH (ref 0.5–1.3)
GLUCOSE SERPL-MCNC: 106 MG/DL — HIGH (ref 70–99)
GLUCOSE SERPL-MCNC: 143 MG/DL — HIGH (ref 70–99)
HCT VFR BLD CALC: 32.9 % — LOW (ref 39–50)
HGB BLD-MCNC: 10.6 G/DL — LOW (ref 13–17)
MCHC RBC-ENTMCNC: 29.1 PG — SIGNIFICANT CHANGE UP (ref 27–34)
MCHC RBC-ENTMCNC: 32.3 GM/DL — SIGNIFICANT CHANGE UP (ref 32–36)
MCV RBC AUTO: 90.2 FL — SIGNIFICANT CHANGE UP (ref 80–100)
PLATELET # BLD AUTO: 214 K/UL — SIGNIFICANT CHANGE UP (ref 150–400)
POTASSIUM SERPL-MCNC: 4.4 MMOL/L — SIGNIFICANT CHANGE UP (ref 3.5–5.3)
POTASSIUM SERPL-MCNC: 4.7 MMOL/L — SIGNIFICANT CHANGE UP (ref 3.5–5.3)
POTASSIUM SERPL-SCNC: 4.4 MMOL/L — SIGNIFICANT CHANGE UP (ref 3.5–5.3)
POTASSIUM SERPL-SCNC: 4.7 MMOL/L — SIGNIFICANT CHANGE UP (ref 3.5–5.3)
RBC # BLD: 3.64 M/UL — LOW (ref 4.2–5.8)
RBC # FLD: 12.7 % — SIGNIFICANT CHANGE UP (ref 10.3–14.5)
SODIUM SERPL-SCNC: 145 MMOL/L — SIGNIFICANT CHANGE UP (ref 135–145)
SODIUM SERPL-SCNC: 145 MMOL/L — SIGNIFICANT CHANGE UP (ref 135–145)
WBC # BLD: 9.7 K/UL — SIGNIFICANT CHANGE UP (ref 3.8–10.5)
WBC # FLD AUTO: 9.7 K/UL — SIGNIFICANT CHANGE UP (ref 3.8–10.5)

## 2018-01-03 RX ORDER — SODIUM CHLORIDE 9 MG/ML
500 INJECTION, SOLUTION INTRAVENOUS
Qty: 0 | Refills: 0 | Status: DISCONTINUED | OUTPATIENT
Start: 2018-01-03 | End: 2018-01-03

## 2018-01-03 RX ORDER — CEFUROXIME AXETIL 250 MG
1 TABLET ORAL
Qty: 0 | Refills: 0 | COMMUNITY
Start: 2018-01-03

## 2018-01-03 RX ORDER — FINASTERIDE 5 MG/1
1 TABLET, FILM COATED ORAL
Qty: 0 | Refills: 0 | COMMUNITY
Start: 2018-01-03

## 2018-01-03 RX ORDER — ACETAMINOPHEN 500 MG
650 TABLET ORAL
Qty: 0 | Refills: 0 | COMMUNITY

## 2018-01-03 RX ORDER — CEFUROXIME AXETIL 250 MG
1 TABLET ORAL
Qty: 16 | Refills: 0 | OUTPATIENT
Start: 2018-01-03 | End: 2018-01-10

## 2018-01-03 RX ADMIN — HEPARIN SODIUM 5000 UNIT(S): 5000 INJECTION INTRAVENOUS; SUBCUTANEOUS at 05:49

## 2018-01-03 RX ADMIN — SODIUM CHLORIDE 70 MILLILITER(S): 9 INJECTION, SOLUTION INTRAVENOUS at 05:48

## 2018-01-03 RX ADMIN — PANTOPRAZOLE SODIUM 40 MILLIGRAM(S): 20 TABLET, DELAYED RELEASE ORAL at 13:16

## 2018-01-03 RX ADMIN — SODIUM CHLORIDE 1000 MILLILITER(S): 9 INJECTION, SOLUTION INTRAVENOUS at 13:15

## 2018-01-03 RX ADMIN — Medication 250 MILLIGRAM(S): at 05:49

## 2018-01-03 RX ADMIN — Medication 1 DROP(S): at 05:49

## 2018-01-03 RX ADMIN — FINASTERIDE 5 MILLIGRAM(S): 5 TABLET, FILM COATED ORAL at 13:16

## 2018-01-03 NOTE — DISCHARGE NOTE ADULT - HOSPITAL COURSE
83yo M w/PMHx of HTN, Dementia, Major Depressive  Disorder, BPH BIBA from [place] w/BUN/cr of 74/4.10.  From chart review, nursing home paperwork, and speaking to nursing home staff, pt's baseline mental status is disoriented and combative. For this reason, I was not able to attain a history from pt. I called [place] for collateral information.  Per the [place] supervisor,  Ms. Julieta Schroeder, pt's 10/23 BUN/cr was 46/1.82; 11/18 renal sonogram showed questionable hydronephrosis. Could not provide the reason behind the sonogram work up.    12/12 BUN/cr = 60/3.67.  12/21 BUN/cr = 73/3.87; at which point he was started on IVF 1/2 NS at 60cc/hr then eventually increased to 100cc/hr.   On 12/22 BUN/cr 81/4.29; 12/23 BUN/cr 74/4.10 and he had a Loredo inserted.   Per MsChina Alexandre, pt is on medication for BPH only because his family is refusing medication for tx of his other diagnoses.    In the ED, pt's loredo was changed, a UA was sent, and he received 1L NS bolus.  Pt is full code per accompanying MOLST form. (25 Dec 2017 02:36)    Upon admission, pt's loredo was draining out sanguinous urine. Patient pulled out his urinary catheter, developed scrotal edema,  u/s was negative( US Testicles (12.27.17 @ 16:01)-Moderate scrotal edema is noted.) At that time, his hemoglobin had dropped down to 6.7. He received 2 units of packed red blood cells and CT abdomen to survey for bleeding source (CT Abdomen and Pelvis No Cont (12.28.17 @ 12:24) No retroperitoneal hemorrhage. Other findings as above necrosis stable).     He also had several febrile episodes to 102F, blood cultures were + for proteus; s/p zosyn and ceftriaxone; should continue Ceftin PO Until January 11th, 2018.  Loredo was replaced, and due to pt's aggressive behavior of pulling out his Loredo in the past he has soft restraints (soft mittens) on his hands.    Nephro: -Not a candidate for HD due to aggressive behavior and sons wishes.    Pt to follow up with nephrology, urology, and primary care doctor within a week of discharge.    Pt seen and evluated at [facility] today. Not following commands.  Vital Signs Last 24 Hrs  T(C): 36.4 (03 Jan 2018 05:28), Max: 36.7 (02 Jan 2018 11:43)  T(F): 97.5 (03 Jan 2018 05:28), Max: 98 (02 Jan 2018 11:43)  HR: 92 (03 Jan 2018 05:28) (58 - 92)  BP: 154/93 (03 Jan 2018 05:28) (122/62 - 154/93)  RR: 18 (03 Jan 2018 05:28) (16 - 18)  SpO2: 98% (03 Jan 2018 05:28) (98% - 100%)    PHYSICAL EXAM:  Constitutional: NAD, awake, well-developed  Respiratory: Breath sounds are clear bilaterally, No wheezing, rales or rhonchi  Cardiovascular: S1 and S2, regular rate and rhythm  Gastrointestinal: Bowel Sounds present, soft, nontender, nondistended, no guarding, no rebound  Extremities: No peripheral edema  Vascular: 2+ peripheral pulses  Neurological:  Does not respond to commands  Musculoskeletal: Does not respond to commands    LABS                      10.4   9.6   )-----------( 200      ( 02 Jan 2018 06:38 )             32.0     02 Jan 2018 06:38    146    |  114    |  45     ----------------------------<  111    4.6     |  25     |  2.62     Ca    9.0        02 Jan 2018 06:38 83yo M w/PMHx of HTN, Dementia, Major Depressive  Disorder, BPH BIBA from [place] w/BUN/cr of 74/4.10.  From chart review, nursing home paperwork, and speaking to nursing home staff, pt's baseline mental status is disoriented and combative. For this reason, I was not able to attain a history from pt. I called [place] for collateral information.  Per the [place] supervisor,  Ms. Julieta Schroeder, pt's 10/23 BUN/cr was 46/1.82; 11/18 renal sonogram showed questionable hydronephrosis. Could not provide the reason behind the sonogram work up.    12/12 BUN/cr = 60/3.67.  12/21 BUN/cr = 73/3.87; at which point he was started on IVF 1/2 NS at 60cc/hr then eventually increased to 100cc/hr.   On 12/22 BUN/cr 81/4.29; 12/23 BUN/cr 74/4.10 and he had a Loredo inserted.   Per MsChina Alexandre, pt is on medication for BPH only because his family is refusing medication for tx of his other diagnoses.    In the ED, pt's loredo was changed, a UA was sent, and he received 1L NS bolus.  Pt is full code per accompanying MOLST form. (25 Dec 2017 02:36)    Upon admission, pt's loredo was draining out sanguinous urine. Patient pulled out his urinary catheter, developed scrotal edema,  u/s was negative( US Testicles (12.27.17 @ 16:01)-Moderate scrotal edema is noted.) At that time, his hemoglobin had dropped down to 6.7. He received 2 units of packed red blood cells and CT abdomen to survey for bleeding source (CT Abdomen and Pelvis No Cont (12.28.17 @ 12:24) No retroperitoneal hemorrhage. Other findings as above necrosis stable).     He also had several febrile episodes to 102F, blood cultures were + for proteus; s/p zosyn and ceftriaxone; should continue Ceftin PO Until January 11th, 2018.  Loredo was replaced, and due to pt's aggressive behavior of pulling out his Loredo in the past he has soft restraints (soft mittens) on his hands.    Nephro: -Not a candidate for HD due to aggressive behavior and sons wishes.    Soft mittens and constant observation for pt pulling out Loredo and other lines and pt is a huge fall risk  Pt to follow up with nephrology, urology, and primary care doctor within a week of discharge.    Pt seen and evaluated at bedside today. Not following commands.    Vital Signs Last 24 Hrs  T(C): 36.4 (03 Jan 2018 05:28), Max: 36.7 (02 Jan 2018 11:43)  T(F): 97.5 (03 Jan 2018 05:28), Max: 98 (02 Jan 2018 11:43)  HR: 92 (03 Jan 2018 05:28) (58 - 92)  BP: 154/93 (03 Jan 2018 05:28) (122/62 - 154/93)  RR: 18 (03 Jan 2018 05:28) (16 - 18)  SpO2: 98% (03 Jan 2018 05:28) (98% - 100%)    PHYSICAL EXAM:  Constitutional: NAD, awake, well-developed  Respiratory: Breath sounds are clear bilaterally, No wheezing, rales or rhonchi  Cardiovascular: S1 and S2, regular rate and rhythm  Gastrointestinal: Bowel Sounds present, soft, nontender, nondistended, no guarding, no rebound  Extremities: No peripheral edema  Vascular: 2+ peripheral pulses  Neurological:  Does not respond to commands  Musculoskeletal: Does not respond to commands    LABS                      10.4   9.6   )-----------( 200      ( 02 Jan 2018 06:38 )             32.0     02 Jan 2018 06:38    146    |  114    |  45     ----------------------------<  111    4.6     |  25     |  2.62     Ca    9.0        02 Jan 2018 06:38 83yo M w/PMHx of HTN, Dementia, Major Depressive  Disorder, BPH BIBA from [place] w/BUN/cr of 74/4.10.  From chart review, nursing home paperwork, and speaking to nursing home staff, pt's baseline mental status is disoriented and combative. For this reason, I was not able to attain a history from pt. I called [place] for collateral information.  Per the [place] supervisor,  Ms. Julieta Schroeder, pt's 10/23 BUN/cr was 46/1.82; 11/18 renal sonogram showed questionable hydronephrosis. Could not provide the reason behind the sonogram work up.    12/12 BUN/cr = 60/3.67.  12/21 BUN/cr = 73/3.87; at which point he was started on IVF 1/2 NS at 60cc/hr then eventually increased to 100cc/hr.   On 12/22 BUN/cr 81/4.29; 12/23 BUN/cr 74/4.10 and he had a Loredo inserted.   Per MsChina Alexandre, pt is on medication for BPH only because his family is refusing medication for tx of his other diagnoses.    In the ED, pt's loredo was changed, a UA was sent, and he received 1L NS bolus.  Pt is full code per accompanying MOLST form. (25 Dec 2017 02:36)    Upon admission, pt's loredo was draining out sanguinous urine. Patient pulled out his urinary catheter, developed scrotal edema,  u/s was negative( US Testicles (12.27.17 @ 16:01)-Moderate scrotal edema is noted.) At that time, his hemoglobin had dropped down to 6.7. He received 2 units of packed red blood cells and CT abdomen to survey for bleeding source (CT Abdomen and Pelvis No Cont (12.28.17 @ 12:24) No retroperitoneal hemorrhage. Other findings as above necrosis stable).     He also had several febrile episodes to 102F, blood cultures were + for proteus; s/p zosyn and ceftriaxone; should continue Ceftin PO Until January 11th, 2018.  Loredo was replaced, and due to pt's aggressive behavior of pulling out his Loredo in the past he has soft restraints (soft mittens) on his hands.    Nephro: -Not a candidate for HD due to aggressive behavior and sons wishes.  Soft mittens and constant observation for pt pulling out Loredo and other lines and pt is a huge fall risk  Follow up with Urology for Loredo change on January 8th, 2018  Pt to follow up with nephrology, urology, and primary care doctor within a week of discharge.    Pt seen and evaluated at bedside today. Not following commands.    Vital Signs Last 24 Hrs  T(C): 36.4 (03 Jan 2018 05:28), Max: 36.7 (02 Jan 2018 11:43)  T(F): 97.5 (03 Jan 2018 05:28), Max: 98 (02 Jan 2018 11:43)  HR: 92 (03 Jan 2018 05:28) (58 - 92)  BP: 154/93 (03 Jan 2018 05:28) (122/62 - 154/93)  RR: 18 (03 Jan 2018 05:28) (16 - 18)  SpO2: 98% (03 Jan 2018 05:28) (98% - 100%)    PHYSICAL EXAM:  Constitutional: NAD, awake, well-developed  Respiratory: Breath sounds are clear bilaterally, No wheezing, rales or rhonchi  Cardiovascular: S1 and S2, regular rate and rhythm  Gastrointestinal: Bowel Sounds present, soft, nontender, nondistended, no guarding, no rebound  Extremities: No peripheral edema  Vascular: 2+ peripheral pulses  Neurological:  Does not respond to commands  Musculoskeletal: Does not respond to commands    LABS                    10.4   9.6   )-----------( 200      ( 02 Jan 2018 06:38 )             32.0     02 Jan 2018 06:38    146    |  114    |  45     ----------------------------<  111    4.6     |  25     |  2.62     Ca    9.0        02 Jan 2018 06:38 81yo M w/PMHx of HTN, Dementia, Major Depressive  Disorder, BPH BIBA from [place] w/BUN/cr of 74/4.10.  From chart review, nursing home paperwork, and speaking to nursing home staff, pt's baseline mental status is disoriented and combative. For this reason, I was not able to attain a history from pt. I called [place] for collateral information.  Per the [place] supervisor,  Ms. Julieta Schroeder, pt's 10/23 BUN/cr was 46/1.82; 11/18 renal sonogram showed questionable hydronephrosis. Could not provide the reason behind the sonogram work up.    12/12 BUN/cr = 60/3.67.  12/21 BUN/cr = 73/3.87; at which point he was started on IVF 1/2 NS at 60cc/hr then eventually increased to 100cc/hr.   On 12/22 BUN/cr 81/4.29; 12/23 BUN/cr 74/4.10 and he had a Loredo inserted.   Per MsChina Alexandre, pt is on medication for BPH only because his family is refusing medication for tx of his other diagnoses.    In the ED, pt's loredo was changed, a UA was sent, and he received 1L NS bolus.  Pt is full code per accompanying MOLST form. (25 Dec 2017 02:36)    Upon admission, pt's loredo was draining out sanguinous urine. Patient pulled out his urinary catheter, developed scrotal edema,  u/s was negative( US Testicles (12.27.17 @ 16:01)-Moderate scrotal edema is noted.) At that time, his hemoglobin had dropped down to 6.7. He received 2 units of packed red blood cells and CT abdomen to survey for bleeding source (CT Abdomen and Pelvis No Cont (12.28.17 @ 12:24) No retroperitoneal hemorrhage. Other findings as above necrosis stable).     He also had several febrile episodes to 102F, blood cultures were + for proteus; s/p zosyn and ceftriaxone; should continue Ceftin PO Until January 11th, 2018.  Loredo was replaced, and due to pt's aggressive behavior of pulling out his Loredo in the past he has soft restraints (soft mittens) on his hands.    Nephro: -Not a candidate for HD due to aggressive behavior and sons wishes.  Soft mittens and constant observation for pt pulling out Loredo and other lines and pt is a huge fall risk  Follow up with Urology for Loredo change on January 8th, 2018  Pt to follow up with nephrology, urology, and primary care doctor within a week of discharge.    Pt seen and evaluated at bedside today. Not following commands.    Vital Signs Last 24 Hrs  T(C): 36.4 (03 Jan 2018 05:28), Max: 36.7 (02 Jan 2018 11:43)  T(F): 97.5 (03 Jan 2018 05:28), Max: 98 (02 Jan 2018 11:43)  HR: 92 (03 Jan 2018 05:28) (58 - 92)  BP: 154/93 (03 Jan 2018 05:28) (122/62 - 154/93)  RR: 18 (03 Jan 2018 05:28) (16 - 18)  SpO2: 98% (03 Jan 2018 05:28) (98% - 100%)    PHYSICAL EXAM:  Constitutional: NAD, awake, well-developed  Respiratory: Breath sounds are clear bilaterally, No wheezing, rales or rhonchi  Cardiovascular: S1 and S2, regular rate and rhythm  Gastrointestinal: Bowel Sounds present, soft, nontender, nondistended, no guarding, no rebound  Extremities: No peripheral edema  Vascular: 2+ peripheral pulses  Neurological:  Does not respond to commands  Musculoskeletal: Does not respond to commands    LABS                    10.4   9.6   )-----------( 200      ( 02 Jan 2018 06:38 )             32.0     02 Jan 2018 06:38    146    |  114    |  45     ----------------------------<  111    4.6     |  25     |  2.62     Ca    9.0        02 Jan 2018 06:38    1/3/18: Mr. Bob Roldan has been contacted and fully aware of discharge plan. Understanding assessed via teach back method. All questions answered. 83yo M w/PMHx of HTN, Dementia, Major Depressive  Disorder, BPH BIBA from [place] w/BUN/cr of 74/4.10.  From chart review, nursing home paperwork, and speaking to nursing home staff, pt's baseline mental status is disoriented and combative. For this reason, I was not able to attain a history from pt. I called [place] for collateral information.  Per the [place] supervisor,  Ms. Julieta Schroeder, pt's 10/23 BUN/cr was 46/1.82; 11/18 renal sonogram showed questionable hydronephrosis. Could not provide the reason behind the sonogram work up.    12/12 BUN/cr = 60/3.67.  12/21 BUN/cr = 73/3.87; at which point he was started on IVF 1/2 NS at 60cc/hr then eventually increased to 100cc/hr.   On 12/22 BUN/cr 81/4.29; 12/23 BUN/cr 74/4.10 and he had a Loredo inserted.   Per MsChina Alexandre, pt is on medication for BPH only because his family is refusing medication for tx of his other diagnoses.    In the ED, pt's loredo was changed, a UA was sent, and he received 1L NS bolus.  Pt is full code per accompanying MOLST form. (25 Dec 2017 02:36)    Upon admission, pt's loredo was draining out sanguinous urine. Patient pulled out his urinary catheter, developed scrotal edema,  u/s was negative( US Testicles (12.27.17 @ 16:01)-Moderate scrotal edema is noted.) At that time, his hemoglobin had dropped down to 6.7. He received 2 units of packed red blood cells and CT abdomen to survey for bleeding source (CT Abdomen and Pelvis No Cont (12.28.17 @ 12:24) No retroperitoneal hemorrhage. Other findings as above necrosis stable).     He also had several febrile episodes to 102F, blood cultures were + for proteus; s/p zosyn and ceftriaxone; should continue Ceftin PO Until January 11th, 2018.  Loredo was replaced, and due to pt's aggressive behavior of pulling out his Loredo in the past he has soft restraints (soft mittens) on his hands.    Nephro: -Not a candidate for HD due to aggressive behavior and sons wishes.  Soft mittens and constant observation for pt pulling out Loredo and other lines and pt is a huge fall risk  Follow up with Urology for Loredo change  Pt to follow up with nephrology, urology, and primary care doctor within a week of discharge.    Pt seen and evaluated at bedside today. Not following commands.    Vital Signs Last 24 Hrs  T(C): 36.4 (03 Jan 2018 05:28), Max: 36.7 (02 Jan 2018 11:43)  T(F): 97.5 (03 Jan 2018 05:28), Max: 98 (02 Jan 2018 11:43)  HR: 92 (03 Jan 2018 05:28) (58 - 92)  BP: 154/93 (03 Jan 2018 05:28) (122/62 - 154/93)  RR: 18 (03 Jan 2018 05:28) (16 - 18)  SpO2: 98% (03 Jan 2018 05:28) (98% - 100%)    PHYSICAL EXAM:  Constitutional: NAD, awake, well-developed  Respiratory: Breath sounds are clear bilaterally, No wheezing, rales or rhonchi  Cardiovascular: S1 and S2, regular rate and rhythm  Gastrointestinal: Bowel Sounds present, soft, nontender, nondistended, no guarding, no rebound  Extremities: No peripheral edema  Vascular: 2+ peripheral pulses  Neurological: AAO X1       LABS                    10.4   9.6   )-----------( 200      ( 02 Jan 2018 06:38 )             32.0     02 Jan 2018 06:38    146    |  114    |  45     ----------------------------<  111    4.6     |  25     |  2.62     Ca    9.0        02 Jan 2018 06:38    1/3/18: Mr. Bob Roldan has been contacted and fully aware of discharge plan. Understanding assessed via teach back method. All questions answered.

## 2018-01-03 NOTE — PROGRESS NOTE ADULT - PROBLEM SELECTOR PROBLEM 8
Prophylactic measure
Bacteremia
Prophylactic measure
Bacteremia
Bacteremia

## 2018-01-03 NOTE — DISCHARGE NOTE ADULT - PLAN OF CARE
To prevent future kidney injuries. - If you experience burning with urination, decreased urine output, flank pain, with a fever (TEMP >100.4F), call your doctor or go to the ED immediately  - Oral hydration  - Take medication as prescribed   - Follow up with your primary care physician Prevent bleeding - If you experience headache, blurry vision, weakness, chest pain/palpitations, call your primary care physician or go to the ED immediately  - Check your blood pressure at home and keep a log  - Take medication as prescribed  - Follow up with primary care physician Prevent urinary  obstruction - If you experience decrease urine output despite adequate oral hydration, please contact your doctor immediately.  - Take medication as prescribed  - Follow up with primary care physician Prevent aggressive behavior and pulling out of medical lines - If patient seems more aggressive than usual or seems withdrawn and/or depressed. Please contact primary care doctor immediately  - Take medication as prescribed  - Follow up with primary care physician Control your blood pressure levels - If you experience headache, blurry vision, chest pain, numbness/tingling in your extremities, call your primary care physician or go to the ED immediately  - Check your blood pressure at home and keep a log  - Low sodium diet  - Take medication as prescribed  - Follow up with primary care physician - If you experience burning with urination, decreased urine output, flank pain, with a fever (TEMP >100.4F), call your doctor or go to the ED immediately  - Oral hydration  - Take medication as prescribed   - Follow up with Urology for loredo change on January 8th, 2018  - Follow up with your primary care physician - If you experience headache, blurry vision, chest pain, numbness/tingling in your extremities, call your primary care physician or go to the ED immediately  - Check your blood pressure at home and keep a log  - Low sodium diet  - Not on Medication per family's wishes  - Follow up with primary care physician - If you experience burning with urination, decreased urine output, flank pain, with a fever (TEMP >100.4F), call your doctor or go to the ED immediately  - Oral hydration  - Take medication as prescribed   - Follow up with Urology for loredo change   - Follow up with your primary care physician

## 2018-01-03 NOTE — PROGRESS NOTE ADULT - PROBLEM SELECTOR PLAN 7
- acute on chronic anemia in the setting of IV hydration and hematuria  - monitor H/H closely, now stable  - CT of the abdomen reviewed without any bleeding
- acute on chronic anemia in the setting of IV hydration and hematuria  - monitor H/H closely  - CT of the abdomen reviewed. US of the abdomen reviewed.
Not hypertensive at this time, will monitor
Will hold chemical DVT prophylaxis given hematuria
- acute on chronic anemia in the setting of IV hydration and hematuria  - monitor H/H closely, now stable  - CT of the abdomen reviewed without any bleeding
- Not hypertensive at this time, will monitor
- acute on chronic anemia in the setting of IV hydration and hematuria  - monitor H/H closely  - will also obtain CT of the abdomen and pelvis to evaluate abdominal pathology

## 2018-01-03 NOTE — DISCHARGE NOTE ADULT - CARE PROVIDERS DIRECT ADDRESSES
,DirectAddress_Unknown,[email or web address]

## 2018-01-03 NOTE — DISCHARGE NOTE ADULT - CARE PLAN
Principal Discharge DX:	Acute renal failure, unspecified acute renal failure type  Goal:	To prevent future kidney injuries.  Instructions for follow-up, activity and diet:	- If you experience burning with urination, decreased urine output, flank pain, with a fever (TEMP >100.4F), call your doctor or go to the ED immediately  - Oral hydration  - Take medication as prescribed   - Follow up with your primary care physician  Secondary Diagnosis:	Anemia  Goal:	Prevent bleeding  Instructions for follow-up, activity and diet:	- If you experience headache, blurry vision, weakness, chest pain/palpitations, call your primary care physician or go to the ED immediately  - Check your blood pressure at home and keep a log  - Take medication as prescribed  - Follow up with primary care physician  Secondary Diagnosis:	Benign prostatic hyperplasia, unspecified whether lower urinary tract symptoms present  Goal:	Prevent urinary  obstruction  Instructions for follow-up, activity and diet:	- If you experience decrease urine output despite adequate oral hydration, please contact your doctor immediately.  - Take medication as prescribed  - Follow up with primary care physician  Secondary Diagnosis:	Dementia with behavioral disturbance, unspecified dementia type  Goal:	Prevent aggressive behavior and pulling out of medical lines  Instructions for follow-up, activity and diet:	- If patient seems more aggressive than usual or seems withdrawn and/or depressed. Please contact primary care doctor immediately  - Take medication as prescribed  - Follow up with primary care physician  Secondary Diagnosis:	Essential hypertension  Goal:	Control your blood pressure levels  Instructions for follow-up, activity and diet:	- If you experience headache, blurry vision, chest pain, numbness/tingling in your extremities, call your primary care physician or go to the ED immediately  - Check your blood pressure at home and keep a log  - Low sodium diet  - Take medication as prescribed  - Follow up with primary care physician Principal Discharge DX:	Acute renal failure, unspecified acute renal failure type  Goal:	To prevent future kidney injuries.  Instructions for follow-up, activity and diet:	- If you experience burning with urination, decreased urine output, flank pain, with a fever (TEMP >100.4F), call your doctor or go to the ED immediately  - Oral hydration  - Take medication as prescribed   - Follow up with Urology for loredo change on January 8th, 2018  - Follow up with your primary care physician  Secondary Diagnosis:	Anemia  Goal:	Prevent bleeding  Instructions for follow-up, activity and diet:	- If you experience headache, blurry vision, weakness, chest pain/palpitations, call your primary care physician or go to the ED immediately  - Check your blood pressure at home and keep a log  - Take medication as prescribed  - Follow up with primary care physician  Secondary Diagnosis:	Benign prostatic hyperplasia, unspecified whether lower urinary tract symptoms present  Goal:	Prevent urinary  obstruction  Instructions for follow-up, activity and diet:	- If you experience decrease urine output despite adequate oral hydration, please contact your doctor immediately.  - Take medication as prescribed  - Follow up with primary care physician  Secondary Diagnosis:	Dementia with behavioral disturbance, unspecified dementia type  Goal:	Prevent aggressive behavior and pulling out of medical lines  Instructions for follow-up, activity and diet:	- If patient seems more aggressive than usual or seems withdrawn and/or depressed. Please contact primary care doctor immediately  - Take medication as prescribed  - Follow up with primary care physician  Secondary Diagnosis:	Essential hypertension  Goal:	Control your blood pressure levels  Instructions for follow-up, activity and diet:	- If you experience headache, blurry vision, chest pain, numbness/tingling in your extremities, call your primary care physician or go to the ED immediately  - Check your blood pressure at home and keep a log  - Low sodium diet  - Not on Medication per family's wishes  - Follow up with primary care physician Principal Discharge DX:	Acute renal failure, unspecified acute renal failure type  Goal:	To prevent future kidney injuries.  Instructions for follow-up, activity and diet:	- If you experience burning with urination, decreased urine output, flank pain, with a fever (TEMP >100.4F), call your doctor or go to the ED immediately  - Oral hydration  - Take medication as prescribed   - Follow up with Urology for loredo change   - Follow up with your primary care physician  Secondary Diagnosis:	Anemia  Goal:	Prevent bleeding  Instructions for follow-up, activity and diet:	- If you experience headache, blurry vision, weakness, chest pain/palpitations, call your primary care physician or go to the ED immediately  - Check your blood pressure at home and keep a log  - Take medication as prescribed  - Follow up with primary care physician  Secondary Diagnosis:	Benign prostatic hyperplasia, unspecified whether lower urinary tract symptoms present  Goal:	Prevent urinary  obstruction  Instructions for follow-up, activity and diet:	- If you experience decrease urine output despite adequate oral hydration, please contact your doctor immediately.  - Take medication as prescribed  - Follow up with primary care physician  Secondary Diagnosis:	Dementia with behavioral disturbance, unspecified dementia type  Goal:	Prevent aggressive behavior and pulling out of medical lines  Instructions for follow-up, activity and diet:	- If patient seems more aggressive than usual or seems withdrawn and/or depressed. Please contact primary care doctor immediately  - Take medication as prescribed  - Follow up with primary care physician  Secondary Diagnosis:	Essential hypertension  Goal:	Control your blood pressure levels  Instructions for follow-up, activity and diet:	- If you experience headache, blurry vision, chest pain, numbness/tingling in your extremities, call your primary care physician or go to the ED immediately  - Check your blood pressure at home and keep a log  - Low sodium diet  - Not on Medication per family's wishes  - Follow up with primary care physician

## 2018-01-03 NOTE — PROGRESS NOTE ADULT - ATTENDING COMMENTS
on exam- comfortable   -rs-aeeb,cta    A/P    #D/c today, encourage po intake     #time spent more than 30 minutes on exam- comfortable   -rs-aeeb,cta    A/P    #D/c today, encourage po intake     #time spent more than 65 minutes

## 2018-01-03 NOTE — DISCHARGE NOTE ADULT - PATIENT PORTAL LINK FT
“You can access the Follow[facility] Patient Portal, offered by [facility], by registering with the following website: [email or web address]”

## 2018-01-03 NOTE — PROGRESS NOTE ADULT - ASSESSMENT
Pt is a 82y old Male with hx of dementia with behavioral disturbance, depression, HTN admitted from [facility] with PRABHU, hydronephrosis noted on renal sono.  Per chart pt is uncooperative at times at baseline and came to ED with Loredo.  Hosp course notable for PRABHU, hematuria, s/p replacement which pt pulled out, hypernatremia, delirium, agitation.  Pt course complicated by anemia, fever, worsening PRABHU.  Palliative medicine consulted for assistance with goals of care.    1)Delirium, Agitation  -Improved   -Related to underlying dementia with behavioral disturbance complicated by environmental change, uremia, infection  -Per chart family reports sedative medications exacerbate rather than alleviate behavioral disturbance, combativeness  -Pt has responded to Haldol 0.5mg earlier in hospital course  -Staff report pt relatively calm unless pt care or procedure initiated  -Currently on enhanced supervision for safety  -Fall precautions  -avoid anticholinergic meds as reasonable as can exacerbate delirium    2)PRABHU  -Improving with Loredo in place  -Per chart review,  began at [facility] roughly one month ago  - and renal input reviewed  -Imaging reviewed  -Obstructive uropathy and bladder wall thickening  -Pt s/p Loredo initally pulled out and now replaced  -Per renal pt poor candidate for HD due to behavioral disturbance but Cr has improved with Loredo in place and pt appears to be tolerating it    3)BPH  -Gu, renal, imaging reviewed  -S/p  followup with loredo replaced 12/28  -Cr improving s/p loredo placement  -Plan for D/c with loredo in place and  followup    4)Fever  -Remains afebrile  -Now with positive blood cxs but repeat negative  -ID input reviewed  - IV abx switched to PO    5)Anemia  -Maybe be partially dilutional due to IVFs but also with hematuria  -S/p transfusion PRBCS with appropriate response  -Monitor Hb    6)Dementia  -Chart indicates hx of behavioral disturbance  -Appears to be FAST 7B upon my assessment today- unclear what baseline PTA is  -Fall and aspiration precaution  -Currently on enhanced supervision due to delirium  -avoid anticholinergic meds as reasonable    7)Advance Directives, Goals of Care  -Pt does not have capacity to make medical decisions due to dementia  -Per staff pts daughter Amanda Basurto is pts health care agent  -Pt has no limits set on aggressive interventions at this time thus remain full code  -Message left with pts daughters to schedule family meeting x 2- still awaiting call back

## 2018-01-03 NOTE — PROGRESS NOTE ADULT - PROBLEM SELECTOR PLAN 1
-PRABHU on CKD stage 5  -Creatinine improving-Postobstructive Uropathy  -Loredo back in place, placed by Dr. La.    - Soft mittens and constant observation for pt pulling out loredo and other lines and also trying to climb out of bed.  - Urology consulted, placed coude that patient then removed (combative/dementia). Re-consulted, recommending palliative care consult due to likelihood of patient removing any catheter that is placed.  - Monitor H&H given bleeding from previous catheter placement  - Continue IV hydration   - Continue tamsulosin  - f/u nephrology consult -Postobstructive uropathy- Not good HD candidate given dementia and tendency to pull out catheters. HD is not indicated at this time unless kidney function continues to deteriorate.  - slowly improving- continue to monitor.  - D/C planning today

## 2018-01-03 NOTE — DISCHARGE NOTE ADULT - CARE PROVIDER_API CALL
Poncho Celis), Critical Care Medicine; HospicePalliative Medicine; Internal Medicine; Pulmonary Disease  [address] [place] [address]  Phone: [phone]  Fax: [phone]    Arjun Bishop), Internal Medicine; Nephrology  [address] [place] [address]  Phone: [phone]  Fax: [phone]    Tereso Beverly), Urology  [address] [place] [address]  Phone: [phone]  Fax: [phone]

## 2018-01-03 NOTE — PROGRESS NOTE ADULT - SUBJECTIVE AND OBJECTIVE BOX
Patient is a 82y Male , 1:1 at bedside    REVIEW OF SYSTEMS:    UTO asleep    MEDICATIONS  (STANDING):  cefuroxime   Tablet 250 milliGRAM(s) Oral every 12 hours  finasteride 5 milliGRAM(s) Oral daily  heparin  Injectable 5000 Unit(s) SubCutaneous every 12 hours  naphazoline/pheniramine Solution 1 Drop(s) Both EYES two times a day  pantoprazole  Injectable 40 milliGRAM(s) IV Push daily  polyethylene glycol 3350 17 Gram(s) Oral two times a day  sodium chloride 0.45%. 1000 milliLiter(s) (70 mL/Hr) IV Continuous <Continuous>  tamsulosin 0.8 milliGRAM(s) Oral at bedtime    MEDICATIONS  (PRN):  acetaminophen   Tablet. 650 milliGRAM(s) Oral every 6 hours PRN Moderate Pain (4 - 6) and fever >101F  acetaminophen  Suppository 650 milliGRAM(s) Rectal every 6 hours PRN For Temp greater than 38 C (100.4 F)  haloperidol     Tablet 0.5 milliGRAM(s) Oral every 12 hours PRN agitation  ondansetron Injectable 4 milliGRAM(s) IV Push every 4 hours PRN Nausea and/or Vomiting        T(C): , Max: 36.7 (01-02-18 @ 11:43)  T(F): , Max: 98 (01-02-18 @ 11:43)  HR: 92 (01-03-18 @ 05:28)  BP: 154/93 (01-03-18 @ 05:28)  BP(mean): --  RR: 18 (01-03-18 @ 05:28)  SpO2: 98% (01-03-18 @ 05:28)  Wt(kg): --    01-02 @ 07:01  -  01-03 @ 07:00  --------------------------------------------------------  IN: 1942 mL / OUT: 2050 mL / NET: -108 mL          PHYSICAL EXAM:    Constitutional:frail  Neck: No LAD, No JVD  Respiratory good aeration  Cardiovascular: S1 and S2, RRR  Gastrointestinal: BS+, soft, NT/ND  Extremities: No peripheral edema  Neurological: Asleep but arousable  :  Robson      LABS:                        10.6   9.7   )-----------( 214      ( 03 Jan 2018 08:14 )             32.9     03 Jan 2018 08:14    145    |  113    |  39     ----------------------------<  106    4.4     |  22     |  2.16   02 Jan 2018 06:38    146    |  114    |  45     ----------------------------<  111    4.6     |  25     |  2.62   01 Jan 2018 07:32    141    |  109    |  42     ----------------------------<  126    3.9     |  25     |  2.80   31 Dec 2017 09:40    148    |  114    |  51     ----------------------------<  122    4.5     |  25     |  3.48     Ca    9.1        03 Jan 2018 08:14  Ca    9.0        02 Jan 2018 06:38  Ca    8.6        01 Jan 2018 07:32  Ca    8.9        31 Dec 2017 09:40  Phos  3.5       31 Dec 2017 09:40  Mg     2.3       31 Dec 2017 09:40            Urine Studies:          RADIOLOGY & ADDITIONAL STUDIES:

## 2018-01-03 NOTE — PROGRESS NOTE ADULT - PROBLEM SELECTOR PLAN 2
- Stable  - New onset as of 12/27-12/28. s/p 2u pRBC on 12/28 with appropriate increase to 10.4/32 on 1/2/18  - Continue to trend CBC  - CT abd/pel performed 12/28 did not show source of bleeding

## 2018-01-03 NOTE — PROGRESS NOTE ADULT - PROBLEM SELECTOR PLAN 3
- Bcx from 12/27 growing GNRs w/ proteus.   - s/p zosyn and ceftriaxone; now PO ceftin until 1/11/18  - Continue Tylenol PRN fevers  - Culture Results: f/u Ucx-negative  - Repeat Blood cultures - NGTD  - ID consult appreciated

## 2018-01-03 NOTE — DISCHARGE NOTE ADULT - MEDICATION SUMMARY - MEDICATIONS TO TAKE
I will START or STAY ON the medications listed below when I get home from the hospital:    finasteride 5 mg oral tablet  -- 1 tab(s) by mouth once a day  -- Indication: For BPH (benign prostatic hyperplasia)    TAMSULOSIN HCL 0.4 MG CAPSULE  -- Indication: For BPH (benign prostatic hyperplasia)    cefuroxime 250 mg oral tablet  -- 1 tab(s) by mouth every 12 hours  -- Indication: For Bacteremia    cefuroxime 250 mg oral tablet  -- 1 tab(s) by mouth every 12 hours  -- Indication: For Bacteremia I will START or STAY ON the medications listed below when I get home from the hospital:    finasteride 5 mg oral tablet  -- 1 tab(s) by mouth once a day  -- Indication: For BPH (benign prostatic hyperplasia)    TAMSULOSIN HCL 0.4 MG CAPSULE  -- Indication: For BPH (benign prostatic hyperplasia)    cefuroxime 250 mg oral tablet  -- 1 tab(s) by mouth every 12 hours  -- Indication: For Bacteremia

## 2018-01-03 NOTE — DISCHARGE NOTE ADULT - ADDITIONAL INSTRUCTIONS
FOLLOW UP WITH THE PCP WITHIN A WEEK OF THE DISCHARGE  FOLLOW UP WITH THE UROLOGY FOR THE FOLEYS CHANGE  FOLLOW UP WITH THE NEPHROLOGY

## 2018-01-03 NOTE — PROGRESS NOTE ADULT - PROBLEM SELECTOR PROBLEM 7
Anemia
Anemia
Hypertension, unspecified type
Prophylactic measure
Anemia
Hypertension, unspecified type
Anemia

## 2018-01-03 NOTE — DISCHARGE NOTE ADULT - SECONDARY DIAGNOSIS.
Anemia Benign prostatic hyperplasia, unspecified whether lower urinary tract symptoms present Dementia with behavioral disturbance, unspecified dementia type Essential hypertension

## 2018-01-03 NOTE — PROGRESS NOTE ADULT - SUBJECTIVE AND OBJECTIVE BOX
HPI: Pt is a 82y old Male with hx of dementia with behavioral disturbance, depression, HTN admitted from [facility] with PRABHU, hydronephrosis noted on renal sono.  Per chart pt is uncooperative at times at baseline and came to ED with Loredo.  Hosp course notable for PRABHU, hematuria, s/p replacement which pt pulled out, hypernatremia, delirium, agitation.  Pt course complicated by anemia, fever, worsening PRABHU.  Palliative medicine consulted for assistance with goals of care.    Pt seen and examined by me with enhanced supervision aide at bedside, but no family present at bedside for followup of goals of care.  Pt is sleeping but wakes to my voice..  He is calm at time of my visit. Unable to obtain further hx due to dementia    Mittens were removed yest and pt has been relatively calm since, not attempting to remove Loredo    He has not received any Haldol in several days      PAIN: ( )Yes   (X )No  assessed visually as pt unable to provide    DYSPNEA: ( ) Yes  (X ) No  assessed visually as pt unable to provide    Review of Systems:    Anxiety-  Depression-  Physical Discomfort-   Dyspnea-  Constipation-  Diarrhea-  Nausea-  Vomiting-  Anorexia-  Weight Loss-   Cough-  Secretions-  Fatigue-  Weakness-  Delirium-     Unable to obtain due to: dementia      PHYSICAL EXAM:    Vital Signs Last 24 Hrs  T(C): 37.3 (03 Jan 2018 11:54), Max: 37.3 (03 Jan 2018 11:54)  T(F): 99.2 (03 Jan 2018 11:54), Max: 99.2 (03 Jan 2018 11:54)  HR: 76 (03 Jan 2018 11:54) (76 - 92)  BP: 126/71 (03 Jan 2018 11:54) (126/71 - 154/93)  RR: 16 (03 Jan 2018 11:54) (16 - 18)  SpO2: 98% (03 Jan 2018 11:54) (98% - 98%)      PPSV2:  30 %  FAST: 7B    General: sleeping, wakes to my voice, calm  Mental Status: awake, calm. does not follow commands  HEENT: EOMI, dry oral mucosa  Lungs: CTA b/l  Cardiac: S1S2+  GI: soft, + bowels sounds  : incontinent- currently loredo in place with urine output  Ext: moves all 4 exts spontaneously- not to command  Neuro: awake- unable to assess further due to dementia      LABS                          10.6   9.7   )-----------( 214      ( 03 Jan 2018 08:14 )             32.9     01-03    145  |  113<H>  |  39<H>  ----------------------------<  106<H>  4.4   |  22  |  2.16<H>    Ca    9.1      03 Jan 2018 08:14    RADIOLOGY/ADDITIONAL STUDIES:    EXAM:  CT ABDOMEN AND PELVIS                        PROCEDURE DATE:  12/28/2017    FINDINGS:    LOWER CHEST: Trace effusions and atelectasis    ABDOMEN:  LIVER: within normal limits  BILE DUCTS: normal caliber  GALLBLADDER: no calcified gallstones. normal caliber wall  PANCREAS: within normal limits  SPLEEN: within normal limits  ADRENALS: within normal limits  KIDNEYS: Moderate bilateral hydronephrosis,grossly stable.    PELVIS:  REPRODUCTIVE ORGANS: no pelvic masses  BLADDER: Thickened, contracted around Loredo catheter. Perivesical Fat   stranding, appears grossly similar to prior.    BOWEL: within normal limits  PERITONEUM: no ascites or free air, no fluid collection.  RETROPERITONEUM: no enlarged retroperitoneal or pelvic nodes. No   retroperitoneal or obvious soft tissue hemorrhage.    VESSELS: IVC filter.  ABDOMINAL WALL: within normal limits.  MUSCULOSKELETAL: Right hip arthroplasty. T12 vertebral compression   deformity.    EXAM:  US KIDNEYS AND BLADDER                        PROCEDURE DATE:  12/29/2017    FINDINGS:   CT dated 12/28/2017 available for review.    The right kidney measures 8.0 cm in length. Its contours are smooth.  No   focal lesion is found.  No calculi are seen. Moderate hydronephrosis and   proximal hydroureter is present.     The left kidney measures 9.8 cm in length. Its contours are smooth.  No   focal lesion is found.  No calculi are seen. Moderate hydronephrosis is   present.    The abdominal aorta measures normal caliber anterior to posterior.  The   IVC and retroperitoneal structures appear intact. Loredo catheter is seen   within the bladder.

## 2018-01-03 NOTE — PROGRESS NOTE ADULT - ASSESSMENT
82 with a history of CKD, CVA, dementia, HTN here with hypernatremia and PRABHU, renal evaluation. PRABHU multifactorial from dehydration but primary issue is obstructive uropathy.    PRABHU  -Renal function stable with loredo in, lytes ok  -Urology for long term plan  -Optimize oral intake as able    Hypernatremia  - Hypotonic IVF on going  -Oral intake as able now and with discharge to keep up with insensible or will be a recurrent issue    d/c with staff    Dr. Berman to cover on 1/4/17

## 2018-01-03 NOTE — PROGRESS NOTE ADULT - SUBJECTIVE AND OBJECTIVE BOX
HPI: 81yo M w/PMHx of HTN, Dementia, Major Depressive  Disorder, BPH BIBA from [facility] w/BUN/cr of 74/4.10.  From chart review, nursing home paperwork, and speaking to nursing home staff, pt's baseline mental status is disoriented and combative. For this reason, I was not able to attain a history from pt. I called [facility] for collateral information. Pt is full code per accompanying MOLST form. (25 Dec 2017 02:36)    SUBJECTIVE: Pt seen and examined at bedside. Pt does not follow commands. Poor historian. Unable to assess ROS.  Per RN pt, had a large BM yesterday.    REVIEW OF SYSTEMS:  Pt does not follow commands. Poor historian. Unable to assess ROS.    Vital Signs Last 24 Hrs  T(C): 37.3 (03 Jan 2018 11:54), Max: 37.3 (03 Jan 2018 11:54)  T(F): 99.2 (03 Jan 2018 11:54), Max: 99.2 (03 Jan 2018 11:54)  HR: 76 (03 Jan 2018 11:54) (76 - 92)  BP: 126/71 (03 Jan 2018 11:54) (126/71 - 154/93)  RR: 16 (03 Jan 2018 11:54) (16 - 18)  SpO2: 98% (03 Jan 2018 11:54) (98% - 98%)    I&O's Summary  02 Jan 2018 07:01  -  03 Jan 2018 07:00  --------------------------------------------------------  IN: 1942 mL / OUT: 2050 mL / NET: -108 mL    03 Jan 2018 07:01  -  03 Jan 2018 13:30  --------------------------------------------------------  IN: 971 mL / OUT: 0 mL / NET: 971 mL    PHYSICAL EXAM:  Constitutional: NAD, awake, well-developed  Respiratory: Breath sounds are clear bilaterally, No wheezing, rales or rhonchi  Cardiovascular: S1 and S2, regular rate and rhythm  Gastrointestinal: Bowel Sounds present, soft, nontender, nondistended, no guarding, no rebound  Extremities: No peripheral edema  Vascular: 2+ peripheral pulses  Neurological:  Does not respond to commands  Musculoskeletal: Does not respond to commands    MEDICATIONS  (STANDING):  cefuroxime   Tablet 250 milliGRAM(s) Oral every 12 hours  dextrose 5%. 500 milliLiter(s) (1000 mL/Hr) IV Continuous <Continuous>  finasteride 5 milliGRAM(s) Oral daily  heparin  Injectable 5000 Unit(s) SubCutaneous every 12 hours  naphazoline/pheniramine Solution 1 Drop(s) Both EYES two times a day  pantoprazole  Injectable 40 milliGRAM(s) IV Push daily  polyethylene glycol 3350 17 Gram(s) Oral two times a day  sodium chloride 0.45%. 1000 milliLiter(s) (70 mL/Hr) IV Continuous <Continuous>  tamsulosin 0.8 milliGRAM(s) Oral at bedtime      LABS: All Labs Reviewed:                    10.6   9.7   )-----------( 214      ( 03 Jan 2018 08:14 )             32.9     01-03    145  |  113<H>  |  39<H>  ----------------------------<  106<H>  4.4   |  22  |  2.16<H>    Ca    9.1      03 Jan 2018 08:14    Blood Culture: 12-30 @ 17:34  Organism --  Gram Stain Blood -- Gram Stain --  Specimen Source .Blood None  Culture-Blood --    RADIOLOGY/EKG:  US Kidney and Bladder (12.29.17 @ 08:36)   IMPRESSION: Moderate BILATERAL hydronephrosis and proximal RIGHT   hydroureter noted.

## 2018-01-05 LAB
CULTURE RESULTS: SIGNIFICANT CHANGE UP
CULTURE RESULTS: SIGNIFICANT CHANGE UP
SPECIMEN SOURCE: SIGNIFICANT CHANGE UP
SPECIMEN SOURCE: SIGNIFICANT CHANGE UP

## 2018-01-09 DIAGNOSIS — E43 UNSPECIFIED SEVERE PROTEIN-CALORIE MALNUTRITION: ICD-10-CM

## 2018-01-09 DIAGNOSIS — N13.8 OTHER OBSTRUCTIVE AND REFLUX UROPATHY: ICD-10-CM

## 2018-01-09 DIAGNOSIS — N17.9 ACUTE KIDNEY FAILURE, UNSPECIFIED: ICD-10-CM

## 2018-01-09 DIAGNOSIS — N13.30 UNSPECIFIED HYDRONEPHROSIS: ICD-10-CM

## 2018-01-09 DIAGNOSIS — H35.30 UNSPECIFIED MACULAR DEGENERATION: ICD-10-CM

## 2018-01-09 DIAGNOSIS — A49.8 OTHER BACTERIAL INFECTIONS OF UNSPECIFIED SITE: ICD-10-CM

## 2018-01-09 DIAGNOSIS — F05 DELIRIUM DUE TO KNOWN PHYSIOLOGICAL CONDITION: ICD-10-CM

## 2018-01-09 DIAGNOSIS — F03.91 UNSPECIFIED DEMENTIA WITH BEHAVIORAL DISTURBANCE: ICD-10-CM

## 2018-01-09 DIAGNOSIS — R78.81 BACTEREMIA: ICD-10-CM

## 2018-01-09 DIAGNOSIS — F32.9 MAJOR DEPRESSIVE DISORDER, SINGLE EPISODE, UNSPECIFIED: ICD-10-CM

## 2018-01-09 DIAGNOSIS — D64.9 ANEMIA, UNSPECIFIED: ICD-10-CM

## 2018-01-09 DIAGNOSIS — R33.8 OTHER RETENTION OF URINE: ICD-10-CM

## 2018-01-09 DIAGNOSIS — E86.0 DEHYDRATION: ICD-10-CM

## 2018-01-09 DIAGNOSIS — E87.0 HYPEROSMOLALITY AND HYPERNATREMIA: ICD-10-CM

## 2018-01-09 DIAGNOSIS — N40.1 BENIGN PROSTATIC HYPERPLASIA WITH LOWER URINARY TRACT SYMPTOMS: ICD-10-CM

## 2018-01-09 DIAGNOSIS — N18.5 CHRONIC KIDNEY DISEASE, STAGE 5: ICD-10-CM

## 2018-01-09 DIAGNOSIS — I12.0 HYPERTENSIVE CHRONIC KIDNEY DISEASE WITH STAGE 5 CHRONIC KIDNEY DISEASE OR END STAGE RENAL DISEASE: ICD-10-CM

## 2018-01-15 ENCOUNTER — INPATIENT (INPATIENT)
Facility: HOSPITAL | Age: 83
LOS: 9 days | Discharge: NURSING, REHAB OR LONG-TERM CARE | End: 2018-01-25
Attending: INTERNAL MEDICINE | Admitting: INTERNAL MEDICINE
Payer: MEDICARE

## 2018-01-15 VITALS — TEMPERATURE: 98 F | SYSTOLIC BLOOD PRESSURE: 148 MMHG | DIASTOLIC BLOOD PRESSURE: 84 MMHG

## 2018-01-15 LAB
ALBUMIN SERPL ELPH-MCNC: 2.2 G/DL — LOW (ref 3.3–5)
ALP SERPL-CCNC: 195 U/L — HIGH (ref 40–120)
ALT FLD-CCNC: 28 U/L — SIGNIFICANT CHANGE UP (ref 12–78)
ANION GAP SERPL CALC-SCNC: 11 MMOL/L — SIGNIFICANT CHANGE UP (ref 5–17)
ANION GAP SERPL CALC-SCNC: 8 MMOL/L — SIGNIFICANT CHANGE UP (ref 5–17)
APPEARANCE UR: (no result)
APTT BLD: 30.1 SEC — SIGNIFICANT CHANGE UP (ref 27.5–37.4)
AST SERPL-CCNC: 12 U/L — LOW (ref 15–37)
BACTERIA # UR AUTO: (no result)
BASOPHILS # BLD AUTO: 0.1 K/UL — SIGNIFICANT CHANGE UP (ref 0–0.2)
BASOPHILS NFR BLD AUTO: 0.6 % — SIGNIFICANT CHANGE UP (ref 0–2)
BILIRUB SERPL-MCNC: 0.4 MG/DL — SIGNIFICANT CHANGE UP (ref 0.2–1.2)
BILIRUB UR-MCNC: NEGATIVE — SIGNIFICANT CHANGE UP
BUN SERPL-MCNC: 89 MG/DL — HIGH (ref 7–23)
BUN SERPL-MCNC: 95 MG/DL — HIGH (ref 7–23)
CALCIUM SERPL-MCNC: 8.9 MG/DL — SIGNIFICANT CHANGE UP (ref 8.5–10.1)
CALCIUM SERPL-MCNC: 9.3 MG/DL — SIGNIFICANT CHANGE UP (ref 8.5–10.1)
CHLORIDE SERPL-SCNC: 124 MMOL/L — HIGH (ref 96–108)
CHLORIDE SERPL-SCNC: 129 MMOL/L — HIGH (ref 96–108)
CK SERPL-CCNC: 48 U/L — SIGNIFICANT CHANGE UP (ref 26–308)
CO2 SERPL-SCNC: 23 MMOL/L — SIGNIFICANT CHANGE UP (ref 22–31)
CO2 SERPL-SCNC: 24 MMOL/L — SIGNIFICANT CHANGE UP (ref 22–31)
COLOR SPEC: YELLOW — SIGNIFICANT CHANGE UP
CREAT SERPL-MCNC: 4.08 MG/DL — HIGH (ref 0.5–1.3)
CREAT SERPL-MCNC: 4.72 MG/DL — HIGH (ref 0.5–1.3)
DIFF PNL FLD: (no result)
EOSINOPHIL # BLD AUTO: 0.1 K/UL — SIGNIFICANT CHANGE UP (ref 0–0.5)
EOSINOPHIL NFR BLD AUTO: 0.9 % — SIGNIFICANT CHANGE UP (ref 0–6)
EPI CELLS # UR: SIGNIFICANT CHANGE UP
GLUCOSE SERPL-MCNC: 170 MG/DL — HIGH (ref 70–99)
GLUCOSE SERPL-MCNC: 177 MG/DL — HIGH (ref 70–99)
GLUCOSE UR QL: NEGATIVE MG/DL — SIGNIFICANT CHANGE UP
HCT VFR BLD CALC: 32.6 % — LOW (ref 39–50)
HGB BLD-MCNC: 10.1 G/DL — LOW (ref 13–17)
INR BLD: 1.24 RATIO — HIGH (ref 0.88–1.16)
KETONES UR-MCNC: (no result)
LACTATE SERPL-SCNC: 1 MMOL/L — SIGNIFICANT CHANGE UP (ref 0.7–2)
LACTATE SERPL-SCNC: 4.5 MMOL/L — CRITICAL HIGH (ref 0.7–2)
LEUKOCYTE ESTERASE UR-ACNC: (no result)
LYMPHOCYTES # BLD AUTO: 0.9 K/UL — LOW (ref 1–3.3)
LYMPHOCYTES # BLD AUTO: 8.9 % — LOW (ref 13–44)
MAGNESIUM SERPL-MCNC: 2.4 MG/DL — SIGNIFICANT CHANGE UP (ref 1.6–2.6)
MAGNESIUM SERPL-MCNC: 2.7 MG/DL — HIGH (ref 1.6–2.6)
MCHC RBC-ENTMCNC: 28.2 PG — SIGNIFICANT CHANGE UP (ref 27–34)
MCHC RBC-ENTMCNC: 30.9 GM/DL — LOW (ref 32–36)
MCV RBC AUTO: 91.1 FL — SIGNIFICANT CHANGE UP (ref 80–100)
MONOCYTES # BLD AUTO: 0.8 K/UL — SIGNIFICANT CHANGE UP (ref 0–0.9)
MONOCYTES NFR BLD AUTO: 8 % — SIGNIFICANT CHANGE UP (ref 2–14)
NEUTROPHILS # BLD AUTO: 8.4 K/UL — HIGH (ref 1.8–7.4)
NEUTROPHILS NFR BLD AUTO: 81.7 % — HIGH (ref 43–77)
NITRITE UR-MCNC: NEGATIVE — SIGNIFICANT CHANGE UP
PH UR: 6 — SIGNIFICANT CHANGE UP (ref 5–8)
PLATELET # BLD AUTO: 188 K/UL — SIGNIFICANT CHANGE UP (ref 150–400)
POTASSIUM SERPL-MCNC: 5 MMOL/L — SIGNIFICANT CHANGE UP (ref 3.5–5.3)
POTASSIUM SERPL-MCNC: 5 MMOL/L — SIGNIFICANT CHANGE UP (ref 3.5–5.3)
POTASSIUM SERPL-SCNC: 5 MMOL/L — SIGNIFICANT CHANGE UP (ref 3.5–5.3)
POTASSIUM SERPL-SCNC: 5 MMOL/L — SIGNIFICANT CHANGE UP (ref 3.5–5.3)
PROT SERPL-MCNC: 8.3 GM/DL — SIGNIFICANT CHANGE UP (ref 6–8.3)
PROT UR-MCNC: 100 MG/DL
PROTHROM AB SERPL-ACNC: 13.5 SEC — HIGH (ref 9.8–12.7)
RBC # BLD: 3.58 M/UL — LOW (ref 4.2–5.8)
RBC # FLD: 13.6 % — SIGNIFICANT CHANGE UP (ref 10.3–14.5)
RBC CASTS # UR COMP ASSIST: (no result) /HPF (ref 0–4)
SODIUM SERPL-SCNC: 159 MMOL/L — HIGH (ref 135–145)
SODIUM SERPL-SCNC: 160 MMOL/L — CRITICAL HIGH (ref 135–145)
SP GR SPEC: 1.02 — SIGNIFICANT CHANGE UP (ref 1.01–1.02)
TROPONIN I SERPL-MCNC: 0.14 NG/ML — HIGH (ref 0.01–0.04)
TROPONIN I SERPL-MCNC: 0.19 NG/ML — HIGH (ref 0.01–0.04)
UROBILINOGEN FLD QL: NEGATIVE MG/DL — SIGNIFICANT CHANGE UP
WBC # BLD: 10.2 K/UL — SIGNIFICANT CHANGE UP (ref 3.8–10.5)
WBC # FLD AUTO: 10.2 K/UL — SIGNIFICANT CHANGE UP (ref 3.8–10.5)
WBC UR QL: >50

## 2018-01-15 PROCEDURE — 71045 X-RAY EXAM CHEST 1 VIEW: CPT | Mod: 26

## 2018-01-15 PROCEDURE — 71250 CT THORAX DX C-: CPT | Mod: 26

## 2018-01-15 PROCEDURE — 93010 ELECTROCARDIOGRAM REPORT: CPT

## 2018-01-15 PROCEDURE — 99285 EMERGENCY DEPT VISIT HI MDM: CPT

## 2018-01-15 PROCEDURE — 74176 CT ABD & PELVIS W/O CONTRAST: CPT | Mod: 26

## 2018-01-15 RX ORDER — SODIUM CHLORIDE 9 MG/ML
1000 INJECTION INTRAMUSCULAR; INTRAVENOUS; SUBCUTANEOUS ONCE
Qty: 0 | Refills: 0 | Status: COMPLETED | OUTPATIENT
Start: 2018-01-15 | End: 2018-01-15

## 2018-01-15 RX ORDER — TAMSULOSIN HYDROCHLORIDE 0.4 MG/1
0.4 CAPSULE ORAL AT BEDTIME
Qty: 0 | Refills: 0 | Status: DISCONTINUED | OUTPATIENT
Start: 2018-01-15 | End: 2018-01-25

## 2018-01-15 RX ORDER — ASPIRIN/CALCIUM CARB/MAGNESIUM 324 MG
300 TABLET ORAL ONCE
Qty: 0 | Refills: 0 | Status: COMPLETED | OUTPATIENT
Start: 2018-01-15 | End: 2018-01-15

## 2018-01-15 RX ORDER — FINASTERIDE 5 MG/1
5 TABLET, FILM COATED ORAL DAILY
Qty: 0 | Refills: 0 | Status: DISCONTINUED | OUTPATIENT
Start: 2018-01-15 | End: 2018-01-25

## 2018-01-15 RX ORDER — CEFTRIAXONE 500 MG/1
1 INJECTION, POWDER, FOR SOLUTION INTRAMUSCULAR; INTRAVENOUS ONCE
Qty: 0 | Refills: 0 | Status: DISCONTINUED | OUTPATIENT
Start: 2018-01-15 | End: 2018-01-15

## 2018-01-15 RX ORDER — PIPERACILLIN AND TAZOBACTAM 4; .5 G/20ML; G/20ML
3.38 INJECTION, POWDER, LYOPHILIZED, FOR SOLUTION INTRAVENOUS EVERY 12 HOURS
Qty: 0 | Refills: 0 | Status: DISCONTINUED | OUTPATIENT
Start: 2018-01-15 | End: 2018-01-16

## 2018-01-15 RX ORDER — CEFTRIAXONE 500 MG/1
1000 INJECTION, POWDER, FOR SOLUTION INTRAMUSCULAR; INTRAVENOUS ONCE
Qty: 0 | Refills: 0 | Status: COMPLETED | OUTPATIENT
Start: 2018-01-15 | End: 2018-01-15

## 2018-01-15 RX ORDER — ASPIRIN/CALCIUM CARB/MAGNESIUM 324 MG
325 TABLET ORAL ONCE
Qty: 0 | Refills: 0 | Status: DISCONTINUED | OUTPATIENT
Start: 2018-01-15 | End: 2018-01-15

## 2018-01-15 RX ORDER — SODIUM CHLORIDE 9 MG/ML
1000 INJECTION, SOLUTION INTRAVENOUS
Qty: 0 | Refills: 0 | Status: DISCONTINUED | OUTPATIENT
Start: 2018-01-15 | End: 2018-01-18

## 2018-01-15 RX ORDER — HEPARIN SODIUM 5000 [USP'U]/ML
5000 INJECTION INTRAVENOUS; SUBCUTANEOUS EVERY 8 HOURS
Qty: 0 | Refills: 0 | Status: DISCONTINUED | OUTPATIENT
Start: 2018-01-15 | End: 2018-01-25

## 2018-01-15 RX ORDER — VANCOMYCIN HCL 1 G
1000 VIAL (EA) INTRAVENOUS ONCE
Qty: 0 | Refills: 0 | Status: COMPLETED | OUTPATIENT
Start: 2018-01-15 | End: 2018-01-15

## 2018-01-15 RX ORDER — ASPIRIN/CALCIUM CARB/MAGNESIUM 324 MG
81 TABLET ORAL DAILY
Qty: 0 | Refills: 0 | Status: DISCONTINUED | OUTPATIENT
Start: 2018-01-15 | End: 2018-01-25

## 2018-01-15 RX ADMIN — Medication 250 MILLIGRAM(S): at 22:40

## 2018-01-15 RX ADMIN — Medication 300 MILLIGRAM(S): at 18:42

## 2018-01-15 RX ADMIN — CEFTRIAXONE 1000 MILLIGRAM(S): 500 INJECTION, POWDER, FOR SOLUTION INTRAMUSCULAR; INTRAVENOUS at 18:36

## 2018-01-15 RX ADMIN — SODIUM CHLORIDE 1000 MILLILITER(S): 9 INJECTION INTRAMUSCULAR; INTRAVENOUS; SUBCUTANEOUS at 17:25

## 2018-01-15 RX ADMIN — SODIUM CHLORIDE 1000 MILLILITER(S): 9 INJECTION INTRAMUSCULAR; INTRAVENOUS; SUBCUTANEOUS at 18:26

## 2018-01-15 NOTE — ED PROVIDER NOTE - GASTROINTESTINAL, MLM
Abdomen soft, non-tender, no guarding. +abd umbilical hernia Abdomen soft, non-tender, no guarding. +abd- umbilical hernia

## 2018-01-15 NOTE — ED PROVIDER NOTE - NS ED ATTENDING STATEMENT MOD
After Visit Summary   4/6/2017    Chrissy Looney    MRN: [id number]           Patient Information     Date Of Birth          1961        Visit Information        Provider Department      4/6/2017 9:20 AM Olga Koenig APRN CNP [facility]        Today's Diagnoses     Right ear pain    -  1      Care Instructions    Here is the plan from today's visit    1. Right ear pain  - amoxicillin (AMOXIL) 500 MG capsule; Take 1 capsule (500 mg) by mouth 3 times daily for 10 days  Dispense: 30 capsule; Refill: 0      Follow-up with no improvement or worsening of symptoms.     Thank you for coming to [facility] today.  Lab Testing:  **If you had lab testing today and your results are reassuring or normal they will be mailed to you or sent through [employer] within 7 days.   **If the lab tests need quick action we will call you with the results.  The phone number we will call with results is # [phone] (home) . If this is not the best number please call our clinic and change the number.  Medication Refills:  If you need any refills please call your pharmacy and they will contact us.   If you need to  your refill at a new pharmacy, please contact the new pharmacy directly. The new pharmacy will help you get your medications transferred faster.   Scheduling:  If you have any concerns about today's visit or wish to schedule another appointment please call our office during normal business hours [phone] (8-5:00 M-F)  If a referral was made to a [facility] and you don't get a call from central scheduling please call [phone].  If a Mammogram was ordered for you at [facility] call [phone] to schedule or change your appointment.  If you had an XRay/CT/Ultrasound/MRI ordered the number is [phone] to schedule or change your radiology appointment.   Medical Concerns:  If you have urgent medical concerns please call [phone] at any  time of the day.  If you have a medical emergency please call 911.          Follow-ups after your visit        Who to contact     Please call your clinic at [phone] to:    Ask questions about your health    Make or cancel appointments    Discuss your medicines    Learn about your test results    Speak to your doctor   If you have compliments or concerns about an experience at your clinic, or if you wish to file a complaint, please contact [facility] Patient Relations at [phone] or email us at [email or web address]         Additional Information About Your Visit        [employer] Information     [employer] is an electronic gateway that provides easy, online access to your medical records. With [employer], you can request a clinic appointment, read your test results, renew a prescription or communicate with your care team.     To sign up for [employer] visit the website at [email or web address]   You will be asked to enter the access code listed below, as well as some personal information. Please follow the directions to create your username and password.     Your access code is: [email or web address]  Expires: 2017  9:41 AM     Your access code will  in 90 days. If you need help or a new code, please contact your [facility] or call [phone] for assistance.        Care EveryWhere ID     This is your Care EveryWhere ID. This could be used by other organizations to access your [facility] medical records  [id number]        Your Vitals Were     Pulse Temperature Respirations Last Period Pulse Oximetry BMI (Body Mass Index)    92 98  F (36.7  C) (Oral) 16 2013 96% 32.31 kg/m2       Blood Pressure from Last 3 Encounters:   17 112/76   17 149/89   03/15/17 134/86    Weight from Last 3 Encounters:   17 171 lb (77.6 kg)   17 168 lb 12.8 oz (76.6 kg)   03/15/17 169 lb 6.4 oz (76.8 kg)              Today, you had the  following     No orders found for display         Today's Medication Changes          These changes are accurate as of: 4/6/17  9:50 AM.  If you have any questions, ask your nurse or doctor.               Start taking these medicines.        Dose/Directions    amoxicillin 500 MG capsule   Commonly known as:  AMOXIL   Used for:  Right ear pain   Started by:  Olga Koenig APRN CNP        Dose:  500 mg   Take 1 capsule (500 mg) by mouth 3 times daily for 10 days   Quantity:  30 capsule   Refills:  0            Where to get your medicines      These medications were sent to [facility] [place], [place] - [address]   [address] [facility] [address]     Phone:  [phone]     amoxicillin 500 MG capsule                Primary Care Provider Office Phone # Fax #    MAE Mireles [phone]       [facility] [address] [facility] [address]        Thank you!     Thank you for choosing [facility]  for your care. Our goal is always to provide you with excellent care. Hearing back from our patients is one way we can continue to improve our services. Please take a few minutes to complete the written survey that you may receive in the mail after your visit with us. Thank you!             Your Updated Medication List - Protect others around you: Learn how to safely use, store and throw away your medicines at www.disposemymeds.org.          This list is accurate as of: 4/6/17  9:50 AM.  Always use your most recent med list.                   Brand Name Dispense Instructions for use    ACETAMINOPHEN 8 HOUR 650 MG 8 hour tablet   Generic drug:  acetaminophen     100 tablet    Take 1 tablet by mouth every 8 hours as needed       amoxicillin 500 MG capsule    AMOXIL    30 capsule    Take 1 capsule (500 mg) by mouth 3 times daily for 10 days       capsaicin 0.075 % cream    ZOSTRIX    60 g    Apply topically 3 times daily as needed        cetirizine 10 MG tablet    zyrTEC    90 tablet    Take 1 tablet (10 mg) by mouth daily       conjugated estrogens cream    PREMARIN    30 g    Place 0.5 g vaginally twice a week       cyclobenzaprine 5 MG tablet    FLEXERIL    20 tablet    Take 0.5-1 tablets (2.5-5 mg) by mouth nightly as needed for muscle spasms       fluticasone 50 MCG/ACT spray    FLONASE    16 g    Spray 1-2 sprays into both nostrils daily       lisinopril-hydrochlorothiazide 20-12.5 MG per tablet    PRINZIDE/ZESTORETIC    90 tablet    Take 1 tablet by mouth daily       naproxen 500 MG tablet    NAPROSYN    30 tablet    Take 1 tablet (500 mg) by mouth 2 times daily (with meals)       pseudoePHEDrine 30 MG tablet    SUDAFED    28 tablet    Take 2 tablets (60 mg) by mouth every 6 hours as needed for congestion          Attending Only

## 2018-01-15 NOTE — ED PROVIDER NOTE - CONSTITUTIONAL, MLM
normal... ill appearing ,non-verbal , awake, alert, oriented to person, place, time/situation and in no apparent distress. ill appearing ,non-verbal , awake, alert; mild agitated distress;

## 2018-01-15 NOTE — ED PROVIDER NOTE - MEDICAL DECISION MAKING DETAILS
82 y/o male sent from nursing home for renal failure plan for EKG, CXR, labs, U/A, admit. 82 y/o male sent from nursing home for renal failure; concern for dehydration; EKG/CXR; labs; UA; admit.

## 2018-01-15 NOTE — ED ADULT NURSE NOTE - OBJECTIVE STATEMENT
Pt BIBA from [facility] for increasing BUN/Cr.  Pt with chronic loredo in place. Pt agitated/combative at baseline as per facility.

## 2018-01-15 NOTE — ED PROVIDER NOTE - PSYCHIATRIC, MLM
Alert and oriented to person, place, time/situation. normal mood and affect. no apparent risk to self or others. Awake, alert; moving all extremities;

## 2018-01-15 NOTE — ED PROVIDER NOTE - ENMT, MLM
Airway patent, Nasal mucosa clear. Mouth with normal mucosa. Mucus membranes dry. Lips chapped. Throat has no vesicles, no oropharyngeal exudates and uvula is midline.

## 2018-01-15 NOTE — H&P ADULT - NSHPLABSRESULTS_GEN_ALL_CORE
10.1   10.2  )-----------( 188      ( 15 Yonny 2018 17:21 )             32.6       CBC Full  -  ( 15 Yonny 2018 17:21 )  WBC Count : 10.2 K/uL  Hemoglobin : 10.1 g/dL  Hematocrit : 32.6 %  Platelet Count - Automated : 188 K/uL  Mean Cell Volume : 91.1 fl  Mean Cell Hemoglobin : 28.2 pg  Mean Cell Hemoglobin Concentration : 30.9 gm/dL  Auto Neutrophil # : 8.4 K/uL  Auto Lymphocyte # : 0.9 K/uL  Auto Monocyte # : 0.8 K/uL  Auto Eosinophil # : 0.1 K/uL  Auto Basophil # : 0.1 K/uL  Auto Neutrophil % : 81.7 %  Auto Lymphocyte % : 8.9 %  Auto Monocyte % : 8.0 %  Auto Eosinophil % : 0.9 %  Auto Basophil % : 0.6 %      01-15    159<H>  |  124<H>  |  95<H>  ----------------------------<  177<H>  5.0   |  24  |  4.72<H>    Ca    9.3      15 Yonny 2018 17:21  Mg     2.7     01-15    TPro  8.3  /  Alb  2.2<L>  /  TBili  0.4  /  DBili  x   /  AST  12<L>  /  ALT  28  /  AlkPhos  195<H>  15      LIVER FUNCTIONS - ( 15 Yonny 2018 17:21 )  Alb: 2.2 g/dL / Pro: 8.3 gm/dL / ALK PHOS: 195 U/L / ALT: 28 U/L / AST: 12 U/L / GGT: x             PT/INR - ( 15 Yonny 2018 17:21 )   PT: 13.5 sec;   INR: 1.24 ratio         PTT - ( 15 Yonny 2018 17:21 )  PTT:30.1 sec    CARDIAC MARKERS ( 15 Yonny 2018 17:21 )  0.188 ng/mL / x     / x     / x     / x            Urinalysis Basic - ( 15 Yonny 2018 17:21 )    Color: Yellow / Appearance: very cloudy / S.025 / pH: x  Gluc: x / Ketone: Trace  / Bili: Negative / Urobili: Negative mg/dL   Blood: x / Protein: 100 mg/dL / Nitrite: Negative   Leuk Esterase: Moderate / RBC: 25-50 /HPF / WBC >50   Sq Epi: x / Non Sq Epi: Occasional / Bacteria: Moderate            MEDICATIONS  (STANDING):

## 2018-01-15 NOTE — ED PROVIDER NOTE - OBJECTIVE STATEMENT
84 y/o male PMHx of Dementia BIBEMS from [facility] presents to the ED for elevated labs. Pt is moaning, moving all extremities. Per paperwork: baseline dementia, disoriented and non ambulatory. Labs reviewed from paperwork: January 8th creatine - 2.8   January 12th - 3.24 and Today Creatine- 4.38. 84 y/o male PMHx of Dementia BIBEMS from [facility] presents to the ED for abnormal labs; Pt is moaning, moving all extremities. Per paperwork: baseline dementia, disoriented and non ambulatory. Labs reviewed from paperwork: January 8th 2018 Cr - 2.8   January 12th 2018 Cr - 3.24 and Today- Cr: 4.38; patient offers no complaints at this time; admitted recently for acute renal failure and UTI.

## 2018-01-15 NOTE — H&P ADULT - HISTORY OF PRESENT ILLNESS
81yo M w/PMHx of HTN, Dementia, Major Depressive  Disorder, BPH BIBA from [facility]   From chart review, nursing home paperwork, and speaking to nursing home staff, pt's baseline mental status is disoriented and combative. For this reason, I was not able to attain a history from pt. I called [facility] for collateral information. 82yo M w/PMHx of HTN, Dementia, Major Depressive  Disorder, BPH BIBA from [facility]   From chart review, nursing home paperwork, and speaking to nursing home staff, pt's baseline mental status is disoriented and combative. For this reason, I was not able to attain a history from pt. I called [facility] for collateral information.     82 y/o male PMHx of Dementia BIBEMS from [facility] presents to the ED for abnormal labs; Pt is moaning, moving all extremities. Per paperwork: baseline dementia, disoriented and non ambulatory. Labs reviewed from paperwork: January 8th 2018 Cr - 2.8   January 12th 2018 Cr - 3.24 and Today- Cr: 4.38; patient offers no complaints at this time; admitted recently for acute renal failure and UT 82yo M w/PMHx of HTN, Dementia, Major Depressive  Disorder, BPH with indwelling loredo  BIBA from [facility] for abnormal labs .   Per paperwork: baseline dementia, disoriented and non ambulatory at baseline . Labs reviewed from paperwork: January 8th 2018 Cr - 2.8   January 12th 2018 Cr - 3.24 and Today- Cr: 4.38; Unable to obtain history from patient due to demntia.patient opens eyes to verbal stimulus,does not make eye contact,nonverbal during my exam,moves all extremities spontaneously, does not follow commands  ; admitted dec 2017  for acute renal failure from obstructive uropathy and dehydration  and UTi with blood cx positive for proteus .completed course of ceftin in rehab    In Ed Tmax 99 F, , /73, pulse ox 96% RA  Patient was found to have PRABHU , K5, Mg 2.7, Na 159, lactate 4.5,   troponin 0.188, EKG sinus tachycardia 109bpm, ST depression in anterior leads- anterior subendocardial injury unchanged and no significant changes from EKG done 12/26/17   CXR chronic small left pleural effusion vs chronic left atelectasis unchanged from CXr done 12/27/17    in Ed patient received 2 liters NS, ceftriaxone, asa suppository  loredo replaced in ED making urine output 1000cc in ED per RN after new loredo placed     family not present at bedside

## 2018-01-15 NOTE — H&P ADULT - NSHPPHYSICALEXAM_GEN_ALL_CORE
PHYSICAL EXAM:    Daily Height in cm: 172.72 (15 Yonny 2018 18:27)    Daily     ICU Vital Signs Last 24 Hrs  T(C): 37.3 (15 Yonny 2018 20:07), Max: 37.3 (15 Yonny 2018 20:07)  T(F): 99.1 (15 Yonny 2018 20:07), Max: 99.1 (15 Yonny 2018 20:07)  HR: 101 (15 Yonny 2018 20:07) (101 - 101)  BP: 121/73 (15 Yonny 2018 20:07) (121/73 - 148/84)  BP(mean): --  ABP: --  ABP(mean): --  RR: 17 (15 Yonny 2018 20:07) (17 - 17)  SpO2: 96% (15 Yonny 2018 20:07) (96% - 96%)      Constitutional: awake, NAD,opens eyes to verbal stimulus ,does not make eye contact,nonverbal during my exam, moves all extremities spontaneously, does not follow commands   HEENT: Atraumatic, MADELAINE, Normal, No congestion  Respiratory: fine inspiratory carckles to bilateral lung bases, no rhonchi/wheeze  Cardiovascular: N S1S2; DANIEL present  Gastrointestinal: Abdomen soft, patient does not exhibit signs of discomfort on abdominal palpation , Bowel Ssounds present  Extremities: No edema, peripheral pulses present  Neurological: awake, NAD,opens eyes to verbal stimulus ,does not make eye contact,nonverbal during my exam, moves all extremities spontaneously, does not follow commands   Skin: Non cellulitic,     Back: No CVA tenderness   Musculoskeletal: non tender  Breasts: Deferred  Genitourinary: deferred  Rectal: Deferred

## 2018-01-15 NOTE — H&P ADULT - ASSESSMENT
A/P  # PRABHU  with Hypermagnesemia and Hypernatremia secondary to dehydration vs obstructive uropathy(hx BPH)  - Admit to medicine  -s/p 2 L NS bolus , repeat BMP stat  -continue IVf with D5W 100cc/hr, recheck BMP in AM  -continue loredo ,I/O monitor  -nephrology consult   -Ct abd/pelvis r/o hydronephrosis  -if no hydronephrosis may f/u with urology as outpatient     #Elevated lactate without metabolic acidosis-Sepsis vs dehydration R/O UTI  - IVF, serial lactate, zosyn IV  , Iv vancox1  -will do Ct abd/pelvis/chest r/o source of  infection  - f/u blood cx, urine cx  -ID consult    #Elevated troponin without significant EKG changes -likely demand ischemia   -admitted to tele  - asa, lipid profile, SARA, echo,   - cardio consult    #chronic anemia stable  -monitor h/H    #hx HTN currently stable  - in lieu of sepsis will put BP parameters for flomax    #hx dementia  - consider palliative care consult after discussing with family in am    # DVT prophylaxis   heparin SC A/P  # PRABHU  with Hypermagnesemia and Hypernatremia secondary to dehydration vs obstructive uropathy(hx BPH)  - Admit to medicine  -s/p 2 L NS bolus , repeat BMP stat  -continue IVf with D5W 100cc/hr, recheck BMP in AM  -continue loredo ,I/O monitor  -nephrology consult   -Ct abd/pelvis r/o hydronephrosis  -if no hydronephrosis may f/u with urology as outpatient     #Elevated lactate without metabolic acidosis-Sepsis vs dehydration R/O UTI  - IVF, serial lactate, zosyn IV  , Iv vancox1  -will do Ct abd/pelvis/chest r/o source of  infection  - f/u blood cx, urine cx  -ID consult    #Elevated troponin without significant EKG changes -likely demand ischemia   -admitted to tele  - asa, lipid profile, SARA, echo,   - cardio consult    #chronic anemia stable  -monitor h/H    #hx HTN currently stable  - in lieu of sepsis will put BP parameters for flomax    #hx dementia  - consider palliative care consult after discussing with family in am    # DVT prophylaxis   heparin SC    Addendum- Ct abd/pelvis scan follow up  #soft tissue gas and inflammation to scrotum and base of penis DDx includes urethral perforation,   # B/l severe hydronephrosis down to bladder with thickened urinary bladder wall   # currently patient hemodynamically stable  -will give 1 dose of meropenem to cover for complicated UTI, s/p Iv vanco x1  -discussed above findings with dr cheung who will see patient in am  -constant observation to prevent patient from pulling on the loredo catheter

## 2018-01-16 DIAGNOSIS — N30.90 CYSTITIS, UNSPECIFIED WITHOUT HEMATURIA: ICD-10-CM

## 2018-01-16 DIAGNOSIS — R74.8 ABNORMAL LEVELS OF OTHER SERUM ENZYMES: ICD-10-CM

## 2018-01-16 LAB
ALBUMIN SERPL ELPH-MCNC: 1.8 G/DL — LOW (ref 3.3–5)
ALP SERPL-CCNC: 160 U/L — HIGH (ref 40–120)
ALT FLD-CCNC: 20 U/L — SIGNIFICANT CHANGE UP (ref 12–78)
ANION GAP SERPL CALC-SCNC: 8 MMOL/L — SIGNIFICANT CHANGE UP (ref 5–17)
AST SERPL-CCNC: 6 U/L — LOW (ref 15–37)
BASOPHILS # BLD AUTO: 0 K/UL — SIGNIFICANT CHANGE UP (ref 0–0.2)
BASOPHILS NFR BLD AUTO: 0.4 % — SIGNIFICANT CHANGE UP (ref 0–2)
BILIRUB SERPL-MCNC: 0.3 MG/DL — SIGNIFICANT CHANGE UP (ref 0.2–1.2)
BUN SERPL-MCNC: 86 MG/DL — HIGH (ref 7–23)
CALCIUM SERPL-MCNC: 8.7 MG/DL — SIGNIFICANT CHANGE UP (ref 8.5–10.1)
CHLORIDE SERPL-SCNC: 126 MMOL/L — HIGH (ref 96–108)
CHOLEST SERPL-MCNC: 122 MG/DL — SIGNIFICANT CHANGE UP (ref 10–199)
CK SERPL-CCNC: 45 U/L — SIGNIFICANT CHANGE UP (ref 26–308)
CO2 SERPL-SCNC: 23 MMOL/L — SIGNIFICANT CHANGE UP (ref 22–31)
CREAT SERPL-MCNC: 3.82 MG/DL — HIGH (ref 0.5–1.3)
EOSINOPHIL # BLD AUTO: 0.1 K/UL — SIGNIFICANT CHANGE UP (ref 0–0.5)
EOSINOPHIL NFR BLD AUTO: 1 % — SIGNIFICANT CHANGE UP (ref 0–6)
GLUCOSE BLDC GLUCOMTR-MCNC: 211 MG/DL — HIGH (ref 70–99)
GLUCOSE BLDC GLUCOMTR-MCNC: 214 MG/DL — HIGH (ref 70–99)
GLUCOSE SERPL-MCNC: 239 MG/DL — HIGH (ref 70–99)
GRAM STN FLD: SIGNIFICANT CHANGE UP
HCT VFR BLD CALC: 27.8 % — LOW (ref 39–50)
HDLC SERPL-MCNC: 6 MG/DL — LOW (ref 40–125)
HGB BLD-MCNC: 8.7 G/DL — LOW (ref 13–17)
LACTATE SERPL-SCNC: 1 MMOL/L — SIGNIFICANT CHANGE UP (ref 0.7–2)
LIPID PNL WITH DIRECT LDL SERPL: 53 MG/DL — SIGNIFICANT CHANGE UP
LYMPHOCYTES # BLD AUTO: 0.9 K/UL — LOW (ref 1–3.3)
LYMPHOCYTES # BLD AUTO: 8.9 % — LOW (ref 13–44)
MAGNESIUM SERPL-MCNC: 2.4 MG/DL — SIGNIFICANT CHANGE UP (ref 1.6–2.6)
MCHC RBC-ENTMCNC: 28.4 PG — SIGNIFICANT CHANGE UP (ref 27–34)
MCHC RBC-ENTMCNC: 31.2 GM/DL — LOW (ref 32–36)
MCV RBC AUTO: 91.1 FL — SIGNIFICANT CHANGE UP (ref 80–100)
MONOCYTES # BLD AUTO: 0.7 K/UL — SIGNIFICANT CHANGE UP (ref 0–0.9)
MONOCYTES NFR BLD AUTO: 6.8 % — SIGNIFICANT CHANGE UP (ref 2–14)
NEUTROPHILS # BLD AUTO: 8.4 K/UL — HIGH (ref 1.8–7.4)
NEUTROPHILS NFR BLD AUTO: 82.9 % — HIGH (ref 43–77)
PHOSPHATE SERPL-MCNC: 3.6 MG/DL — SIGNIFICANT CHANGE UP (ref 2.5–4.5)
PLATELET # BLD AUTO: 157 K/UL — SIGNIFICANT CHANGE UP (ref 150–400)
POTASSIUM SERPL-MCNC: 4.5 MMOL/L — SIGNIFICANT CHANGE UP (ref 3.5–5.3)
POTASSIUM SERPL-SCNC: 4.5 MMOL/L — SIGNIFICANT CHANGE UP (ref 3.5–5.3)
PROT SERPL-MCNC: 6.9 GM/DL — SIGNIFICANT CHANGE UP (ref 6–8.3)
RBC # BLD: 3.05 M/UL — LOW (ref 4.2–5.8)
RBC # FLD: 12.9 % — SIGNIFICANT CHANGE UP (ref 10.3–14.5)
SODIUM SERPL-SCNC: 157 MMOL/L — HIGH (ref 135–145)
SPECIMEN SOURCE: SIGNIFICANT CHANGE UP
TOTAL CHOLESTEROL/HDL RATIO MEASUREMENT: 20.3 RATIO — HIGH (ref 3.4–9.6)
TRIGL SERPL-MCNC: 315 MG/DL — HIGH (ref 10–149)
TROPONIN I SERPL-MCNC: 0.11 NG/ML — HIGH (ref 0.01–0.04)
WBC # BLD: 10.1 K/UL — SIGNIFICANT CHANGE UP (ref 3.8–10.5)
WBC # FLD AUTO: 10.1 K/UL — SIGNIFICANT CHANGE UP (ref 3.8–10.5)

## 2018-01-16 PROCEDURE — 93306 TTE W/DOPPLER COMPLETE: CPT | Mod: 26

## 2018-01-16 RX ORDER — PIPERACILLIN AND TAZOBACTAM 4; .5 G/20ML; G/20ML
3.38 INJECTION, POWDER, LYOPHILIZED, FOR SOLUTION INTRAVENOUS EVERY 12 HOURS
Qty: 0 | Refills: 0 | Status: DISCONTINUED | OUTPATIENT
Start: 2018-01-16 | End: 2018-01-17

## 2018-01-16 RX ORDER — MEROPENEM 1 G/30ML
500 INJECTION INTRAVENOUS ONCE
Qty: 0 | Refills: 0 | Status: DISCONTINUED | OUTPATIENT
Start: 2018-01-16 | End: 2018-01-16

## 2018-01-16 RX ADMIN — PIPERACILLIN AND TAZOBACTAM 25 GRAM(S): 4; .5 INJECTION, POWDER, LYOPHILIZED, FOR SOLUTION INTRAVENOUS at 18:02

## 2018-01-16 RX ADMIN — HEPARIN SODIUM 5000 UNIT(S): 5000 INJECTION INTRAVENOUS; SUBCUTANEOUS at 15:58

## 2018-01-16 RX ADMIN — PIPERACILLIN AND TAZOBACTAM 25 GRAM(S): 4; .5 INJECTION, POWDER, LYOPHILIZED, FOR SOLUTION INTRAVENOUS at 09:19

## 2018-01-16 RX ADMIN — SODIUM CHLORIDE 100 MILLILITER(S): 9 INJECTION, SOLUTION INTRAVENOUS at 09:20

## 2018-01-16 RX ADMIN — HEPARIN SODIUM 5000 UNIT(S): 5000 INJECTION INTRAVENOUS; SUBCUTANEOUS at 05:51

## 2018-01-16 RX ADMIN — FINASTERIDE 5 MILLIGRAM(S): 5 TABLET, FILM COATED ORAL at 12:48

## 2018-01-16 RX ADMIN — SODIUM CHLORIDE 100 MILLILITER(S): 9 INJECTION, SOLUTION INTRAVENOUS at 01:25

## 2018-01-16 RX ADMIN — SODIUM CHLORIDE 100 MILLILITER(S): 9 INJECTION, SOLUTION INTRAVENOUS at 18:01

## 2018-01-16 RX ADMIN — HEPARIN SODIUM 5000 UNIT(S): 5000 INJECTION INTRAVENOUS; SUBCUTANEOUS at 22:24

## 2018-01-16 NOTE — SWALLOW BEDSIDE ASSESSMENT ADULT - ADDITIONAL RECOMMENDATIONS
1) NUTRITION FOLLOW UP AS PROFILE PLACES HIM AT NUTRITION RISK.    2) PALLIATIVE CARE FOLLOW UP 1) NUTRITION FOLLOW UP AS PROFILE PLACES HIM AT NUTRITION RISK. I EXPECT PO INTAKE TO BE REDUCED AND PT TO BE NON FEEDABLE AT TIMES. I DO NOT FEEL THAT PLACEMENT OF A FEEDING TUBE WOULD NECESSARILY ENHANCE HIS LIFE QUALITY GIVEN THE ADVANCEMENT OF HIS DEMENTIA.    2) NOTE THAT PT WAS FOLLOWED BY PALLIATIVE CARE DURING PAST HOSPITALIZATION. CONSULT PALLIATIVE CARE AS NEEDED. 1) NUTRITION FOLLOW UP AS PROFILE PLACES HIM AT NUTRITION RISK. I EXPECT PO INTAKE TO BE REDUCED AND PT TO BE NON FEEDABLE AT TIMES. I DO NOT FEEL THAT PLACEMENT OF A FEEDING TUBE WOULD NECESSARILY ENHANCE HIS LIFE QUALITY GIVEN THE ADVANCEMENT OF HIS DEMENTIA.    2) NOTE THAT PT WAS FOLLOWED BY PALLIATIVE CARE DURING PAST HOSPITALIZATION. CONSULT PALLIATIVE CARE AS NEEDED. HIS DEMENTIA IS FELT TO BE END STAGE.     3) KEEP HIS HEAD OF BED AT SOMEWHAT OF AN UPRIGHT ANGLE WHEN LYING DOWN TO GUARD AGAINST SALIVA ASPIRATION.

## 2018-01-16 NOTE — SWALLOW BEDSIDE ASSESSMENT ADULT - SWALLOW EVAL: RECOMMENDED DIET
SUGGEST A DYSPHAGIA 1 DIET WITH NECTAR THICK CONSISTENCIES, AS TOLERATED, AS HE APPEARS TO TOLERATE THESE FOOD CONSISTENCIES FROM AN OROPHARYNGEAL SWALLOWING STANCE AND PO TEXTURES WHEN ALERT/CALM(WHICH IS OFTEN NOT THE CASE) AND THIS DIET TYPE ACCOMMODATES HIS DYSPHAGIA, NOTE THAT PT MUST BE ALERT/IN CALM STATE WHEN FEEDING. SUGGEST A DYSPHAGIA 1 DIET WITH NECTAR THICK CONSISTENCIES, AS TOLERATED, AS HE APPEARS TO TOLERATE THESE FOOD CONSISTENCIES FROM AN OROPHARYNGEAL SWALLOWING STANCE WHEN ALERT/CALM AND PO TEXTURES ON THIS DIET TYPE ACCOMMODATES HIS DYSPHAGIA, NOTE THAT PT MUST BE ALERT/IN CALM STATE WHEN FEEDING WHICH IS OFTEN NOT THE CASE.

## 2018-01-16 NOTE — PROGRESS NOTE ADULT - ASSESSMENT
82yo M w/PMHx of HTN, Dementia, Major Depressive  Disorder, BPH with indwelling loredo  BIBA from [facility] for abnormal labs  and admitted with     # PRABHU  with Hypermagnesemia and Hypernatremia secondary to dehydration vs obstructive uropathy(hx BPH)  - Admit to medicine  -cont iv fluids, recheck BMP in AM daily   -continue loredo ,I/O monitor  -nephrology consult appreciated  #soft tissue gas and inflammation to scrotum and base of penis DDx includes urethral perforation,   # B/l severe hydronephrosis down to bladder with thickened urinary bladder wall   # currently patient hemodynamically stable  - cont zosyn  -urology consult awaited  -constant observation to prevent patient from pulling on the loredo catheter    #Elevated lactate without metabolic acidosis-Sepsis vs dehydration R/O UTI  - IVF, serial lactate, zosyn IV    - f/u blood cx, urine cx  -ID consult appreciated    #Elevated troponin without significant EKG changes -likely demand ischemia   - asa, lipid profile, no MI  -  echo,   - cardio consult appreciated    #chronic anemia stable  -monitor h/H    #hx HTN currently stable  - in lieu of sepsis will put BP parameters for flomax    # DVT prophylaxis   heparin SC    poc discussed with team

## 2018-01-16 NOTE — SWALLOW BEDSIDE ASSESSMENT ADULT - SLP GENERAL OBSERVATIONS
Pt seen at bedside. On encounter, 1;1 nursing supervision was being provided, Pt was awake but distractible, restless and combative at times, He could not be directed to structured communication tasks. However, he did periodically responsively verbalize when asked concrete personally relevant questions. At these times, his verbal responses were intelligible, and linguistically intact.  However, his utterances were typically brief, fragmented and contextually inappropriate consistent with Cognitive Dysfunction with chronic component associated with dementia. Pt was a poor historian. Pt seen at bedside. On encounter, 1;1 nursing supervision was being provided, Pt was arosuable but fatigued, withdrawn, internally distractible, restless and combative at times, He could not be directed to structured communication tasks and he often yelled without clear intent. However, he did periodically responsively verbalize when asked concrete personally relevant questions. At these limited times, his verbal responses were intelligible, and linguistically intact.  However, his utterances were typically brief, fragmented and contextually inappropriate consistent with Cognitive Dysfunction with chronic component associated with dementia. Pt was a poor historian.

## 2018-01-16 NOTE — CONSULT NOTE ADULT - SUBJECTIVE AND OBJECTIVE BOX
Patient is a 82y old  Male who presents with a chief complaint of Sent from [facility]  for abnormal labs (15 Yonny 2018 20:55)      HPI:  84yo M w/PMHx of HTN, Dementia, Major Depressive  Disorder, BPH with indwelling loredo  BIBA from [place] [facility] for abnormal labs . Per paperwork: baseline dementia, disoriented and non ambulatory at baseline . Labs reviewed from paperwork: 2018 Cr - 2.8 2018 Cr - 3.24 and 1/15 Cr: 4.38; Unable to obtain history from patient due to dementia; admitted dec 2017 for acute renal failure from obstructive uropathy and dehydration and proteus sepsis/UTI with blood cx positive for proteus -completed course of ceftin in rehab, in ER, afebrile, normal wbc ct, UA grossly positive, elevated LA 4.5, loredo was replaced in ER with 1000 cc or urine output and 650 cc this am , had traumatic loredo placement with some bloody discharge around penis, CT abd/pelvis/chest showed posterior RLL infiltrates ? pna and some gas in scrotum which could be from loredo placement along with thickening in bladder wall, was given IV vanco/zosyn/rocephin.       PMH: as above    PSH: as above    Meds: per reconciliation sheet, noted below    MEDICATIONS  (STANDING):  aspirin enteric coated 81 milliGRAM(s) Oral daily  dextrose 5%. 1000 milliLiter(s) (100 mL/Hr) IV Continuous <Continuous>  finasteride 5 milliGRAM(s) Oral daily  heparin  Injectable 5000 Unit(s) SubCutaneous every 8 hours  piperacillin/tazobactam IVPB. 3.375 Gram(s) IV Intermittent every 12 hours  tamsulosin 0.4 milliGRAM(s) Oral at bedtime      Allergies    No Known Allergies    Intolerances        Social: no smoking, no alcohol, no illegal drugs; no recent travel, no exposure to TB    Family history: NC  No pertinent family history in first degree relatives      ROS: unable to obtain d/t medical condition    Vital Signs Last 24 Hrs  T(C): 36.4 (2018 09:20), Max: 37.3 (15 Yonny 2018 20:07)  T(F): 97.5 (2018 09:20), Max: 99.1 (15 Yonny 2018 20:07)  HR: 81 (2018 09:20) (81 - 104)  BP: 148/74 (2018 09:20) (121/73 - 154/88)  BP(mean): --  RR: 18 (2018 09:20) (17 - 18)  SpO2: 100% (2018 09:20) (96% - 100%)      PE:  Constitutional: frail looking  HEENT: NC/AT, EOMI, PERRLA  Neck: supple  Back: no tenderness  Respiratory: decreased breath sounds  Cardiovascular: S1S2 regular, no murmurs  Abdomen: soft, not tender, not distended, positive BS  Genitourinary: deferred, loredo in place with bloody discharge around loredo from placement  Rectal: deferred  Musculoskeletal: no muscle tenderness, no joint swelling or tenderness  Extremities: no pedal edema  Neurological: confused, combative, no focal deficits  Skin: no rashes    Labs:                        8.7    10.1  )-----------( 157      ( 2018 06:07 )             27.8         157<H>  |  126<H>  |  86<H>  ----------------------------<  239<H>  4.5   |  23  |  3.82<H>    Ca    8.7      2018 06:07  Phos  3.6     -  Mg     2.4         TPro  6.9  /  Alb  1.8<L>  /  TBili  0.3  /  DBili  x   /  AST  6<L>  /  ALT  20  /  AlkPhos  160<H>       LIVER FUNCTIONS - ( 2018 06:07 )  Alb: 1.8 g/dL / Pro: 6.9 gm/dL / ALK PHOS: 160 U/L / ALT: 20 U/L / AST: 6 U/L / GGT: x           Urinalysis Basic - ( 15 Yonny 2018 17:21 )    Color: Yellow / Appearance: very cloudy / S.025 / pH: x  Gluc: x / Ketone: Trace  / Bili: Negative / Urobili: Negative mg/dL   Blood: x / Protein: 100 mg/dL / Nitrite: Negative   Leuk Esterase: Moderate / RBC: 25-50 /HPF / WBC >50   Sq Epi: x / Non Sq Epi: Occasional / Bacteria: Moderate            Radiology:  < from: CT Chest No Cont (01.15.18 @ 23:23) >    EXAM:  CT ABDOMEN AND PELVIS                          EXAM:  CT CHEST                            PROCEDURE DATE:  01/15/2018          INTERPRETATION:  Chest CT without contrast dated 1/15/2018.    COMPARISON: None available.    CLINICAL INFORMATION: Sepsis.    TECHNIQUE: Contiguous axial 2.5 mm slice thickness images of the chest   were obtained without intravenous contrast administration.    FINDINGS:    The airway shows normal caliber and contour with patent lumen.  Mild dependent atelectaticchanges in the dependent portions of the lower   lobes. Some superimposed infiltrates in the posterior right lower lobe is   suspected for which clinical correlation is recommended.    There is no pleural effusion or pneumothorax.    There are no mediastinal lymphadenopathy or masses.    The mediastinum great vessels are normal. Coronary arteries are   calcified.     The heart is normal. There is no pericardial effusion.    The bones , chronic compression deformity T12. Some degenerative changes   in the thoracolumbar spine.    IMPRESSION: Some infiltrates, and/or atelectatic changes in the right   lower lobe?,suggestive of mild pneumonia for which clinical correlation   is recommended.    Non contrast CT of the abdomen and pelvis dated 1/15/2018.     COMPARISON: 17.    CLINICAL HISTORY: Sepsis.    Technique: contiguous axial images were obtained with 2.5 mm slice   thickness without intravenous or oral contrast administration, which   limits the visualization of the intra-abdominalstructures.   Coronal and sagittal reformats were also submitted for interpretation.      FINDINGS:     There is no free intra-abdominal air or ascites.     The unopacified liver, spleen, pancreas, adrenal glands and gallbladder   are normal.         Advanced directives addressed: full resuscitation

## 2018-01-16 NOTE — ED ADULT NURSE REASSESSMENT NOTE - NS ED NURSE REASSESS COMMENT FT1
500mL of purulent/white urine came out upon change of loredo catheter.
Patient remains as previously assessed. Lethargic but easily aroused. Unable to follow commands, unable to swallow. Loredo draining clear yellow urine, purulent drainage with small amount of blood draining around loredo from tip of penis. No s/s of pain/discomfort present. Hygiene care provided. Constant observation maintained, safety & comfort measures in place. Hand-off report to KODI Pryor completed.
Patient confused, lethargic, unable to follow commands. Easily arrousable.  Unable to perform neuro checks. Loredo cath draining, cloudy yellow urine. Small amount of blood draining from tip of penis around loredo cath. Small BM, hygiene care provided. IVF and IV abx infusing as prescribed. VSS. Constant observation maintained. Safety & comfort measures in place, will continue to monitor.
Patient remains as previously assessed. MD Robison contacted to report BGM and failed dysphagia screen, voicemail sent. Constant observation maintained, safety & comfort measures in place. Will continue to monitor.
Patient received from KODI Lauren. Patient resting comfortably in bed. Safety and comfort maintained. No signs of acute distress. WIll continue to monitor.
1:1 initiated per Dr Baum.

## 2018-01-16 NOTE — CONSULT NOTE ADULT - ASSESSMENT
82 year old man with the above history admitted with renal failure, possible urosepsis, ? urethral injury and found to have mildly elevated flat troponins.  These most likely do not represent ACS.  His Ekg does have abnormal ST changes but these have been seen on previous Ekgs.  Would proceed with renal and urological work up and treat with the appropriate antibiotics. 82 year old man with the above history admitted with renal failure, possible urosepsis, ? urethral injury and found to have mildly elevated flat troponins.  These most likely do not represent ACS and are most likely related to his renal failure/demand.  His Ekg does have abnormal ST changes but these have been seen on previous Ekgs.  Would proceed with renal and urological work up and treat with the appropriate antibiotics.

## 2018-01-16 NOTE — SWALLOW BEDSIDE ASSESSMENT ADULT - NS SPL SWALLOW CLINIC TRIAL FT
He denied Odynophagia. Solids and thin liquids were not offered given dysphagic profile and considering that they were not in his food inventory at [facility].

## 2018-01-16 NOTE — SWALLOW BEDSIDE ASSESSMENT ADULT - SWALLOW EVAL: RECOMMENDED FEEDING/EATING TECHNIQUES
alternate food with liquid/position upright (90 degrees)/small sips/bites crush medication (when feasible)/maintain upright posture during/after eating for 30 mins/small sips/bites/check mouth frequently for oral residue/pocketing check mouth frequently for oral residue/pocketing/crush medication (when feasible)/maintain upright posture during/after eating for 30 mins

## 2018-01-16 NOTE — PROGRESS NOTE ADULT - PROBLEM SELECTOR PLAN 1
pt with bladder infection, bilateral hydro, chronically elevated creatinine, probable traumatic loredo insertion. Continue loredo for now. Pt will likely need chronic loredo. Will get scrotal sono as left scrotum firm.

## 2018-01-16 NOTE — SWALLOW BEDSIDE ASSESSMENT ADULT - ASR SWALLOW RECOMMEND DIAG
DEFER MBS AS PT APPEARED CLINICALLY TOLERANT OF SUGGESTED PO TEXTURES FROM AN OROPHARYNGEAL SWALLOWING STANCE ON EXAM AND CONSIDERING HIS ALTERED MENTATION/COMPLIANCE.

## 2018-01-16 NOTE — SWALLOW BEDSIDE ASSESSMENT ADULT - ORAL PREPARATORY PHASE
Pt was confused/distractible/fidgety at times when PO was offered. With that being stated, anticipatory mouth opening was noted and labial grading on utensils was felt to be grossly functional. Pt was confused/distractible/fidgety/oppositional at times when PO was offered. He also resistively closed mouth, yelled non specifically and spit at times when PO was offered.

## 2018-01-16 NOTE — SWALLOW BEDSIDE ASSESSMENT ADULT - DIET PRIOR TO ADMI
The patient was on a puree texture diet with nectar thick liquids at [facility]. Note that pt was previously seen by this service during prior hospitalizations and was diagnosed with Dysphagia/placed on a Soft Diet with Thin Liquids. However, his Dysphagia has progressed since time of prior exams by this service during previous hospitalizations. The patient was reportedly on a puree texture diet with nectar thick liquids at [facility]. Note that pt was previously seen by this service during prior hospitalizations at this facility and was diagnosed with Dysphagia/placed on a Soft Diet with Thin Liquids at the times. However, his Dysphagia has reportedly progressed since times of prior exams by this service during previous hospitalizations.

## 2018-01-16 NOTE — CONSULT NOTE ADULT - ASSESSMENT
82yo M w/PMHx of HTN, Dementia, Major Depressive  Disorder, BPH with indwelling loredo -BIBA from [facility] for abnormal labs . Per paperwork: baseline dementia, disoriented and non ambulatory at baseline . Labs reviewed from paperwork: January 8th 2018 Cr - 2.8 January 12th 2018 Cr - 3.24 and 1/15 Cr: 4.38; Unable to obtain history from patient due to dementia; admitted dec 2017 for acute renal failure from obstructive uropathy and dehydration and proteus sepsis/UTI with blood cx positive for proteus -completed course of ceftin in rehab, in ER, afebrile, normal wbc ct, UA grossly positive, elevated LA 4.5, loredo was replaced in ER with 1000 cc or urine output and 650 cc this am 1/16, had traumatic loredo placement with some bloody discharge around penis, CT abd/pelvis/chest showed posterior RLL infiltrates ? pna and some gas in scrotum which could be from loredo placement along with thickening in bladder wall, was given IV vanco/zosyn/rocephin.     1. RLL PNA/cystitis/obstructive uropathy/avel/BPH with chronic indwelling loredo  - loredo replaced in er, imaging findings likely d/t trauma from placement  - d/c meropenem  - start zosyn 3.375mg q12h renally-dose adjusted for lung/ coverage  - f/u urine cx/blood cultures  - noted to have posterior RLL infiltrates on CT chest ? aspiration  - monitor temps  -tolerating abx well so far; no side effects noted.  -reason for abx use and side effects reviewed with patient  - urology eval   - f/u cbc  - supportive care    2. other issues - care per medicine

## 2018-01-16 NOTE — SWALLOW BEDSIDE ASSESSMENT ADULT - SWALLOW EVAL: SECRETION MANAGEMENT
Limited probed revealed that no drooling was noted and strength of his non nutritive cough was sufficient, Limited probed revealed that no drooling was noted but his reflexive cough was somewhat moist/produced with mildly weakened strength.
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
## 2018-01-16 NOTE — SWALLOW BEDSIDE ASSESSMENT ADULT - ORAL PHASE
Bolus formation/transfer were mildly prolonged/disorganized/discontinuous but mechanically functional, Piecemeal deglutition was evident. Mild tongue debris noted with coarse solids only. Bolus formation/transfer were achieved via moderately prolonged/disorganized/discontinuous lingual pumping actions that were grossly mechanically functional with above limited PO types when he was calm.  Piecemeal deglutition was evident. Mild tongue debris noted with pureed foods.

## 2018-01-16 NOTE — CONSULT NOTE ADULT - SUBJECTIVE AND OBJECTIVE BOX
CHIEF COMPLAINT: Patient is a 82y old  Male who presents with a chief complaint of Sent from [facility]  for abnormal labs (15 Yonny 2018 20:55)      HPI: HPI:  84yo M w/PMHx of HTN, Dementia, Major Depressive  Disorder, BPH with indwelling loredo  BIBA from [place] [facility] for abnormal labs .   Per paperwork: baseline dementia, disoriented and non ambulatory at baseline . Labs reviewed from paperwork: January 8th 2018 Cr - 2.8   January 12th 2018 Cr - 3.24 and Today- Cr: 4.38; Unable to obtain history from patient due to demntia.patient opens eyes to verbal stimulus,does not make eye contact,nonverbal during my exam,moves all extremities spontaneously, does not follow commands  ; admitted dec 2017  for acute renal failure from obstructive uropathy and dehydration  and UTi with blood cx positive for proteus .completed course of ceftin in rehab    In Ed Tmax 99 F, , /73, pulse ox 96% RA  Patient was found to have PRABHU , K5, Mg 2.7, Na 159, lactate 4.5,   troponin 0.188, EKG sinus tachycardia 109bpm, ST depression in anterior leads- anterior subendocardial injury unchanged and no significant changes from EKG done 12/26/17   CXR chronic small left pleural effusion vs chronic left atelectasis unchanged from CXr done 12/27/17    in Ed patient received 2 liters NS, ceftriaxone, asa suppository  loredo replaced in ED making urine output 1000cc in ED per RN after new loredo placed     family not present at bedside (15 Yonny 2018 20:55)      PMHx: PAST MEDICAL & SURGICAL HISTORY:  Nasal bone fractures  Posture abnormality  Gait abnormality  Dementia with behavioral disturbance, unspecified dementia type  Macular degeneration  Depression  Hypertension  Dementia  No significant past surgical history        Soc Hx:     FAMILY HISTORY:  No pertinent family history in first degree relatives      Allergies: Allergies    No Known Allergies    Intolerances          REVIEW OF SYSTEMS:    As above  No chest pain or shortness of breath  No lightheadeness or syncope  No leg swelling  No palpitations  No claudication-like symptoms    Vital Signs Last 24 Hrs  T(C): 37 (16 Jan 2018 05:11), Max: 37.3 (15 Yonny 2018 20:07)  T(F): 98.6 (16 Jan 2018 05:11), Max: 99.1 (15 Yonny 2018 20:07)  HR: 93 (16 Jan 2018 05:11) (93 - 104)  BP: 154/88 (16 Jan 2018 05:11) (121/73 - 154/88)  BP(mean): --  RR: 18 (16 Jan 2018 05:11) (17 - 18)  SpO2: 100% (16 Jan 2018 05:11) (96% - 100%)    I&O's Summary    15 Yonny 2018 07:01  -  16 Jan 2018 07:00  --------------------------------------------------------  IN: 0 mL / OUT: 2600 mL / NET: -2600 mL        CAPILLARY BLOOD GLUCOSE          PHYSICAL EXAM:   Patient in NAD  Neck: No JVD; Carotids:  2+ without bruits  Respiratory:  Clear to A&P  Cardiovascular: S1 and S2, regular rate and rhythm, no Murmurs, gallops or rubs  Gastrointestinal:  Soft, non-tender; BS positive  Extremities: No peripheral edema  Vascular: 2+ peripheral pulses  Neurological: A/O x 3, no focal deficits      MEDICATIONS:  MEDICATIONS  (STANDING):  aspirin enteric coated 81 milliGRAM(s) Oral daily  dextrose 5%. 1000 milliLiter(s) (100 mL/Hr) IV Continuous <Continuous>  finasteride 5 milliGRAM(s) Oral daily  heparin  Injectable 5000 Unit(s) SubCutaneous every 8 hours  meropenem IVPB 500 milliGRAM(s) IV Intermittent once  tamsulosin 0.4 milliGRAM(s) Oral at bedtime      LABS: All Labs Reviewed:  Blood Culture:   BNP   CBC             WBC Count: 10.1 K/uL (01-16 @ 06:07)  WBC Count: 10.2 K/uL (01-15 @ 17:21)              Hemoglobin: 8.7 g/dL (01-16 @ 06:07)  Hemoglobin: 10.1 g/dL (01-15 @ 17:21)              Hematocrit: 27.8 % (01-16 @ 06:07)  Hematocrit: 32.6 % (01-15 @ 17:21)              Mean Cell Volume: 91.1 fl (01-16 @ 06:07)  Mean Cell Volume: 91.1 fl (01-15 @ 17:21)              Platelet Count - Automated: 157 K/uL (01-16 @ 06:07)  Platelet Count - Automated: 188 K/uL (01-15 @ 17:21)                            Cardiac markers             Troponin I, Serum: 0.113 ng/mL (01-16 @ 06:07)  Troponin I, Serum: 0.142 ng/mL (01-15 @ 22:13)  Troponin I, Serum: 0.188 ng/mL (01-15 @ 17:21)                             Chems        Sodium, Serum: 157 mmol/L (01-16 @ 06:07)  Sodium, Serum: 160 mmol/L (01-15 @ 22:13)  Sodium, Serum: 159 mmol/L (01-15 @ 17:21)          Potassium, Serum: 4.5 mmol/L (01-16 @ 06:07)  Potassium, Serum: 5.0 mmol/L (01-15 @ 22:13)  Potassium, Serum: 5.0 mmol/L (01-15 @ 17:21)          Blood Urea Nitrogen, Serum: 86 mg/dL (01-16 @ 06:07)  Blood Urea Nitrogen, Serum: 89 mg/dL (01-15 @ 22:13)  Blood Urea Nitrogen, Serum: 95 mg/dL (01-15 @ 17:21)          Creatinine 3.82  Creatinine 4.08  Creatinine 4.72          Magnesium, Serum: 2.4 mg/dL (01-16 @ 06:07)  Magnesium, Serum: 2.4 mg/dL (01-15 @ 22:13)  Magnesium, Serum: 2.7 mg/dL (01-15 @ 17:21)          Protein Total, Serum: 6.9 gm/dL (01-16 @ 06:07)  Protein Total, Serum: 8.3 gm/dL (01-15 @ 17:21)                  Calcium, Total Serum: 8.7 mg/dL (01-16 @ 06:07)  Calcium, Total Serum: 8.9 mg/dL (01-15 @ 22:13)  Calcium, Total Serum: 9.3 mg/dL (01-15 @ 17:21)          Phosphorus Level, Serum: 3.6 mg/dL (01-16 @ 06:07)          Bilirubin Total, Serum: 0.3 mg/dL (01-16 @ 06:07)  Bilirubin Total, Serum: 0.4 mg/dL (01-15 @ 17:21)          Alanine Aminotransferase (ALT/SGPT): 20 U/L (01-16 @ 06:07)  Alanine Aminotransferase (ALT/SGPT): 28 U/L (01-15 @ 17:21)          Aspartate Aminotransferase (AST/SGOT): 6 U/L (01-16 @ 06:07)  Aspartate Aminotransferase (AST/SGOT): 12 U/L (01-15 @ 17:21)                 INR: 1.24 ratio (01-15 @ 17:21)             RADIOLOGY:    EKG:  ST; RBBB with significant repolarization changes/ischemic-like changes which was seen on previous Ekg    Telemetry: Sinus    ECHO:

## 2018-01-16 NOTE — ED ADULT NURSE REASSESSMENT NOTE - COMFORT CARE
wait time explained/plan of care explained/side rails up
plan of care explained/side rails up/side rails down

## 2018-01-16 NOTE — SWALLOW BEDSIDE ASSESSMENT ADULT - SWALLOW EVAL: DIAGNOSIS
1) The patient demonstrates periodically reduced orientation to feeding with periodic psychogenically reduced willingness to accept PO(i.e aversive head turning, yelling, hand swatting, "spitting") atop chronic Oropharyngeal Dysphagia which subjectively appears to be a grossly functional condition with a restricted inventory of some modified food textures providing that pt is alert/calm when feeding.  Dysphagia is felt to be chronic due to dementia and feedability/severity of Dysphagia are apt to fluctuate with changes in alertness/mood/mentation. In any case, his oropharyngeal swallow integrity is still felt to be grossly WFL for some modified PO types when he is alert/calm and I suspect that he is at oropharyngeal swallow baseline.  2) Communicative competence is reduced in setting of chronic Cognitive Dysfunction due to dementia. Cognitive deficits are apt to appear heightened when his routine is changed/he is acutely ill. His need must typically be anticipated. 1) The patient demonstrates periodically reduced orientation to feeding with periodic psychogenically reduced willingness to accept PO(i.e aversive head turning, yelling, hand swatting, "spitting") atop chronic Oropharyngeal Dysphagia which subjectively appears to be a grossly functional condition with a restricted inventory of some modified food textures providing that pt is alert/calm when feeding.  Dysphagia is felt to be chronic due to dementia and feedability/severity of Dysphagia are apt to fluctuate with changes in alertness/mood/mentation. In any case, his oropharyngeal swallow integrity is still felt to be grossly WFL for some modified PO types when he is alert/calm and I suspect that he is at swallow baseline.  Profile places at nutrition risk.  2) Communicative competence is reduced in setting of chronic Cognitive Dysfunction due to dementia. Cognitive deficits are apt to appear heightened when his routine is changed/he is acutely ill. His need must typically be anticipated.

## 2018-01-16 NOTE — CONSULT NOTE ADULT - ASSESSMENT
82 with a history of CKD, CVA, dementia, HTN here with hypernatremia and PRABHU, renal evaluation. PRABHU from dehydration    PRABHU  -with persistent rise in renal function, rise in Na+ and depressed albumin clearly he is not taking adequate po to maintain reasonable life quality or health  -Long discussion with son regarding above via phone, father has baseline aggressive dementia for years per son and current reports, unclear what there current plan is (he states all aggressive measures to be continued).   -D/c with him at length need to have adequate intake to sustain life, he reports will d/c with sister further.   -With his aggressive dementia, history of pulling loredo he would not be a good candidate for alternate feeding, but if family insists on aggressive care, as does not appear to be taking adequte po then this must be addressed otherwise these admissions will continue  -Nutrition/ Speech and swallow  -Calorie count  -IVF to keep net positive  -Not a candidate for HD now or in future, would cause more harm with dialysis catheter access. Son has agreed to this in the past that would be futile. He does not based on labs need at this point    Hypernatremia  -Hypotonic IVF  -Oral intake as cleared by nutrition/speech teams  -Trend labs    Urology  -Consult pending for ct finings, traumatic from him pulling?  -Await there plan  -Abx ongoing    Palliative for goals of cares    d/c with son via phone  d/c with RN

## 2018-01-16 NOTE — SWALLOW BEDSIDE ASSESSMENT ADULT - PHARYNGEAL PHASE
Swallow trigger was timely and laryngeal lift on palpation during swallow trials was felt to be functional/within age acceptable parameters. No behavioral aspiration signs exhibited on exam. Swallow trigger was timely to mildly latent and laryngeal lift on palpation during swallow trials was mildly to moderately reduced but felt to be grossly functional with above PO types. No behavioral aspiration signs exhibited on exam with pureed foods/nectar thick liquids.

## 2018-01-16 NOTE — SWALLOW BEDSIDE ASSESSMENT ADULT - SWALLOW EVAL: CRITERIA FOR SKILLED INTERVENTION MET
Pt's Dysphagia/Cognitive Dysfunction are felt to be chronic pre-exiting irreversible conditions. Acute speech path intervention would not /be of benefit. Moreover, his cognitive dysfunction is too severe for pt to be a viable speech/swallow therapy candidate nonetheless, As such. WILL NOT ACTIVELY FOLLOW. RECONSULT PRN.

## 2018-01-17 LAB
-  AMIKACIN: SIGNIFICANT CHANGE UP
-  AMPICILLIN/SULBACTAM: SIGNIFICANT CHANGE UP
-  AMPICILLIN: SIGNIFICANT CHANGE UP
-  AZTREONAM: SIGNIFICANT CHANGE UP
-  CEFAZOLIN: SIGNIFICANT CHANGE UP
-  CEFEPIME: SIGNIFICANT CHANGE UP
-  CEFOXITIN: SIGNIFICANT CHANGE UP
-  CEFTAZIDIME: SIGNIFICANT CHANGE UP
-  CEFTRIAXONE: SIGNIFICANT CHANGE UP
-  CIPROFLOXACIN: SIGNIFICANT CHANGE UP
-  ERTAPENEM: SIGNIFICANT CHANGE UP
-  GENTAMICIN: SIGNIFICANT CHANGE UP
-  IMIPENEM: SIGNIFICANT CHANGE UP
-  LEVOFLOXACIN: SIGNIFICANT CHANGE UP
-  MEROPENEM: SIGNIFICANT CHANGE UP
-  NITROFURANTOIN: SIGNIFICANT CHANGE UP
-  PIPERACILLIN/TAZOBACTAM: SIGNIFICANT CHANGE UP
-  TOBRAMYCIN: SIGNIFICANT CHANGE UP
-  TRIMETHOPRIM/SULFAMETHOXAZOLE: SIGNIFICANT CHANGE UP
ANION GAP SERPL CALC-SCNC: 7 MMOL/L — SIGNIFICANT CHANGE UP (ref 5–17)
BUN SERPL-MCNC: 72 MG/DL — HIGH (ref 7–23)
CALCIUM SERPL-MCNC: 8.6 MG/DL — SIGNIFICANT CHANGE UP (ref 8.5–10.1)
CHLORIDE SERPL-SCNC: 120 MMOL/L — HIGH (ref 96–108)
CO2 SERPL-SCNC: 23 MMOL/L — SIGNIFICANT CHANGE UP (ref 22–31)
CREAT SERPL-MCNC: 3.41 MG/DL — HIGH (ref 0.5–1.3)
CULTURE RESULTS: SIGNIFICANT CHANGE UP
ENTEROCOC DNA BLD POS QL NAA+NON-PROBE: SIGNIFICANT CHANGE UP
GLUCOSE BLDC GLUCOMTR-MCNC: 147 MG/DL — HIGH (ref 70–99)
GLUCOSE BLDC GLUCOMTR-MCNC: 157 MG/DL — HIGH (ref 70–99)
GLUCOSE BLDC GLUCOMTR-MCNC: 213 MG/DL — HIGH (ref 70–99)
GLUCOSE BLDC GLUCOMTR-MCNC: 221 MG/DL — HIGH (ref 70–99)
GLUCOSE BLDC GLUCOMTR-MCNC: 252 MG/DL — HIGH (ref 70–99)
GLUCOSE SERPL-MCNC: 214 MG/DL — HIGH (ref 70–99)
HCT VFR BLD CALC: 27.9 % — LOW (ref 39–50)
HGB BLD-MCNC: 8.7 G/DL — LOW (ref 13–17)
MCHC RBC-ENTMCNC: 28.4 PG — SIGNIFICANT CHANGE UP (ref 27–34)
MCHC RBC-ENTMCNC: 31.2 GM/DL — LOW (ref 32–36)
MCV RBC AUTO: 90.9 FL — SIGNIFICANT CHANGE UP (ref 80–100)
METHOD TYPE: SIGNIFICANT CHANGE UP
METHOD TYPE: SIGNIFICANT CHANGE UP
ORGANISM # SPEC MICROSCOPIC CNT: SIGNIFICANT CHANGE UP
ORGANISM # SPEC MICROSCOPIC CNT: SIGNIFICANT CHANGE UP
PLATELET # BLD AUTO: 169 K/UL — SIGNIFICANT CHANGE UP (ref 150–400)
POTASSIUM SERPL-MCNC: 4.1 MMOL/L — SIGNIFICANT CHANGE UP (ref 3.5–5.3)
POTASSIUM SERPL-SCNC: 4.1 MMOL/L — SIGNIFICANT CHANGE UP (ref 3.5–5.3)
RBC # BLD: 3.07 M/UL — LOW (ref 4.2–5.8)
RBC # FLD: 13.5 % — SIGNIFICANT CHANGE UP (ref 10.3–14.5)
SODIUM SERPL-SCNC: 150 MMOL/L — HIGH (ref 135–145)
SPECIMEN SOURCE: SIGNIFICANT CHANGE UP
WBC # BLD: 11.2 K/UL — HIGH (ref 3.8–10.5)
WBC # FLD AUTO: 11.2 K/UL — HIGH (ref 3.8–10.5)

## 2018-01-17 RX ORDER — GLUCAGON INJECTION, SOLUTION 0.5 MG/.1ML
1 INJECTION, SOLUTION SUBCUTANEOUS ONCE
Qty: 0 | Refills: 0 | Status: DISCONTINUED | OUTPATIENT
Start: 2018-01-17 | End: 2018-01-23

## 2018-01-17 RX ORDER — INSULIN LISPRO 100/ML
VIAL (ML) SUBCUTANEOUS
Qty: 0 | Refills: 0 | Status: DISCONTINUED | OUTPATIENT
Start: 2018-01-17 | End: 2018-01-23

## 2018-01-17 RX ORDER — DEXTROSE 50 % IN WATER 50 %
1 SYRINGE (ML) INTRAVENOUS ONCE
Qty: 0 | Refills: 0 | Status: DISCONTINUED | OUTPATIENT
Start: 2018-01-17 | End: 2018-01-23

## 2018-01-17 RX ORDER — DEXTROSE 50 % IN WATER 50 %
25 SYRINGE (ML) INTRAVENOUS ONCE
Qty: 0 | Refills: 0 | Status: DISCONTINUED | OUTPATIENT
Start: 2018-01-17 | End: 2018-01-23

## 2018-01-17 RX ORDER — MEROPENEM 1 G/30ML
250 INJECTION INTRAVENOUS EVERY 12 HOURS
Qty: 0 | Refills: 0 | Status: DISCONTINUED | OUTPATIENT
Start: 2018-01-17 | End: 2018-01-18

## 2018-01-17 RX ORDER — SODIUM CHLORIDE 9 MG/ML
1000 INJECTION, SOLUTION INTRAVENOUS
Qty: 0 | Refills: 0 | Status: DISCONTINUED | OUTPATIENT
Start: 2018-01-17 | End: 2018-01-18

## 2018-01-17 RX ORDER — DEXTROSE 50 % IN WATER 50 %
12.5 SYRINGE (ML) INTRAVENOUS ONCE
Qty: 0 | Refills: 0 | Status: DISCONTINUED | OUTPATIENT
Start: 2018-01-17 | End: 2018-01-23

## 2018-01-17 RX ADMIN — MEROPENEM 100 MILLIGRAM(S): 1 INJECTION INTRAVENOUS at 23:44

## 2018-01-17 RX ADMIN — HEPARIN SODIUM 5000 UNIT(S): 5000 INJECTION INTRAVENOUS; SUBCUTANEOUS at 13:00

## 2018-01-17 RX ADMIN — TAMSULOSIN HYDROCHLORIDE 0.4 MILLIGRAM(S): 0.4 CAPSULE ORAL at 21:38

## 2018-01-17 RX ADMIN — PIPERACILLIN AND TAZOBACTAM 25 GRAM(S): 4; .5 INJECTION, POWDER, LYOPHILIZED, FOR SOLUTION INTRAVENOUS at 17:20

## 2018-01-17 RX ADMIN — Medication 81 MILLIGRAM(S): at 12:24

## 2018-01-17 RX ADMIN — PIPERACILLIN AND TAZOBACTAM 25 GRAM(S): 4; .5 INJECTION, POWDER, LYOPHILIZED, FOR SOLUTION INTRAVENOUS at 05:39

## 2018-01-17 RX ADMIN — SODIUM CHLORIDE 100 MILLILITER(S): 9 INJECTION, SOLUTION INTRAVENOUS at 05:39

## 2018-01-17 RX ADMIN — HEPARIN SODIUM 5000 UNIT(S): 5000 INJECTION INTRAVENOUS; SUBCUTANEOUS at 21:38

## 2018-01-17 RX ADMIN — HEPARIN SODIUM 5000 UNIT(S): 5000 INJECTION INTRAVENOUS; SUBCUTANEOUS at 05:39

## 2018-01-17 RX ADMIN — Medication 1: at 18:18

## 2018-01-17 RX ADMIN — FINASTERIDE 5 MILLIGRAM(S): 5 TABLET, FILM COATED ORAL at 12:24

## 2018-01-17 NOTE — PROGRESS NOTE ADULT - ASSESSMENT
82yo M w/PMHx of HTN, Dementia, Major Depressive  Disorder, BPH with indwelling loredo -BIBA from [facility] for abnormal labs . Per paperwork: baseline dementia, disoriented and non ambulatory at baseline . Labs reviewed from paperwork: January 8th 2018 Cr - 2.8 January 12th 2018 Cr - 3.24 and 1/15 Cr: 4.38; Unable to obtain history from patient due to dementia; admitted dec 2017 for acute renal failure from obstructive uropathy and dehydration and proteus sepsis/UTI with blood cx positive for proteus -completed course of ceftin in rehab, in ER, afebrile, normal wbc ct, UA grossly positive, elevated LA 4.5, loredo was replaced in ER with 1000 cc or urine output and 650 cc this am 1/16, had traumatic loredo placement with some bloody discharge around penis, CT abd/pelvis/chest showed posterior RLL infiltrates ? pna and some gas in scrotum which could be from loredo placement along with thickening in bladder wall, was given IV vanco/zosyn/rocephin.     1. Enterococcus sepsis/Ecoli UTI/RLL PNA/obstructive uropathy/avel/BPH with chronic indwelling loredo  - slowly improving  - urology eval noted  - blood cx growing enterococcus source - urinary ? endocarditis ? CT abd/pelvis wnl - gi source likely  - noted to have posterior RLL infiltrates on CT chest ? aspiration  - on IV zosyn 3.375mg q12h renally-dose adjusted for lung/ coverage #2  - continue with antibiotic coverage   - f/u sensitivities   - repeat blood cx   - check TTE  - monitor temps  - tolerating abx well so far; no side effects noted.  - reason for abx use and side effects reviewed with patient  - f/u cbc  - supportive care    2. other issues - care per medicine

## 2018-01-17 NOTE — PROGRESS NOTE ADULT - ASSESSMENT
82 year old man with the above history admitted with renal failure, possible urosepsis, ? urethral injury and found to have mildly elevated flat troponins.  These most likely do not represent ACS and are most likely related to his renal failure/demand.  His Ekg does have abnormal ST changes but these have been seen on previous Ekgs.  Would proceed with renal and urological work up and treat with the appropriate antibiotics.    1/17/18:  Cardiac status stable.  Will follow prn

## 2018-01-17 NOTE — DIETITIAN INITIAL EVALUATION ADULT. - OTHER INFO
82yo M w/PMHx of HTN, Dementia, Major Depressive  Disorder, BPH with indwelling loredo  BIBA from [facility] for abnormal labs . Dx of PRABHU, elevated lactate without metabolic acidosis, r/o UTI, elevated troponins,  HTN, dementia.  SLP reports that pt can tolerate Dysphagia I diet with NT liquids.  Pt was on pureed diet at [place], dx'ed with dysphagia. Pt has advanced dementia and will tolerate dysphagia 1 - mechanical soft diet- with NT liquids when alert and calm.No edema noted.  Raul 8.  Stage II PU left greater trochanter. 84yo M w/PMHx of HTN, Dementia, Major Depressive  Disorder, BPH with indwelling loredo  BIBA from [place]  [facility] for abnormal labs . Dx of PRABHU, elevated lactate without metabolic acidosis, r/o UTI, elevated troponins,  HTN, dementia.  SLP reports that pt can tolerate Dysphagia I diet with NT liquids.  Pt was on pureed diet at [place], 'ed with dysphagia. Pt has advanced dementia and will tolerate dysphagia 1 - mechanical soft diet- with NT liquids when alert and calm.No edema noted.  Raul 8.  Stage II PU left greater trochanter. Pt meets criteria for severe protein-calorie malnutrition in context of chronic disease .  nutrition focused physical exam reveals severe muscle wasting, (thighs and calves, interosseus, temporalis) moderate muscle wasting (temporalis, interosseus), moderate fat depletion (triceps, buccal area).  No report of prior PO intake or prior wt.  Suggest advance pt diet to mechanical soft with NT liquids when medically feasible, add Ensure enlive 8 oz tid.

## 2018-01-17 NOTE — CHART NOTE - NSCHARTNOTEFT_GEN_A_CORE
Upon Nutritional Assessment by the Registered Dietitian your patient was determined to meet criteria / has evidence of the following diagnosis/diagnoses:          [ ]  Mild Protein Calorie Malnutrition        [ ]  Moderate Protein Calorie Malnutrition        [ x] Severe Protein Calorie Malnutrition        [ ] Unspecified Protein Calorie Malnutrition        [ ] Underweight / BMI <19        [ ] Morbid Obesity / BMI > 40      Findings as based on:  •  Comprehensive nutrition assessment and consultation  •  Calorie counts (nutrient intake analysis)  •  Food acceptance and intake status from observations by staff  •  Follow up  •  Patient education  •  Intervention secondary to interdisciplinary rounds  •   concerns      Treatment:    The following diet has been recommended:    advance to mechanical soft diet with Nectar Thick liquids when medically feasible  recommendation as per SLP  feed pt when awake and alert to maximize PO intake  add Ensure Enlive 8 oz NT   PO intake encouraged.       PROVIDER Section:     By signing this assessment you are acknowledging and agree with the diagnosis/diagnoses assigned by the Registered Dietitian    Comments:

## 2018-01-17 NOTE — INPATIENT CERTIFICATION FOR MEDICARE PATIENTS - RISKS OF ADVERSE EVENTS
Concern for worsening infectious process/Concern for delay in diagnosis and treatment/Other:/Concern for renal deterioration/Concern for cardiopulmonary deterioration/Concern for neurologic deterioration

## 2018-01-17 NOTE — DIETITIAN INITIAL EVALUATION ADULT. - ENERGY NEEDS
Ht.    68  "        Wt.  77      kg               BMI 25.8                 IBW     68  kg               Pt is at  113  %  IBW

## 2018-01-17 NOTE — PROGRESS NOTE ADULT - ASSESSMENT
A/P     #PRABHU  with Hypernatremia secondary to dehydration vs obstructive uropathy(hx BPH)  -ct iv fluids and monitoring     #soft tissue gas and inflammation to scrotum and base of penis DDx includes urethral perforation  -urology evaluation and follow up appreciated     # B/l severe hydronephrosis down to bladder with thickened urinary bladder wall   - cont zosyn    #Elevated lactate without metabolic acidosis  -most likely due to dehydration-monitor it     #Elevated troponin without significant EKG changes -likely demand ischemia   - asa, lipid profile, no MI      #chronic anemia stable  -monitor h/H    #hx HTN currently stable  - in lieu of sepsis will put BP parameters for flomax    # DVT prophylaxis   heparin SC

## 2018-01-17 NOTE — PROGRESS NOTE ADULT - ASSESSMENT
82 with a history of CKD, CVA, dementia, HTN here with hypernatremia and PRABHU, renal evaluation. PRABHU from dehydration    PRABHU  -Renal function stabilizing with hydration  -Maintain dextrose based IVF  -Not a candidate for HD now or in future.  -Nutrition/speech evaluated. Need for calorie count? With recurrent dehydration does not appear to be meeting intake goals    Hypernatremia  -Hypotonic IVF  -Oral intake as cleared by nutrition/speech teams  -Trend labs    Urology  -Consult noted, awaiting US  -Loredo per there service. Has had PRABHU with loredo removal in the past      Would again consider palliative evaluation    d/c with family at bedside at length  d/c with RN

## 2018-01-18 LAB
-  AMPICILLIN: SIGNIFICANT CHANGE UP
-  GENTAMICIN SYNERGY: SIGNIFICANT CHANGE UP
-  VANCOMYCIN: SIGNIFICANT CHANGE UP
ALBUMIN SERPL ELPH-MCNC: 1.8 G/DL — LOW (ref 3.3–5)
ANION GAP SERPL CALC-SCNC: 11 MMOL/L — SIGNIFICANT CHANGE UP (ref 5–17)
BUN SERPL-MCNC: 74 MG/DL — HIGH (ref 7–23)
CALCIUM SERPL-MCNC: 8.4 MG/DL — LOW (ref 8.5–10.1)
CHLORIDE SERPL-SCNC: 116 MMOL/L — HIGH (ref 96–108)
CO2 SERPL-SCNC: 21 MMOL/L — LOW (ref 22–31)
CREAT SERPL-MCNC: 4.01 MG/DL — HIGH (ref 0.5–1.3)
CULTURE RESULTS: SIGNIFICANT CHANGE UP
GLUCOSE BLDC GLUCOMTR-MCNC: 122 MG/DL — HIGH (ref 70–99)
GLUCOSE BLDC GLUCOMTR-MCNC: 151 MG/DL — HIGH (ref 70–99)
GLUCOSE BLDC GLUCOMTR-MCNC: 207 MG/DL — HIGH (ref 70–99)
GLUCOSE SERPL-MCNC: 213 MG/DL — HIGH (ref 70–99)
HBA1C BLD-MCNC: 6.1 % — HIGH (ref 4–5.6)
METHOD TYPE: SIGNIFICANT CHANGE UP
ORGANISM # SPEC MICROSCOPIC CNT: SIGNIFICANT CHANGE UP
PHOSPHATE SERPL-MCNC: 3.1 MG/DL — SIGNIFICANT CHANGE UP (ref 2.5–4.5)
POTASSIUM SERPL-MCNC: 4.5 MMOL/L — SIGNIFICANT CHANGE UP (ref 3.5–5.3)
POTASSIUM SERPL-SCNC: 4.5 MMOL/L — SIGNIFICANT CHANGE UP (ref 3.5–5.3)
SODIUM SERPL-SCNC: 148 MMOL/L — HIGH (ref 135–145)
SPECIMEN SOURCE: SIGNIFICANT CHANGE UP

## 2018-01-18 PROCEDURE — 74176 CT ABD & PELVIS W/O CONTRAST: CPT | Mod: 26

## 2018-01-18 PROCEDURE — 76870 US EXAM SCROTUM: CPT | Mod: 26

## 2018-01-18 RX ORDER — FENTANYL CITRATE 50 UG/ML
25 INJECTION INTRAVENOUS
Qty: 0 | Refills: 0 | Status: DISCONTINUED | OUTPATIENT
Start: 2018-01-18 | End: 2018-01-18

## 2018-01-18 RX ORDER — SODIUM CHLORIDE 9 MG/ML
1000 INJECTION, SOLUTION INTRAVENOUS
Qty: 0 | Refills: 0 | Status: DISCONTINUED | OUTPATIENT
Start: 2018-01-18 | End: 2018-01-19

## 2018-01-18 RX ORDER — MEROPENEM 1 G/30ML
500 INJECTION INTRAVENOUS EVERY 12 HOURS
Qty: 0 | Refills: 0 | Status: COMPLETED | OUTPATIENT
Start: 2018-01-18 | End: 2018-01-24

## 2018-01-18 RX ORDER — ONDANSETRON 8 MG/1
4 TABLET, FILM COATED ORAL ONCE
Qty: 0 | Refills: 0 | Status: DISCONTINUED | OUTPATIENT
Start: 2018-01-18 | End: 2018-01-18

## 2018-01-18 RX ADMIN — HEPARIN SODIUM 5000 UNIT(S): 5000 INJECTION INTRAVENOUS; SUBCUTANEOUS at 06:13

## 2018-01-18 RX ADMIN — SODIUM CHLORIDE 100 MILLILITER(S): 9 INJECTION, SOLUTION INTRAVENOUS at 04:05

## 2018-01-18 RX ADMIN — FINASTERIDE 5 MILLIGRAM(S): 5 TABLET, FILM COATED ORAL at 09:08

## 2018-01-18 RX ADMIN — HEPARIN SODIUM 5000 UNIT(S): 5000 INJECTION INTRAVENOUS; SUBCUTANEOUS at 22:15

## 2018-01-18 RX ADMIN — Medication 2: at 09:07

## 2018-01-18 RX ADMIN — Medication 81 MILLIGRAM(S): at 09:08

## 2018-01-18 RX ADMIN — Medication: at 17:50

## 2018-01-18 RX ADMIN — Medication 1: at 11:52

## 2018-01-18 RX ADMIN — MEROPENEM 100 MILLIGRAM(S): 1 INJECTION INTRAVENOUS at 18:26

## 2018-01-18 RX ADMIN — MEROPENEM 100 MILLIGRAM(S): 1 INJECTION INTRAVENOUS at 06:13

## 2018-01-18 NOTE — BRIEF OPERATIVE NOTE - PROCEDURE
<<-----Click on this checkbox to enter Procedure Incision and drainage of abscess of perineum, male  01/18/2018    Active  TSPEARS

## 2018-01-18 NOTE — PROGRESS NOTE ADULT - ASSESSMENT
82yo M w/PMHx of HTN, Dementia, Major Depressive  Disorder, BPH with indwelling loredo -BIBA from [facility] for abnormal labs . Per paperwork: baseline dementia, disoriented and non ambulatory at baseline . Labs reviewed from paperwork: January 8th 2018 Cr - 2.8 January 12th 2018 Cr - 3.24 and 1/15 Cr: 4.38; Unable to obtain history from patient due to dementia; admitted dec 2017 for acute renal failure from obstructive uropathy and dehydration and proteus sepsis/UTI with blood cx positive for proteus -completed course of ceftin in rehab, in ER, afebrile, normal wbc ct, UA grossly positive, elevated LA 4.5, loredo was replaced in ER with 1000 cc or urine output and 650 cc this am 1/16, had traumatic loredo placement with some bloody discharge around penis, CT abd/pelvis/chest showed posterior RLL infiltrates ? pna and some gas in scrotum which could be from loredo placement along with thickening in bladder wall, was given IV vanco/zosyn/rocephin.     1. E faecalis sepsis/ESBL Ecoli UTI/RLL PNA/obstructive uropathy/avel/BPH with chronic indwelling loredo/perineal abscess  - slowly improving  - urology eval noted  - blood cx growing enterococcus source - likely  related  - noted to have posterior RLL infiltrates on CT chest ? aspiration  - completed 2 days of zosyn  - abx changed to meropenem [id number]   - continue with antibiotic coverage   - repeat blood cx 1/17 no growth  - TTE wnl  - noted to have perineal abscess, plan for further I and D, f/u cx  - monitor temps  - tolerating abx well so far; no side effects noted.  - reason for abx use and side effects reviewed with patient  - f/u cbc  - supportive care    2. other issues - care per medicine

## 2018-01-18 NOTE — PROGRESS NOTE ADULT - ASSESSMENT
A/P     #PRABHU  with Hypernatremia secondary to dehydration vs obstructive uropathy(hx BPH)  -ct iv fluids and monitoring   -pt has chronic b/l hydroureteronephrosis     #soft tissue gas and inflammation to scrotum and base of penis - was abscess   -today pt underwent surgery by Dr. Smith   -monitor him closely in step down unit   -CT -cystogram to see placement of loredo's or any trauma     # B/l severe hydronephrosis down to bladder with thickened urinary bladder wall   -merrema     #possible pneumonia- ct abx     #Bactremia ? source-  merrem, echo to follow     #Elevated lactate without metabolic acidosis       #Elevated troponin without significant EKG changes -likely demand ischemia   - asa, lipid profile, no MI      #chronic anemia stable  -monitor h/H    #hx HTN currently stable    # DVT prophylaxis   heparin SC    #overall poor prognosis - palliative team evaluation

## 2018-01-19 DIAGNOSIS — R33.9 RETENTION OF URINE, UNSPECIFIED: ICD-10-CM

## 2018-01-19 LAB
ALBUMIN SERPL ELPH-MCNC: 1.7 G/DL — LOW (ref 3.3–5)
ANION GAP SERPL CALC-SCNC: 10 MMOL/L — SIGNIFICANT CHANGE UP (ref 5–17)
BUN SERPL-MCNC: 72 MG/DL — HIGH (ref 7–23)
CALCIUM SERPL-MCNC: 8.5 MG/DL — SIGNIFICANT CHANGE UP (ref 8.5–10.1)
CHLORIDE SERPL-SCNC: 118 MMOL/L — HIGH (ref 96–108)
CO2 SERPL-SCNC: 21 MMOL/L — LOW (ref 22–31)
CREAT SERPL-MCNC: 4.39 MG/DL — HIGH (ref 0.5–1.3)
CULTURE RESULTS: SIGNIFICANT CHANGE UP
GLUCOSE BLDC GLUCOMTR-MCNC: 100 MG/DL — HIGH (ref 70–99)
GLUCOSE BLDC GLUCOMTR-MCNC: 120 MG/DL — HIGH (ref 70–99)
GLUCOSE BLDC GLUCOMTR-MCNC: 138 MG/DL — HIGH (ref 70–99)
GLUCOSE BLDC GLUCOMTR-MCNC: 166 MG/DL — HIGH (ref 70–99)
GLUCOSE SERPL-MCNC: 135 MG/DL — HIGH (ref 70–99)
PHOSPHATE SERPL-MCNC: 5.4 MG/DL — HIGH (ref 2.5–4.5)
POTASSIUM SERPL-MCNC: 5.1 MMOL/L — SIGNIFICANT CHANGE UP (ref 3.5–5.3)
POTASSIUM SERPL-SCNC: 5.1 MMOL/L — SIGNIFICANT CHANGE UP (ref 3.5–5.3)
SODIUM SERPL-SCNC: 149 MMOL/L — HIGH (ref 135–145)
SPECIMEN SOURCE: SIGNIFICANT CHANGE UP

## 2018-01-19 PROCEDURE — 93010 ELECTROCARDIOGRAM REPORT: CPT

## 2018-01-19 PROCEDURE — 50432 PLMT NEPHROSTOMY CATHETER: CPT | Mod: 50

## 2018-01-19 RX ORDER — SODIUM CHLORIDE 9 MG/ML
1000 INJECTION, SOLUTION INTRAVENOUS
Qty: 0 | Refills: 0 | Status: DISCONTINUED | OUTPATIENT
Start: 2018-01-19 | End: 2018-01-20

## 2018-01-19 RX ORDER — SODIUM CHLORIDE 9 MG/ML
1000 INJECTION INTRAMUSCULAR; INTRAVENOUS; SUBCUTANEOUS ONCE
Qty: 0 | Refills: 0 | Status: COMPLETED | OUTPATIENT
Start: 2018-01-19 | End: 2018-01-19

## 2018-01-19 RX ORDER — FENTANYL CITRATE 50 UG/ML
25 INJECTION INTRAVENOUS
Qty: 0 | Refills: 0 | Status: DISCONTINUED | OUTPATIENT
Start: 2018-01-19 | End: 2018-01-19

## 2018-01-19 RX ORDER — ONDANSETRON 8 MG/1
4 TABLET, FILM COATED ORAL ONCE
Qty: 0 | Refills: 0 | Status: DISCONTINUED | OUTPATIENT
Start: 2018-01-19 | End: 2018-01-19

## 2018-01-19 RX ORDER — ACETAMINOPHEN 500 MG
1000 TABLET ORAL ONCE
Qty: 0 | Refills: 0 | Status: COMPLETED | OUTPATIENT
Start: 2018-01-19 | End: 2018-01-19

## 2018-01-19 RX ORDER — SODIUM CHLORIDE 9 MG/ML
500 INJECTION INTRAMUSCULAR; INTRAVENOUS; SUBCUTANEOUS ONCE
Qty: 0 | Refills: 0 | Status: COMPLETED | OUTPATIENT
Start: 2018-01-19 | End: 2018-01-19

## 2018-01-19 RX ADMIN — MEROPENEM 100 MILLIGRAM(S): 1 INJECTION INTRAVENOUS at 17:23

## 2018-01-19 RX ADMIN — HEPARIN SODIUM 5000 UNIT(S): 5000 INJECTION INTRAVENOUS; SUBCUTANEOUS at 05:06

## 2018-01-19 RX ADMIN — SODIUM CHLORIDE 4000 MILLILITER(S): 9 INJECTION INTRAMUSCULAR; INTRAVENOUS; SUBCUTANEOUS at 16:30

## 2018-01-19 RX ADMIN — Medication 400 MILLIGRAM(S): at 16:15

## 2018-01-19 RX ADMIN — HEPARIN SODIUM 5000 UNIT(S): 5000 INJECTION INTRAVENOUS; SUBCUTANEOUS at 22:10

## 2018-01-19 RX ADMIN — Medication 1000 MILLIGRAM(S): at 17:23

## 2018-01-19 RX ADMIN — FENTANYL CITRATE 25 MICROGRAM(S): 50 INJECTION INTRAVENOUS at 17:24

## 2018-01-19 RX ADMIN — MEROPENEM 100 MILLIGRAM(S): 1 INJECTION INTRAVENOUS at 05:07

## 2018-01-19 RX ADMIN — SODIUM CHLORIDE 100 MILLILITER(S): 9 INJECTION, SOLUTION INTRAVENOUS at 18:32

## 2018-01-19 RX ADMIN — FENTANYL CITRATE 25 MICROGRAM(S): 50 INJECTION INTRAVENOUS at 16:05

## 2018-01-19 RX ADMIN — SODIUM CHLORIDE 500 MILLILITER(S): 9 INJECTION INTRAMUSCULAR; INTRAVENOUS; SUBCUTANEOUS at 18:16

## 2018-01-19 NOTE — CONSULT NOTE ADULT - SUBJECTIVE AND OBJECTIVE BOX
HPI: Mr. Sauer is an 82y old Male coming from [place] with hx of dementia with behavioral disturbance (FAST7c), depression, HTN, BPH w/ loredo, recent admission for ARF now admitted 1/15 for abnormal labs (elevated cr) at SNF. Found to have PRABHU  due to obstructive uropathy, UTI, bacteremia (Proteus), CTc/a/p showing RLL infiltrate and gas in scrotum concerning for abscess with severe bl hydro. Now s/p urology eval of abscess and cysto showing proper placement of loredo with irrigation and no need for incision for drainage. Palliative medicine consulted for assistance with goals of care, pt known to team from last admission.     Met Mr. Sauer this am, with 1:1 at bedside. Pt awake, attends, but does not respond verbally to questions, and is mildly agitated when touched. Otherwise appears comfortable. No issues outside of this occasional agitation as per staff.     Palliative SW reached out to son Bob who confirmed meeting time for tomorrow at 11am with Dr. Sacks-Berg who will be covering.      PAIN: ( )Yes   ( x)No- no nonverbal signs of pain  DYSPNEA: ( ) Yes  (x ) No- no nonverbal signs of dyspnea  Level:    PAST MEDICAL & SURGICAL HISTORY:  Nasal bone fractures  Posture abnormality  Gait abnormality  Dementia with behavioral disturbance, unspecified dementia type  Macular degeneration  Depression  Hypertension  Dementia  No significant past surgical history      SOCIAL HX: Lives at snf    Hx opiate tolerance ( )YES  (x )NO    Baseline ADLs  (Prior to Admission)- as per snf paperwork, pt dependent for all ADLs, disoriented, nonambulatory, combative, and incontinent at baseline  ( ) Independent   (x )Dependent    FAMILY HISTORY:  No pertinent family history in first degree relatives      Review of Systems:    Unable to gather due to end stage dementia      PHYSICAL EXAM:    Vital Signs Last 24 Hrs  T(C): 37.5 (19 Jan 2018 09:01), Max: 37.5 (19 Jan 2018 09:01)  T(F): 99.5 (19 Jan 2018 09:01), Max: 99.5 (19 Jan 2018 09:01)  HR: 80 (19 Jan 2018 08:00) (66 - 87)  BP: 100/52 (19 Jan 2018 08:00) (90/51 - 136/68)  BP(mean): 63 (19 Jan 2018 08:00) (63 - 84)  RR: 23 (19 Jan 2018 08:00) (15 - 23)  SpO2: 99% (19 Jan 2018 08:00) (96% - 100%)  Daily     Daily     PPSV2: 20  %  FAST:7b    General: Elderly male lying in bed, attends, awake, does not respond verbally or follow commands, but allows exam with minimal agitation  Mental Status: unable to gather  HEENT: mmm, perrl , eomi, temporal wasting  Lungs: dec at bases  Cardiac: +s1 s2 rrr  GI: soft nt nd +bs  : lroedo in place  Ext: no edema  Neuro: limited by mental status, does not follow commands, responds to touch, moves all extremities spontaneously      LABS:                        8.7    11.2  )-----------( 169      ( 17 Jan 2018 11:37 )             27.9     01-19    149<H>  |  118<H>  |  72<H>  ----------------------------<  135<H>  5.1   |  21<L>  |  4.39<H>    Ca    8.5      19 Jan 2018 06:32  Phos  5.4     01-19    TPro  x   /  Alb  1.7<L>  /  TBili  x   /  DBili  x   /  AST  x   /  ALT  x   /  AlkPhos  x   01-19      Albumin: Albumin, Serum: 1.7 g/dL (01-19 @ 06:32)      Allergies    No Known Allergies    Intolerances      MEDICATIONS  (STANDING):  aspirin enteric coated 81 milliGRAM(s) Oral daily  dextrose 5%. 1000 milliLiter(s) (50 mL/Hr) IV Continuous <Continuous>  dextrose 50% Injectable 12.5 Gram(s) IV Push once  dextrose 50% Injectable 25 Gram(s) IV Push once  dextrose 50% Injectable 25 Gram(s) IV Push once  finasteride 5 milliGRAM(s) Oral daily  heparin  Injectable 5000 Unit(s) SubCutaneous every 8 hours  insulin lispro (HumaLOG) corrective regimen sliding scale   SubCutaneous three times a day before meals  meropenem IVPB 500 milliGRAM(s) IV Intermittent every 12 hours  tamsulosin 0.4 milliGRAM(s) Oral at bedtime    MEDICATIONS  (PRN):  dextrose Gel 1 Dose(s) Oral once PRN Blood Glucose LESS THAN 70 milliGRAM(s)/deciliter  glucagon  Injectable 1 milliGRAM(s) IntraMuscular once PRN Glucose LESS THAN 70 milligrams/deciliter      RADIOLOGY/ADDITIONAL STUDIES:    CT ABDOMEN AND PELVIS                        PROCEDURE DATE:  01/18/2018      IMPRESSION:       Perforation of the bulbous urethral with multiple collections in and   adjacent to the penis, the largest at the inferior base of the penis.    Severe bilateral hydroureteronephrosis.    Findings discussed with Dr. Young on January 18, 2018, immediately after   the examination was ended.    PAULINO JUAREZ   This document has been electronically signed. Jan 18 2018  5:29PM         CT ABDOMEN AND PELVIS                        EXAM:  CT CHEST                        PROCEDURE DATE:  01/15/2018     IMPRESSION:   Bilateral hydroureteronephrosis and bladder wall thickening are   unchanged, but there has been interval development of some scrotal gas   suggestive of an infectious, or traumatic process for which clinical   correlation is recommended. Continued application of the Loredo catheter    in the bladder.  Other findings as above are unchanged.        KOFI JOHNS M.D., ATTENDING RADIOLOGIST  This document has been electronically signed. Jan 16 2018  8:42AM

## 2018-01-19 NOTE — PROGRESS NOTE ADULT - ASSESSMENT
84yo M w/PMHx of HTN, Dementia, Major Depressive  Disorder, BPH with indwelling loredo -BIBA from [facility] for abnormal labs . Per paperwork: baseline dementia, disoriented and non ambulatory at baseline . Labs reviewed from paperwork: January 8th 2018 Cr - 2.8 January 12th 2018 Cr - 3.24 and 1/15 Cr: 4.38; Unable to obtain history from patient due to dementia; admitted dec 2017 for acute renal failure from obstructive uropathy and dehydration and proteus sepsis/UTI with blood cx positive for proteus -completed course of ceftin in rehab, in ER, afebrile, normal wbc ct, UA grossly positive, elevated LA 4.5, loredo was replaced in ER with 1000 cc or urine output and 650 cc this am 1/16, had traumatic loredo placement with some bloody discharge around penis, CT abd/pelvis/chest showed posterior RLL infiltrates ? pna and some gas in scrotum which could be from loredo placement along with thickening in bladder wall, was given IV vanco/zosyn/rocephin.     1. E faecalis sepsis/ESBL Ecoli UTI/RLL PNA/obstructive uropathy/avel/BPH with chronic indwelling loredo/scrotal/penile abscess  - slowly improving  - urology eval appreciated  - f/u or cx  - blood cx growing e faecelis source - likely  related  - urine cx growing ESBL ecoli UTI  - completed 2 days of zosyn  - on meropenem [id number] #2  - continue with antibiotic coverage   - repeat blood cx 1/17 no growth  - TTE wnl  - monitor temps  - tolerating abx well so far; no side effects noted.  - reason for abx use and side effects reviewed with patient  - f/u cbc  - supportive care    2. other issues - care per medicine

## 2018-01-19 NOTE — PROGRESS NOTE ADULT - ASSESSMENT
83M.  admitted 01/15/18.  presents from [facility] to ED due to abnormal lab findings.    PMHx:  HTN;  BPH;  dementia;  major depressive disorder.    PRABHU secondary to obstructive uropathy (BPH) + hypovolemia.  -Cr 4.39.  -CT AB/pelvis, severe b/l HN.  -trend BMP.  -c/w IVFs.  -Nephrology following.    perineal abscess.    -UCx 100K ESBL E. coli.  -01/18 incision and drainage of abscess of perineum.  -c/w meropenem.  -Urology following.    urethral perforation.    -secondary to traumatic Chance removal by patient.  -c/w tamsulosin.  -may require PCN.  family to decide.    bacteremia.  -01/17 BCx, no growth.  -01/15 BCx (+) E. faecalis.  -01/16 TTE, no vegetation.  -c/w IV ABx.  -ID following.    normocytic anemia.  -trend CBC.  -stool OB.  -Fe studies, B12, folate.    hx type 2 DM.    DVT prophylaxis.  -UFH sq q8h.    disposition.  -SD.    communication.  -d/w RN.

## 2018-01-19 NOTE — CONSULT NOTE ADULT - ASSESSMENT
82y old Male coming from [place] with hx of dementia with behavioral disturbance (FAST7c), depression, HTN, BPH w/ loredo, recent admission for ARF now admitted 1/15 for abnormal labs (elevated cr) at SNF. Found to have PRABHU  due to obstructive uropathy, UTI, bacteremia (Proteus), CTc/a/p showing RLL infiltrate and gas in scrotum concerning for abscess with severe bl hydro. Now s/p urology eval of abscess and cysto showing proper placement of loredo with irrigation and no need for incision for drainage. Palliative medicine consulted for assistance with goals of care, pt known to team from last admission.     1)AMS  -Related to underlying dementia with behavioral disturbance complicated by environmental change, uremia, infection  -on 1:1 for safety  -Currently on enhanced supervision for safety  -Fall precautions  - prefer conservative measures for combativeness, if needed can consider haldol 0.5 mg IV prn  -avoid anticholinergic meds as reasonable as can exacerbate delirium    2)PRABHU/severe hydro  - and renal notes appreciated  -Imaging reviewed  -Obstructive uropathy and bladder wall thickening  -Pt s/p loredo interrogation and cysto  -On IVFs  -Per renal pt poor candidate for HD due to behavioral disturbance  - Cr rising    3)Bacteremia  -on IV abx  -WBC normal, lactate initially elevated at 4.5    4)Dementia/Debility  -Chart indicates hx of behavioral disturbance  -Appears to be FAST 7c, due to nonambulatory status  - Fall and aspiration precaution  - Currently on enhanced supervision due to delirium  - inability to maintain adequate intake, nutrition notes appreciated, albumin 1.7 and severe protein calorie malnutrition    5) Prognosis  - poor  - in light of end stage dementia with expected complications of recurrent UTI, loredo, bacteremia and now worsening Prabhu, PPS<40%, and inability to maintain adequate nutrition pt fits criteria for hospice    6)GOC/Advance Directives  -Pt does not have capacity to make medical decisions due to dementia  -No HCP on chart: surrogates are children 1) son Bob [phone] and Amanda Basurto  -Pt has no limits set on aggressive interventions at this time thus remain full code  - MOLST in chart as well only noting CPR  -GOC meeting set for tomorrow with children for 11am    Thank you for including us in Mr. Sauer's care. Will continue to follow with you.    Cruz Hendrickson MD  Palliative Care Attending

## 2018-01-19 NOTE — CHART NOTE - NSCHARTNOTEFT_GEN_A_CORE
This SW spoke with pts son Bob ([phone]) over the phone to schedule a family meeting. Dr. Sacks-Berg will meet with pts son and daughter tomorrow 1/20/18 at 11:00AM. Our team will continue to follow.

## 2018-01-19 NOTE — PROGRESS NOTE ADULT - ASSESSMENT
82 with a history of CKD, CVA, dementia, HTN here with hypernatremia and PRABHU, renal evaluation. PRABHU from dehydration    PRABHU  -Renal function rising, likely in the setting of obstruction  -Urology/IR s/p PCN  -Monitor UOP, renal function with drains in place  -Patient would not be a good candidate for HD as he would be high risk of pulling out a vascular access, son was in agreement during prior discussions of this point. Would need to be re-addressed if renal function progresses even with PCN    Hypernatremia  -Hypotonic IVF when bp stabilized  -Trend labs  -Speech/nutrition/palliative with severe malnutrition    Sepsis  -ID/cx/abx  -SD level of care currently, defer managment in post op state to anesthesia team if needs higher level of care    GOC need to be readdressed and paramount in this case    d/c with palliative team  d/c with RN in pacu    Grim long term prognosis

## 2018-01-19 NOTE — CHART NOTE - NSCHARTNOTEFT_GEN_A_CORE
called by anesthesiology, post procedure low BP, tachycardic and tachypneic.    patient reevaluated.  RN at bedside.    VSS  NAD.  CTA.  RRR  soft.  NT.  ND.  no edema.    c/w IVFs and ABx.  may transfer back to SD.  telemetry monitoring.    d/w RN.  d/w anesthesiology.

## 2018-01-19 NOTE — BRIEF OPERATIVE NOTE - PRE-OP DX
Hydronephrosis, bilateral  01/19/2018    Active  Grady Oreilly  Perineal abscess  01/18/2018    Active  Chivo Smith

## 2018-01-19 NOTE — BRIEF OPERATIVE NOTE - PROCEDURE
<<-----Click on this checkbox to enter Procedure Nephrostomy of both kidneys with imaging guidance  01/19/2018    Active  DAXELROD

## 2018-01-19 NOTE — BRIEF OPERATIVE NOTE - OPERATION/FINDINGS
Previous loredo currently not in position. It was in position on CT on 1/15/18. Enlarged scrotum that with pressure, purulent fluid drains thru meatus around loredo. So abscess is actually scrotal and at base of phallus.Loredo replaced and irrigates freely. Intraop cystogram suggests loredo in bladder. No actual incision made nor was needed to express purulent fluid. Perineum itself not involved. No fourniers gangrene.
bilat diverting 10fr pcns placed under sono and fluoro. ashanti pus from both. draining to gravity.

## 2018-01-20 LAB
-  AMIKACIN: SIGNIFICANT CHANGE UP
-  AMIKACIN: SIGNIFICANT CHANGE UP
-  AMPICILLIN/SULBACTAM: SIGNIFICANT CHANGE UP
-  AMPICILLIN/SULBACTAM: SIGNIFICANT CHANGE UP
-  AMPICILLIN: SIGNIFICANT CHANGE UP
-  AZTREONAM: SIGNIFICANT CHANGE UP
-  AZTREONAM: SIGNIFICANT CHANGE UP
-  CEFAZOLIN: SIGNIFICANT CHANGE UP
-  CEFAZOLIN: SIGNIFICANT CHANGE UP
-  CEFEPIME: SIGNIFICANT CHANGE UP
-  CEFEPIME: SIGNIFICANT CHANGE UP
-  CEFOXITIN: SIGNIFICANT CHANGE UP
-  CEFOXITIN: SIGNIFICANT CHANGE UP
-  CEFTAZIDIME: SIGNIFICANT CHANGE UP
-  CEFTAZIDIME: SIGNIFICANT CHANGE UP
-  CEFTRIAXONE: SIGNIFICANT CHANGE UP
-  CEFTRIAXONE: SIGNIFICANT CHANGE UP
-  CIPROFLOXACIN: SIGNIFICANT CHANGE UP
-  ERTAPENEM: SIGNIFICANT CHANGE UP
-  ERTAPENEM: SIGNIFICANT CHANGE UP
-  ERYTHROMYCIN: SIGNIFICANT CHANGE UP
-  GENTAMICIN: SIGNIFICANT CHANGE UP
-  GENTAMICIN: SIGNIFICANT CHANGE UP
-  IMIPENEM: SIGNIFICANT CHANGE UP
-  IMIPENEM: SIGNIFICANT CHANGE UP
-  LEVOFLOXACIN: SIGNIFICANT CHANGE UP
-  LEVOFLOXACIN: SIGNIFICANT CHANGE UP
-  MEROPENEM: SIGNIFICANT CHANGE UP
-  MEROPENEM: SIGNIFICANT CHANGE UP
-  PIPERACILLIN/TAZOBACTAM: SIGNIFICANT CHANGE UP
-  PIPERACILLIN/TAZOBACTAM: SIGNIFICANT CHANGE UP
-  TETRACYCLINE: SIGNIFICANT CHANGE UP
-  TOBRAMYCIN: SIGNIFICANT CHANGE UP
-  TOBRAMYCIN: SIGNIFICANT CHANGE UP
-  TRIMETHOPRIM/SULFAMETHOXAZOLE: SIGNIFICANT CHANGE UP
-  TRIMETHOPRIM/SULFAMETHOXAZOLE: SIGNIFICANT CHANGE UP
-  VANCOMYCIN: SIGNIFICANT CHANGE UP
ALBUMIN SERPL ELPH-MCNC: 1.6 G/DL — LOW (ref 3.3–5)
ANION GAP SERPL CALC-SCNC: 10 MMOL/L — SIGNIFICANT CHANGE UP (ref 5–17)
BUN SERPL-MCNC: 73 MG/DL — HIGH (ref 7–23)
CALCIUM SERPL-MCNC: 8.1 MG/DL — LOW (ref 8.5–10.1)
CHLORIDE SERPL-SCNC: 119 MMOL/L — HIGH (ref 96–108)
CO2 SERPL-SCNC: 19 MMOL/L — LOW (ref 22–31)
CREAT SERPL-MCNC: 4.57 MG/DL — HIGH (ref 0.5–1.3)
FERRITIN SERPL-MCNC: 1319 NG/ML — HIGH (ref 30–400)
FOLATE SERPL-MCNC: 14.3 NG/ML — SIGNIFICANT CHANGE UP (ref 4.8–24.2)
GLUCOSE BLDC GLUCOMTR-MCNC: 119 MG/DL — HIGH (ref 70–99)
GLUCOSE BLDC GLUCOMTR-MCNC: 162 MG/DL — HIGH (ref 70–99)
GLUCOSE BLDC GLUCOMTR-MCNC: 96 MG/DL — SIGNIFICANT CHANGE UP (ref 70–99)
GLUCOSE SERPL-MCNC: 132 MG/DL — HIGH (ref 70–99)
IRON SATN MFR SERPL: 10 % — LOW (ref 16–55)
IRON SATN MFR SERPL: 14 UG/DL — LOW (ref 45–165)
METHOD TYPE: SIGNIFICANT CHANGE UP
PHOSPHATE SERPL-MCNC: 6.1 MG/DL — HIGH (ref 2.5–4.5)
POTASSIUM SERPL-MCNC: 5.3 MMOL/L — SIGNIFICANT CHANGE UP (ref 3.5–5.3)
POTASSIUM SERPL-SCNC: 5.3 MMOL/L — SIGNIFICANT CHANGE UP (ref 3.5–5.3)
SODIUM SERPL-SCNC: 148 MMOL/L — HIGH (ref 135–145)
TIBC SERPL-MCNC: 135 UG/DL — LOW (ref 220–430)
UIBC SERPL-MCNC: 121 UG/DL — SIGNIFICANT CHANGE UP (ref 110–370)
VIT B12 SERPL-MCNC: 1113 PG/ML — SIGNIFICANT CHANGE UP (ref 232–1245)

## 2018-01-20 RX ORDER — SODIUM CHLORIDE 9 MG/ML
500 INJECTION, SOLUTION INTRAVENOUS
Qty: 0 | Refills: 0 | Status: DISCONTINUED | OUTPATIENT
Start: 2018-01-20 | End: 2018-01-20

## 2018-01-20 RX ORDER — ACETAMINOPHEN 500 MG
650 TABLET ORAL EVERY 6 HOURS
Qty: 0 | Refills: 0 | Status: DISCONTINUED | OUTPATIENT
Start: 2018-01-20 | End: 2018-01-25

## 2018-01-20 RX ORDER — SODIUM BICARBONATE 1 MEQ/ML
0.07 SYRINGE (ML) INTRAVENOUS
Qty: 50 | Refills: 0 | Status: DISCONTINUED | OUTPATIENT
Start: 2018-01-20 | End: 2018-01-21

## 2018-01-20 RX ORDER — ACETAMINOPHEN 500 MG
1000 TABLET ORAL ONCE
Qty: 0 | Refills: 0 | Status: COMPLETED | OUTPATIENT
Start: 2018-01-20 | End: 2018-01-20

## 2018-01-20 RX ADMIN — SODIUM CHLORIDE 100 MILLILITER(S): 9 INJECTION, SOLUTION INTRAVENOUS at 06:00

## 2018-01-20 RX ADMIN — MEROPENEM 100 MILLIGRAM(S): 1 INJECTION INTRAVENOUS at 17:08

## 2018-01-20 RX ADMIN — Medication 400 MILLIGRAM(S): at 23:29

## 2018-01-20 RX ADMIN — Medication 100 MEQ/KG/HR: at 14:13

## 2018-01-20 RX ADMIN — HEPARIN SODIUM 5000 UNIT(S): 5000 INJECTION INTRAVENOUS; SUBCUTANEOUS at 21:34

## 2018-01-20 RX ADMIN — HEPARIN SODIUM 5000 UNIT(S): 5000 INJECTION INTRAVENOUS; SUBCUTANEOUS at 14:14

## 2018-01-20 RX ADMIN — SODIUM CHLORIDE 500 MILLILITER(S): 9 INJECTION, SOLUTION INTRAVENOUS at 01:30

## 2018-01-20 RX ADMIN — MEROPENEM 100 MILLIGRAM(S): 1 INJECTION INTRAVENOUS at 06:00

## 2018-01-20 RX ADMIN — HEPARIN SODIUM 5000 UNIT(S): 5000 INJECTION INTRAVENOUS; SUBCUTANEOUS at 05:59

## 2018-01-20 RX ADMIN — Medication 650 MILLIGRAM(S): at 17:07

## 2018-01-20 RX ADMIN — Medication 1: at 18:20

## 2018-01-20 NOTE — PROGRESS NOTE ADULT - ASSESSMENT
82y old Male coming from [place] with hx of dementia with behavioral disturbance (FAST7c), depression, HTN, BPH w/ loredo, recent admission for ARF now admitted 1/15 for abnormal labs (elevated cr) at SNF. Found to have PRABHU  due to obstructive uropathy, UTI, bacteremia (Proteus), CTc/a/p showing RLL infiltrate and gas in scrotum concerning for abscess with severe bl hydro. Now s/p bilat PCN placement.      1)AMS  -Related to underlying vascular dementia with behavioral disturbance complicated by environmental change, uremia, infection  -on 1:1 for safety  -Currently on enhanced supervision for safety  -Fall precautions  - prefer conservative measures for combativeness, if needed can consider haldol 0.5 mg IV prn  -avoid anticholinergic meds as reasonable as can exacerbate delirium    2)PRABHU  -Secondary to severe hydronephrosis and dehydration and hypotension  - and renal notes appreciated  -Continue IV fluids as per nephrology  -Per renal pt poor candidate for HD due to behavioral disturbance - would likely pull IV access out and he would require sedation for the procedure - discussed with family (see today's GOC note)  -Cr rising - up to 4.57    3)Bacteremia  -Appreciate ID input  -on IV abx  -secondary to  source    4)Dementia/Debility  -Chart indicates hx of behavioral disturbance  -Family upset that the term "alzheimer's" has been used as they state their neurologist told them his mental changes were due to vascular problems -   -I discussed the difference between vascular and Alzheimer's dementia and they are ok with this now - they are not looking to change the documentation on the chart  -Appears to be FAST 7c, due to nonambulatory status  - Fall and aspiration precaution  - Currently on enhanced supervision due to delirium  - inability to maintain adequate intake, nutrition notes appreciated, albumin 1.7 and severe protein calorie malnutrition  -Pt would often pull on his Loredo, PCN tubes should be kept out of his reach and I would NOT consider HD catheters to be safe in this pt    5) Prognosis  - poor  - in light of end stage dementia with expected complications of recurrent UTI, loredo, bacteremia, worsening renal function,, PPS<40%, and inability to maintain adequate nutrition pt fits criteria for hospice - family not ready for this yet    6)GOC/Advance Directives  -Pt does not have capacity to make medical decisions due to dementia  -No HCP on chart: surrogates are children 1) son Bob [phone] and Neri Basurto  -Pt has no limits set on aggressive interventions at this time thus remain full code  - MOLST in chart as well only noting CPR - reviewed with family  -GOC meeting held today - see GOC note

## 2018-01-20 NOTE — PROGRESS NOTE ADULT - PROBLEM SELECTOR PLAN 1
continue bilateral nephrostomy tubes. This will be permanent for this pt. Discussed with pt's son and daughter. With placement of the nephrostomy tubes, pt reaching limit of medical intervention. Infection likely long term issue. Additional options extremely limited and prognosis poor.

## 2018-01-20 NOTE — PROGRESS NOTE ADULT - ASSESSMENT
83M.  admitted 01/15/18.  presents from [facility] to ED due to abnormal lab findings.    PMHx:  HTN;  BPH;  dementia;  major depressive disorder.    PRABHU secondary to obstructive uropathy (BPH) + hypovolemia/hypernatremia.  -Cr 4.57 (01/19 4.39).  -CT AB/pelvis, severe b/l HN.  -01/19 b/l PCN placed.  -trend BMP.  -c/w IVFs.  -Nephrology following.    perineal abscess.    -UCx 100K ESBL E. coli.  -01/18 incision and drainage of abscess of perineum.  -c/w meropenem.  -Urology following.    urethral perforation.    -01/20 Chance catheter removed.  -c/w tamsulosin.    bacteremia.  -01/17 BCx, no growth.  -01/15 BCx (+) E. faecalis.  -01/16 TTE, no vegetation.  -c/w IV ABx.  -ID following.    normocytic anemia.  -trend CBC.  -stool OB.  -Fe studies, B12, folate.    hx type 2 DM.  -correction insulin coverage.  -target 140-180.    DVT prophylaxis.  -UFH sq q8h.    disposition.  -SD.    communication.  -d/w RN.  -d/w Palliative Medicine.  -d/w patient's family.

## 2018-01-20 NOTE — PROVIDER CONTACT NOTE (OTHER) - SITUATION
pt insertion site dsg is becoming stained with blood .. nursing irrigated the tube without resistance. pt temp 102. suppository  tyelnol given   Hospitalist team ordered cooling blanket as well

## 2018-01-20 NOTE — PROVIDER CONTACT NOTE (OTHER) - ACTION/TREATMENT ORDERED:
rectal tyelnol supp ordered
per MD Smith - if dsg becomes completely saturated call him . if not just continue to watch it and irrigate if necessary

## 2018-01-20 NOTE — PROGRESS NOTE ADULT - ASSESSMENT
82 with a history of CKD, CVA, dementia, HTN here with hypernatremia, PRABHU with UTI, bactermic sepsis and obstructive uropathy   SBp 80's yesetrday     PRABHU with obsructive uropathy and hypotension/sepsis   - Renal function worsening -  likely in the setting of obstruction  - Monitor UOP, renal function with drains in place  - Patient would not be a good candidate for HD with underlying coomorbidites and altered mental status  - would be high risk of pulling out a vascular access.   Per Dr ivan conversation with son was in agreement during prior discussions.  - will need palliative care meeting  as renal function progresses even with PCN     Hypernatremia w metabolic Acodisis and borderline K -   - change to hypotonic IVF as Bp somehwat  improved - IV bicarb to antagonize K and control aciddemia   - Trend labs    Hyperphosphatemia    - start phos binders once taking po      Sepsis - GN bactaremia   - abx per ID     d/w palliative team  d/w RN in Stepdown    Prognosis poor long term

## 2018-01-21 LAB
-  AMIKACIN: SIGNIFICANT CHANGE UP
-  AMIKACIN: SIGNIFICANT CHANGE UP
-  AMPICILLIN/SULBACTAM: SIGNIFICANT CHANGE UP
-  AMPICILLIN/SULBACTAM: SIGNIFICANT CHANGE UP
-  AMPICILLIN: SIGNIFICANT CHANGE UP
-  AMPICILLIN: SIGNIFICANT CHANGE UP
-  AZTREONAM: SIGNIFICANT CHANGE UP
-  AZTREONAM: SIGNIFICANT CHANGE UP
-  CEFAZOLIN: SIGNIFICANT CHANGE UP
-  CEFAZOLIN: SIGNIFICANT CHANGE UP
-  CEFEPIME: SIGNIFICANT CHANGE UP
-  CEFEPIME: SIGNIFICANT CHANGE UP
-  CEFOXITIN: SIGNIFICANT CHANGE UP
-  CEFOXITIN: SIGNIFICANT CHANGE UP
-  CEFTAZIDIME: SIGNIFICANT CHANGE UP
-  CEFTAZIDIME: SIGNIFICANT CHANGE UP
-  CEFTRIAXONE: SIGNIFICANT CHANGE UP
-  CEFTRIAXONE: SIGNIFICANT CHANGE UP
-  CIPROFLOXACIN: SIGNIFICANT CHANGE UP
-  CIPROFLOXACIN: SIGNIFICANT CHANGE UP
-  ERTAPENEM: SIGNIFICANT CHANGE UP
-  ERTAPENEM: SIGNIFICANT CHANGE UP
-  GENTAMICIN: SIGNIFICANT CHANGE UP
-  GENTAMICIN: SIGNIFICANT CHANGE UP
-  IMIPENEM: SIGNIFICANT CHANGE UP
-  IMIPENEM: SIGNIFICANT CHANGE UP
-  LEVOFLOXACIN: SIGNIFICANT CHANGE UP
-  LEVOFLOXACIN: SIGNIFICANT CHANGE UP
-  MEROPENEM: SIGNIFICANT CHANGE UP
-  MEROPENEM: SIGNIFICANT CHANGE UP
-  NITROFURANTOIN: SIGNIFICANT CHANGE UP
-  NITROFURANTOIN: SIGNIFICANT CHANGE UP
-  PIPERACILLIN/TAZOBACTAM: SIGNIFICANT CHANGE UP
-  PIPERACILLIN/TAZOBACTAM: SIGNIFICANT CHANGE UP
-  TOBRAMYCIN: SIGNIFICANT CHANGE UP
-  TOBRAMYCIN: SIGNIFICANT CHANGE UP
-  TRIMETHOPRIM/SULFAMETHOXAZOLE: SIGNIFICANT CHANGE UP
-  TRIMETHOPRIM/SULFAMETHOXAZOLE: SIGNIFICANT CHANGE UP
ALBUMIN SERPL ELPH-MCNC: 1.8 G/DL — LOW (ref 3.3–5)
ANION GAP SERPL CALC-SCNC: 7 MMOL/L — SIGNIFICANT CHANGE UP (ref 5–17)
BUN SERPL-MCNC: 60 MG/DL — HIGH (ref 7–23)
CALCIUM SERPL-MCNC: 8.4 MG/DL — LOW (ref 8.5–10.1)
CHLORIDE SERPL-SCNC: 120 MMOL/L — HIGH (ref 96–108)
CO2 SERPL-SCNC: 25 MMOL/L — SIGNIFICANT CHANGE UP (ref 22–31)
CREAT SERPL-MCNC: 3.45 MG/DL — HIGH (ref 0.5–1.3)
CULTURE RESULTS: SIGNIFICANT CHANGE UP
GLUCOSE BLDC GLUCOMTR-MCNC: 162 MG/DL — HIGH (ref 70–99)
GLUCOSE BLDC GLUCOMTR-MCNC: 171 MG/DL — HIGH (ref 70–99)
GLUCOSE BLDC GLUCOMTR-MCNC: 174 MG/DL — HIGH (ref 70–99)
GLUCOSE BLDC GLUCOMTR-MCNC: 177 MG/DL — HIGH (ref 70–99)
GLUCOSE BLDC GLUCOMTR-MCNC: 195 MG/DL — HIGH (ref 70–99)
GLUCOSE SERPL-MCNC: 183 MG/DL — HIGH (ref 70–99)
HCT VFR BLD CALC: 27.8 % — LOW (ref 39–50)
HGB BLD-MCNC: 8.7 G/DL — LOW (ref 13–17)
MCHC RBC-ENTMCNC: 28.2 PG — SIGNIFICANT CHANGE UP (ref 27–34)
MCHC RBC-ENTMCNC: 31.2 GM/DL — LOW (ref 32–36)
MCV RBC AUTO: 90.4 FL — SIGNIFICANT CHANGE UP (ref 80–100)
METHOD TYPE: SIGNIFICANT CHANGE UP
METHOD TYPE: SIGNIFICANT CHANGE UP
ORGANISM # SPEC MICROSCOPIC CNT: SIGNIFICANT CHANGE UP
ORGANISM # SPEC MICROSCOPIC CNT: SIGNIFICANT CHANGE UP
PHOSPHATE SERPL-MCNC: 4 MG/DL — SIGNIFICANT CHANGE UP (ref 2.5–4.5)
PLATELET # BLD AUTO: 130 K/UL — LOW (ref 150–400)
POTASSIUM SERPL-MCNC: 4.5 MMOL/L — SIGNIFICANT CHANGE UP (ref 3.5–5.3)
POTASSIUM SERPL-SCNC: 4.5 MMOL/L — SIGNIFICANT CHANGE UP (ref 3.5–5.3)
RBC # BLD: 3.08 M/UL — LOW (ref 4.2–5.8)
RBC # FLD: 13.8 % — SIGNIFICANT CHANGE UP (ref 10.3–14.5)
SODIUM SERPL-SCNC: 152 MMOL/L — HIGH (ref 135–145)
SPECIMEN SOURCE: SIGNIFICANT CHANGE UP
WBC # BLD: 10.5 K/UL — SIGNIFICANT CHANGE UP (ref 3.8–10.5)
WBC # FLD AUTO: 10.5 K/UL — SIGNIFICANT CHANGE UP (ref 3.8–10.5)

## 2018-01-21 RX ORDER — SODIUM CHLORIDE 9 MG/ML
1000 INJECTION, SOLUTION INTRAVENOUS
Qty: 0 | Refills: 0 | Status: DISCONTINUED | OUTPATIENT
Start: 2018-01-21 | End: 2018-01-23

## 2018-01-21 RX ADMIN — HEPARIN SODIUM 5000 UNIT(S): 5000 INJECTION INTRAVENOUS; SUBCUTANEOUS at 21:44

## 2018-01-21 RX ADMIN — MEROPENEM 100 MILLIGRAM(S): 1 INJECTION INTRAVENOUS at 17:35

## 2018-01-21 RX ADMIN — HEPARIN SODIUM 5000 UNIT(S): 5000 INJECTION INTRAVENOUS; SUBCUTANEOUS at 05:26

## 2018-01-21 RX ADMIN — Medication 1: at 17:43

## 2018-01-21 RX ADMIN — Medication 1: at 05:33

## 2018-01-21 RX ADMIN — Medication 1: at 11:36

## 2018-01-21 RX ADMIN — HEPARIN SODIUM 5000 UNIT(S): 5000 INJECTION INTRAVENOUS; SUBCUTANEOUS at 13:14

## 2018-01-21 RX ADMIN — SODIUM CHLORIDE 100 MILLILITER(S): 9 INJECTION, SOLUTION INTRAVENOUS at 13:13

## 2018-01-21 RX ADMIN — MEROPENEM 100 MILLIGRAM(S): 1 INJECTION INTRAVENOUS at 05:26

## 2018-01-21 NOTE — PROGRESS NOTE ADULT - ASSESSMENT
82 with a history of CKD, CVA, dementia, HTN here with hypernatremia, PRABHU with UTI, bactermic sepsis and obstructive uropathy   SBp 80's yesetrday     PRABHU with obsructive uropathy and hypotension/GNR sepsis   - Renal function stabilizing post PCN insertion   - Monitor UOP, renal function with drains in place  - no acute indication for HD - hopefully witl not require this  - would be high risk of pulling out a vascular access.   Per Dr ivan conversation with son was in agreement during prior discussions.  - will need palliative care meeting  as renal function progresses even with PCN     Hypernatremia w metabolic Acodisis and borderline K -   - change to hypotonic IVF as Bp somehwat  improved - dc IV bicarb as acidemia improved   - Trend labs    Hyperphosphatemia    - improving as renal indices improve     Sepsis - GN bactaremia   - abx per ID     d/w RN in Stepdown    Prognosis poor long term 82 with a history of CKD, CVA, dementia, HTN here with hypernatremia, PRABHU with UTI, bactermic sepsis and obstructive uropathy   SBp 80's yesetrday     PRABHU with obsructive uropathy and hypotension/GNR sepsis   - Renal function stabilizing post PCN insertion   - Monitor UOP, renal function with drains in place   - I/O's - keep slightly positive w postobstructive diuresis   - no acute indication for HD - hopefully witl not require this  - would be high risk of pulling out a vascular access.   Per Dr Bishop's  conversation with son - he was in agreement during prior discussions.  - palliative care following      Hypernatremia w metabolic Acidosis and borderline K - improved   - change to D5W 1/3 saline  - arminda  - Not drinking water    - encourage po water when stable   - Trend labs    Hyperphosphatemia    - improving as renal indices improve     Sepsis - GN bactaremia   - abx per ID     d/w RN in Stepdown    Prognosis poor long term 82 with a history of CKD, CVA, dementia, HTN here with hypernatremia, PRABHU with UTI, bactermic sepsis and obstructive uropathy   SBp 80's yesetrday     PRABHU with obsructive uropathy and hypotension/GNR sepsis   - Renal function stabilizing post PCN insertion   - Monitor UOP, renal function with drains in place   - I/O's - keep slightly positive w postobstructive diuresis   - no acute indication for HD - hopefully witl not require this  - would be high risk of pulling out a vascular access.   Per Dr Bishop's  conversation with son - he was in agreement during prior discussions.  - palliative care following      Hypernatremia - Na rising as UOP drops and + fevers - increased insensible losses   - change to D5W   - Not drinking water   - encourage po water when stable   - Trend labs    Hyperphosphatemia    - improving as renal indices improve     Sepsis - GN bactaremia   - abx per ID     d/w RN in Stepdown    Prognosis poor long term

## 2018-01-21 NOTE — PROGRESS NOTE ADULT - ASSESSMENT
83M.  admitted 01/15/18.  presents from [facility] to ED due to abnormal lab findings.    PMHx:  HTN;  BPH;  dementia;  major depressive disorder.    PRABHU secondary to obstructive uropathy (BPH) + hypovolemia/hypernatremia.  -Cr improving, 3.45 today.    -CT AB/pelvis, severe b/l HN.  -01/19 b/l PCN placed.  -continue to trend BMP.  -c/w hypotonic IVFs.  -Nephrology following.    sepsis.  perineal abscess.    -UCx 100K ESBL E. coli.  -01/18 incision and drainage of abscess of perineum.  -c/w meropenem.  -Urology following.  -ID following.    urethral perforation.    -01/20 Chance catheter removed and nephrostomy tubes placed.    bacteremia.  -01/17 BCx, no growth.  -01/15 BCx (+) E. faecalis.  -01/16 TTE, no vegetation.  -c/w IV ABx.  -ID following.    normocytic anemia.  -HH stable.  no suggestion of acute blood loss anemia at current time.  -continue to trend CBC.  -stool OB.  -Fe studies, B12, folate.    hx type 2 DM.  -correction insulin coverage.  -target 140-180.    DVT prophylaxis.  -UFH sq q8h.    disposition.  -SD.  -overall prognosis is very poor.    communication.  -d/w RN.  -d/w Urology.

## 2018-01-22 LAB
-  AMPICILLIN: SIGNIFICANT CHANGE UP
-  CIPROFLOXACIN: SIGNIFICANT CHANGE UP
-  NITROFURANTOIN: SIGNIFICANT CHANGE UP
-  TETRACYCLINE: SIGNIFICANT CHANGE UP
-  VANCOMYCIN: SIGNIFICANT CHANGE UP
ALBUMIN SERPL ELPH-MCNC: 1.8 G/DL — LOW (ref 3.3–5)
ANION GAP SERPL CALC-SCNC: 10 MMOL/L — SIGNIFICANT CHANGE UP (ref 5–17)
BUN SERPL-MCNC: 49 MG/DL — HIGH (ref 7–23)
CALCIUM SERPL-MCNC: 8.5 MG/DL — SIGNIFICANT CHANGE UP (ref 8.5–10.1)
CHLORIDE SERPL-SCNC: 114 MMOL/L — HIGH (ref 96–108)
CO2 SERPL-SCNC: 24 MMOL/L — SIGNIFICANT CHANGE UP (ref 22–31)
CREAT SERPL-MCNC: 2.58 MG/DL — HIGH (ref 0.5–1.3)
CULTURE RESULTS: SIGNIFICANT CHANGE UP
CULTURE RESULTS: SIGNIFICANT CHANGE UP
GLUCOSE BLDC GLUCOMTR-MCNC: 134 MG/DL — HIGH (ref 70–99)
GLUCOSE BLDC GLUCOMTR-MCNC: 141 MG/DL — HIGH (ref 70–99)
GLUCOSE BLDC GLUCOMTR-MCNC: 153 MG/DL — HIGH (ref 70–99)
GLUCOSE BLDC GLUCOMTR-MCNC: 174 MG/DL — HIGH (ref 70–99)
GLUCOSE SERPL-MCNC: 175 MG/DL — HIGH (ref 70–99)
HCT VFR BLD CALC: 28.6 % — LOW (ref 39–50)
HGB BLD-MCNC: 9.1 G/DL — LOW (ref 13–17)
MCHC RBC-ENTMCNC: 28.5 PG — SIGNIFICANT CHANGE UP (ref 27–34)
MCHC RBC-ENTMCNC: 31.7 GM/DL — LOW (ref 32–36)
MCV RBC AUTO: 89.7 FL — SIGNIFICANT CHANGE UP (ref 80–100)
METHOD TYPE: SIGNIFICANT CHANGE UP
ORGANISM # SPEC MICROSCOPIC CNT: SIGNIFICANT CHANGE UP
PHOSPHATE SERPL-MCNC: 3.3 MG/DL — SIGNIFICANT CHANGE UP (ref 2.5–4.5)
PLATELET # BLD AUTO: 136 K/UL — LOW (ref 150–400)
POTASSIUM SERPL-MCNC: 3.8 MMOL/L — SIGNIFICANT CHANGE UP (ref 3.5–5.3)
POTASSIUM SERPL-SCNC: 3.8 MMOL/L — SIGNIFICANT CHANGE UP (ref 3.5–5.3)
RBC # BLD: 3.19 M/UL — LOW (ref 4.2–5.8)
RBC # FLD: 13.5 % — SIGNIFICANT CHANGE UP (ref 10.3–14.5)
SODIUM SERPL-SCNC: 148 MMOL/L — HIGH (ref 135–145)
SPECIMEN SOURCE: SIGNIFICANT CHANGE UP
SPECIMEN SOURCE: SIGNIFICANT CHANGE UP
WBC # BLD: 12 K/UL — HIGH (ref 3.8–10.5)
WBC # FLD AUTO: 12 K/UL — HIGH (ref 3.8–10.5)

## 2018-01-22 RX ORDER — MINERAL OIL
133 OIL (ML) MISCELLANEOUS ONCE
Qty: 0 | Refills: 0 | Status: COMPLETED | OUTPATIENT
Start: 2018-01-22 | End: 2018-01-22

## 2018-01-22 RX ORDER — FLUCONAZOLE 150 MG/1
100 TABLET ORAL DAILY
Qty: 0 | Refills: 0 | Status: DISCONTINUED | OUTPATIENT
Start: 2018-01-22 | End: 2018-01-23

## 2018-01-22 RX ADMIN — HEPARIN SODIUM 5000 UNIT(S): 5000 INJECTION INTRAVENOUS; SUBCUTANEOUS at 05:30

## 2018-01-22 RX ADMIN — Medication 133 MILLILITER(S): at 18:21

## 2018-01-22 RX ADMIN — HEPARIN SODIUM 5000 UNIT(S): 5000 INJECTION INTRAVENOUS; SUBCUTANEOUS at 21:41

## 2018-01-22 RX ADMIN — SODIUM CHLORIDE 100 MILLILITER(S): 9 INJECTION, SOLUTION INTRAVENOUS at 21:41

## 2018-01-22 RX ADMIN — Medication 1: at 08:14

## 2018-01-22 RX ADMIN — Medication 1: at 17:19

## 2018-01-22 RX ADMIN — MEROPENEM 100 MILLIGRAM(S): 1 INJECTION INTRAVENOUS at 18:21

## 2018-01-22 RX ADMIN — SODIUM CHLORIDE 100 MILLILITER(S): 9 INJECTION, SOLUTION INTRAVENOUS at 05:31

## 2018-01-22 RX ADMIN — SODIUM CHLORIDE 100 MILLILITER(S): 9 INJECTION, SOLUTION INTRAVENOUS at 10:04

## 2018-01-22 RX ADMIN — HEPARIN SODIUM 5000 UNIT(S): 5000 INJECTION INTRAVENOUS; SUBCUTANEOUS at 15:32

## 2018-01-22 RX ADMIN — Medication 650 MILLIGRAM(S): at 18:33

## 2018-01-22 RX ADMIN — MEROPENEM 100 MILLIGRAM(S): 1 INJECTION INTRAVENOUS at 05:30

## 2018-01-22 NOTE — PROGRESS NOTE ADULT - ASSESSMENT
82 with a history of CKD, CVA, dementia, HTN here with hypernatremia, PRABHU with UTI, bactermic sepsis and obstructive uropathy s/p PCN    PRABHU with obsructive uropathy and hypotension/GNR sepsis   - Renal function stabilizing post PCN insertion   - Monitor UOP, renal function with drains in place   - Keep IVF as he is not taking Po intake as per staff      Hypernatremia -   - Maintain dextrose based IVF, hypotonic fluid  - Long term plan for chronic limited/absent intake?    Sepsis - GN bactaremia   - abx per ID     d/w RN in Stepdown    Prognosis very poor long term   Palliative follow up

## 2018-01-22 NOTE — SWALLOW BEDSIDE ASSESSMENT ADULT - COMMENTS
The patient was admitted from [place] with renal failure which is suspectedly due to obstructive uropathy associated with BPH. Hospital course is notable for obstructive uropathy with Chance change, cystitis, ARF, right pneumonia and confusion with agitation. This profile is superimposed upon a prior history of advanced dementia with combativeness as well as chronic Dysphagia. Additional prior medical history is as stated below.
The patient was admitted from [place] with renal failure which is suspectedly due to obstructive uropathy associated with BPH. Hospital course is notable for obstructive uropathy, ureteral perforation, Chance removal/placement of nephrostomy tubes, ARF, hypernatremia, hypovolemia, right pneumonia and confusion with agitation. This profile is superimposed upon a prior history of advanced dementia with combativeness as well as chronic Dysphagia. Additional prior medical history is as stated below.

## 2018-01-22 NOTE — PROGRESS NOTE ADULT - ASSESSMENT
84yo M w/PMHx of HTN, Dementia, Major Depressive  Disorder, BPH with indwelling olredo -BIBA from [facility] for abnormal labs . Per paperwork: baseline dementia, disoriented and non ambulatory at baseline . Labs reviewed from paperwork: January 8th 2018 Cr - 2.8 January 12th 2018 Cr - 3.24 and 1/15 Cr: 4.38; Unable to obtain history from patient due to dementia; admitted dec 2017 for acute renal failure from obstructive uropathy and dehydration and proteus sepsis/UTI with blood cx positive for proteus -completed course of ceftin in rehab, in ER, afebrile, normal wbc ct, UA grossly positive, elevated LA 4.5, loredo was replaced in ER with 1000 cc or urine output and 650 cc this am 1/16, had traumatic loredo placement with some bloody discharge around penis, CT abd/pelvis/chest showed posterior RLL infiltrates ? pna and some gas in scrotum which could be from loredo placement along with thickening in bladder wall, was given IV vanco/zosyn/rocephin.     1. E faecalis sepsis/ESBL Ecoli UTI/RLL PNA/obstructive uropathy/avel/BPH with chronic indwelling loredo/scrotal/penile abscess  - urology eval appreciated, s/p B/L nephrostomy placement 1/19  - OR cx from scrotal abscess growing ESBL ecoli/E faecalis, morganella morganii,   - blood cx growing e faecalis source - d/t above  source  - urine cx growing ESBL ecoli UTI and candida albicans  - completed 2 days of zosyn  - on meropenem [id number] #[id number]  - add diflucan 100mg daily   - continue with antibiotic coverage   - repeat blood cx 1/17 no growth  - TTE wnl  - monitor temps  - tolerating abx well so far; no side effects noted.  - reason for abx use and side effects reviewed with patient  - f/u cbc  - supportive care  - palliative care following  - poor prognosis    2. other issues - care per medicine

## 2018-01-22 NOTE — SWALLOW BEDSIDE ASSESSMENT ADULT - ORAL PREPARATORY PHASE
Pt was confused/distractible/fidgety/oppositional at times when PO was offered. He also resistively closed mouth, yelled non specifically and spit at times when PO was offered.

## 2018-01-22 NOTE — PROGRESS NOTE ADULT - ASSESSMENT
83M.  admitted 01/15/18.  presents from [facility] to ED due to abnormal lab findings.    PMHx:  HTN;  BPH;  dementia;  major depressive disorder.    PRABHU secondary to obstructive uropathy (BPH) + hypovolemia/hypernatremia.  -Cr continues to improve, 2.58.      -CT AB/pelvis, severe b/l HN.  -01/19 b/l PCN placed.  -continue to trend BMP.  -c/w hypotonic IVFs.  -Nephrology following.    sepsis.  perineal abscess.    -UCx 100K ESBL E. coli.  -01/18 incision and drainage of abscess of perineum.  -c/w meropenem.  -Urology following.  -ID following.    urethral perforation.    -01/20 Chance catheter removed and nephrostomy tubes placed.    bacteremia.  -01/17 BCx, no growth.  -01/15 BCx (+) E. faecalis.  -01/16 TTE, no vegetation.  -c/w IV ABx.  -ID following.    normocytic anemia.  -HH stable.    -continue to trend CBC.    dysphagia.  -patient currently pouching his modified diet.  -speech pathology to reevaluate.    hx type 2 DM.  -correction insulin coverage.  -target 140-180.    DVT prophylaxis.  -UFH sq q8h.    disposition.  -may transfer to the general [facility].  -overall prognosis is very poor.    communication.  -d/w RN. 83M.  admitted 01/15/18.  presents from [facility] to ED due to abnormal lab findings.    PMHx:  HTN;  BPH;  dementia;  major depressive disorder.    PRABHU secondary to obstructive uropathy (BPH) + hypovolemia/hypernatremia.  -PO intake deteriorating.  -Cr continues to improve, 2.58.      -CT AB/pelvis, severe b/l HN.  -01/19 b/l PCN placed.  -continue to trend BMP.  -c/w hypotonic IVFs.  -Nephrology following.    sepsis.  perineal abscess.    -UCx 100K ESBL E. coli + candida albicans.  -01/18 incision and drainage of abscess of perineum.  -adding fluconazole.  -c/w meropenem.  -Urology following.  -ID following.    urethral perforation.    -01/20 Chance catheter removed and nephrostomy tubes placed.    bacteremia.  -01/17 BCx, no growth.  -01/15 BCx (+) E. faecalis.  -01/16 TTE, no vegetation.  -c/w IV ABx.  -ID following.    normocytic anemia.  -HH stable.    -continue to trend CBC.  -monitor hematuria from right PCN.    dysphagia.  -patient currently pouching his modified diet.  -speech pathology to reevaluate.    hx type 2 DM.  -correction insulin coverage.  -target 140-180.    DVT prophylaxis.  -UFH sq q8h.    disposition.  -may transfer to the general medical[facility].  -overall prognosis is very poor.  -no limits established to care.  full code at current time.  -Palliative Medicine input appreciated, appropriate for hospice.    communication.  -d/w RN.

## 2018-01-22 NOTE — SWALLOW BEDSIDE ASSESSMENT ADULT - NS SPL SWALLOW CLINIC TRIAL FT
Solids and thin liquids were not offered given dysphagic profile and considering that they were not in his food inventory at [facility].

## 2018-01-22 NOTE — SWALLOW BEDSIDE ASSESSMENT ADULT - SWALLOW EVAL: DIAGNOSIS
1) The patient demonstrates periodically reduced orientation to feeding with periodic psychogenically reduced willingness to accept PO(i.e aversive head turning, yelling, hand swatting, "spitting") atop chronic Oropharyngeal Dysphagia which subjectively appears to be a grossly functional condition with a restricted inventory of some modified food textures providing that pt is alert/calm when feeding.  Dysphagia is felt to be chronic due to dementia and feedability/severity of Dysphagia are apt to fluctuate with changes in alertness/mood/mentation. In any case, his oropharyngeal swallow integrity is still felt to be grossly WFL for some modified PO types when he is alert/calm and I suspect that he is at swallow baseline.  Profile places at nutrition risk.  2) Communicative competence is reduced in setting of chronic Cognitive Dysfunction due to dementia. Cognitive deficits are apt to appear heightened when his routine is changed/he is acutely ill. His need must typically be anticipated.

## 2018-01-22 NOTE — SWALLOW BEDSIDE ASSESSMENT ADULT - ORAL PHASE
Initiation of intra oral processing actions was latent and he sometimes held PO in mouth for prolonged time frames prior to initiating intra oral processing actions. Once oral cations were initiated, bolus formation/transfer were achieved via moderately prolonged/disorganized/discontinuous lingual pumping actions that were grossly mechanically functional with above limited PO types when he was alert/calm.  Piecemeal deglutition was evident. Mild tongue debris noted with pureed foods.

## 2018-01-22 NOTE — PROGRESS NOTE ADULT - ASSESSMENT
82y old Male coming from [place] with hx of dementia with behavioral disturbance (FAST7c), depression, HTN, BPH w/ loredo, recent admission for ARF now admitted 1/15 for abnormal labs (elevated cr) at SNF. Found to have PRABHU  due to obstructive uropathy, UTI, bacteremia (Proteus), CTc/a/p showing RLL infiltrate and gas in scrotum concerning for abscess with severe bl hydro. Now s/p urology eval of abscess and cysto showing proper placement of loredo with irrigation and no need for incision for drainage. Palliative medicine consulted for assistance with goals of care, pt known to team from last admission.     1)AMS  -Related to underlying dementia with behavioral disturbance complicated by environmental change, uremia, infection  -on 1:1 for safety  -Currently on enhanced supervision for safety  -Fall precautions  - also with poor po intake, not tolerating po meds, S&S assessment appreciated  - prefer conservative measures for combativeness, if needed can consider haldol 0.5 mg IV prn  -avoid anticholinergic meds as reasonable as can exacerbate delirium    2)PRABHU/severe hydro  - and renal notes appreciated  -Imaging reviewed  -Obstructive uropathy and bladder wall thickening  -Pt s/p loredo interrogation, cysto, and perc neph tube placement via IR  -On IVFs  -Per renal pt poor candidate for HD due to behavioral disturbance  - Cr stabilizing s/p neph tubes    3)Bacteremia  -on IV abx  -WBC normal, lactate initially elevated at 4.5    4)Dementia/Debility  -Chart indicates hx of behavioral disturbance  -Appears to be FAST 7c, due to nonambulatory status  - Fall and aspiration precaution  - Currently on enhanced supervision due to delirium  - inability to maintain adequate intake, nutrition notes appreciated, albumin 1.7 and severe protein calorie malnutrition    5) Prognosis  - poor  - in light of end stage dementia with expected complications of recurrent UTI, loredo, bacteremia and now worsening Prabhu, PPS<40%, and inability to maintain adequate nutrition pt fits criteria for hospice    6)GOC/Advance Directives  -Pt does not have capacity to make medical decisions due to dementia  -No HCP on chart: surrogates are children 1) son Bob [phone] and Amanda Basurto  -Pt has no limits set on aggressive interventions at this time thus remain full code  - MOLST in chart as well only noting CPR  -GOC meeting held 1/21- family still wanting no limits to be set. They are reviewing MOLST form and plan to fill it out tonight. Plan to touch base in am cc; completing this form and further GOC discussion. See GOC note for further details.     Thank you for including us in Mr. Sauer's care. Will continue to follow with you.    Cruz Hendrickson MD  Palliative Care Attending

## 2018-01-22 NOTE — SWALLOW BEDSIDE ASSESSMENT ADULT - ADDITIONAL RECOMMENDATIONS
1) NUTRITION FOLLOW UP AS PROFILE PLACES HIM AT NUTRITION RISK. I EXPECT PO INTAKE TO BE REDUCED AND PT SOMETIMES HOLDS FOOD IN HIS MOUTH FOR PROLONGED PERIODS OF TIME(BEHAVIORAL DUE TO DEMENTIA) WHICH WILL RESULT IN HIM OFTEN ACCEPTING SMALL AMOUNTS OF FOOD IN EXCESSIVELY LONG TIME FRAMES. FURTHER, THAT PATIENT MAY BE NON FEEDABLE AT TIMES. I DO NOT FEEL THAT PLACEMENT OF A FEEDING TUBE WOULD NECESSARILY ENHANCE HIS LIFE QUALITY GIVEN THE ADVANCEMENT OF HIS DEMENTIA.    2) NOTE THAT PT WAS FOLLOWED BY PALLIATIVE CARE DURING PAST HOSPITALIZATION. CONSULT PALLIATIVE CARE AS NEEDED. HIS DEMENTIA IS FELT TO BE END STAGE.     3) KEEP HIS HEAD OF BED AT SOMEWHAT OF AN UPRIGHT ANGLE WHEN LYING DOWN TO GUARD AGAINST SALIVA ASPIRATION. 1) NUTRITION FOLLOW UP AS PROFILE PLACES HIM AT NUTRITION RISK. I EXPECT PO INTAKE TO BE REDUCED AND PT SOMETIMES HOLDS FOOD IN HIS MOUTH FOR PROLONGED PERIODS OF TIME(BEHAVIORAL DUE TO DEMENTIA) WHICH WILL RESULT IN HIM OFTEN ACCEPTING SMALL AMOUNTS OF FOOD IN EXCESSIVELY LONG TIME FRAMES. FURTHER, THAT PATIENT MAY BE NON FEEDABLE AT TIMES. I DO NOT FEEL THAT PLACEMENT OF A FEEDING TUBE WOULD NECESSARILY ENHANCE HIS LIFE QUALITY GIVEN THE ADVANCEMENT OF HIS DEMENTIA. SUGGEST DIETITIAN MONITOR CALORIC INTAKE AND PROVIDE SUPPLEMENTS AS NEEDED, I REITERATE THAT I FEEL THAT PT'S DYSPHAGIA IS A SEQUEL OF HIS END STAGE DEMENTIA AND I DO NOT FORESEE PLACEMENT OF A FEEDING TUBE NECESSARILY ENHANCING HIS LIFE QUALITY.    2) NOTE THAT PT WAS FOLLOWED BY PALLIATIVE CARE DURING PAST HOSPITALIZATION. CONSULT PALLIATIVE CARE AS NEEDED. HIS DEMENTIA IS FELT TO BE END STAGE.     3) KEEP HIS HEAD OF BED AT SOMEWHAT OF AN UPRIGHT ANGLE WHEN LYING DOWN TO GUARD AGAINST SALIVA ASPIRATION.

## 2018-01-22 NOTE — SWALLOW BEDSIDE ASSESSMENT ADULT - SWALLOW EVAL: RECOMMENDED FEEDING/EATING TECHNIQUES
check mouth frequently for oral residue/pocketing/crush medication (when feasible)/maintain upright posture during/after eating for 30 mins

## 2018-01-22 NOTE — SWALLOW BEDSIDE ASSESSMENT ADULT - SWALLOW EVAL: SECRETION MANAGEMENT
Limited probed revealed that no drooling was noted but his reflexive cough was somewhat moist/produced with mildly weakened strength.

## 2018-01-22 NOTE — SWALLOW BEDSIDE ASSESSMENT ADULT - SLP GENERAL OBSERVATIONS
Pt seen at bedside. On encounter, 1;1 nursing supervision was being provided, Pt was arosuable but fatigued, withdrawn, internally distractible, restless and combative at times, He could not be directed to structured communication tasks and he often yelled without clear intent. However, he did periodically responsively verbalize when asked concrete personally relevant questions. At these limited times, his verbal responses were intelligible, and linguistically intact.  However, his utterances were typically brief, fragmented and contextually inappropriate consistent with Cognitive Dysfunction with chronic component associated with dementia. Pt was a poor historian.

## 2018-01-22 NOTE — SWALLOW BEDSIDE ASSESSMENT ADULT - PHARYNGEAL PHASE
Swallow trigger was timely to mildly latent and laryngeal lift on palpation during swallow trials was mildly to moderately reduced but felt to be grossly functional with above PO types. No behavioral aspiration signs exhibited on exam with pureed foods/nectar thick liquids.

## 2018-01-22 NOTE — CHART NOTE - NSCHARTNOTEFT_GEN_A_CORE
Summary:   82y old Male coming from [place] with hx of dementia with behavioral disturbance (FAST7c), depression, HTN, BPH w/ loredo, recent admission for ARF now admitted 1/15 for abnormal labs (elevated cr) at SNF. Found to have PRABHU  due to obstructive uropathy, UTI, bacteremia (Proteus), CTc/a/p showing RLL infiltrate and gas in scrotum concerning for abscess with severe bl hydro. Now s/p urology eval of abscess and cysto showing proper placement of loredo with irrigation and no need for incision for drainage.    Pt with poor prognosis and reported poor PO intake. Pt having difficulty swallowing Puree nectar thick diet (SLP being reconsulted). Palliative contacted son discussed artifical nutrition. MD discouraged use of artificial nutrition due to no improvement of QOL, son in agreement (RD agrees as well considering pt's prognosis). Pt with stage II pressure ulcer on buttocks, Screened for malnutrition. Rec: SLP revaluations, allow recommended textures for comfort measures.     Diet : Diet, Dysphagia 1 Pureed-Nectar Consistency Fluid (01-21-18 @ 12:30)      PO intake:  Poor    GI Issues: [ ] nausea  [ ] vomiting [ ] diarrhea [ ] constipation    Enteral /Parenteral Nutrition:     Intial Assessment Weight:  Current Weight: Weight (kg): 77.1 (01-15 @ 18:27), 66.7 (12-24 @ 20:30)  % Weight Change    Home Medication:Home Medications:  finasteride 5 mg oral tablet: 1 tab(s) orally once a day (03 Jan 2018 12:21)  TAMSULOSIN HCL 0.4 MG CAPSULE:  (25 Dec 2017 03:08)    Pertinent Medications: MEDICATIONS  (STANDING):  aspirin enteric coated 81 milliGRAM(s) Oral daily  dextrose 5%. 1000 milliLiter(s) (100 mL/Hr) IV Continuous <Continuous>  dextrose 50% Injectable 12.5 Gram(s) IV Push once  dextrose 50% Injectable 25 Gram(s) IV Push once  dextrose 50% Injectable 25 Gram(s) IV Push once  finasteride 5 milliGRAM(s) Oral daily  fluconAZOLE   Tablet 100 milliGRAM(s) Oral daily  heparin  Injectable 5000 Unit(s) SubCutaneous every 8 hours  insulin lispro (HumaLOG) corrective regimen sliding scale   SubCutaneous three times a day before meals  meropenem IVPB 500 milliGRAM(s) IV Intermittent every 12 hours  tamsulosin 0.4 milliGRAM(s) Oral at bedtime    MEDICATIONS  (PRN):  acetaminophen  Suppository 650 milliGRAM(s) Rectal every 6 hours PRN For Temp greater than 38.5 C (101.3 F)  dextrose Gel 1 Dose(s) Oral once PRN Blood Glucose LESS THAN 70 milliGRAM(s)/deciliter  glucagon  Injectable 1 milliGRAM(s) IntraMuscular once PRN Glucose LESS THAN 70 milligrams/deciliter    Pertinent Labs:  01-22 Na148 mmol/L<H> Glu 175 mg/dL<H> K+ 3.8 mmol/L Cr  2.58 mg/dL<H> BUN 49 mg/dL<H> Phos 3.3 mg/dL Alb 1.8 g/dL<L> PAB n/a           Skin: Stage II pressure ulcer buttocks    Estimated Needs:   [x] no change since previous assessment  [ ] recalculated:   Calories: 3060-8585  Protein: 77g- 92g    Previous Nutrition Diagnosis: Severe malnutrition      Nutrition Diagnosis is [x] ongoing  [ ] resolved [ ] not applicable          New Nutrition Diagnosis: [x] not applicable        Recommend    [ ] Change Diet To:    [ ] Nutrition Supplement    [ ] Nutrition Support    [x] Other: SLP consult for tolerable texture. Allow PO intake as tolerated for comfort. Continue to Clarify GOC       Monitoring and Evaluation:     [x] PO intake [x] Tolerance to diet prescription [x] weights [x] follow up per protocol    [ ] other:

## 2018-01-22 NOTE — SWALLOW BEDSIDE ASSESSMENT ADULT - SWALLOW EVAL: RECOMMENDED DIET
STILL SUGGEST A DYSPHAGIA 1 DIET WITH NECTAR THICK CONSISTENCIES, AS TOLERATED, AS HE APPEARS TO TOLERATE THESE FOOD CONSISTENCIES FROM AN OROPHARYNGEAL SWALLOWING STANCE WHEN ALERT/CALM AND PO TEXTURES ON THIS DIET TYPE ACCOMMODATES HIS DYSPHAGIA, NOTE THAT PT MUST BE ALERT/IN CALM STATE WHEN FEEDING WHICH IS OFTEN NOT THE CASE.

## 2018-01-22 NOTE — SWALLOW BEDSIDE ASSESSMENT ADULT - DIET PRIOR TO ADMI
The patient was reportedly on a puree texture diet with nectar thick liquids at [facility]. Note that pt was previously seen by this service on 1/16/18 during this hospitalizations at this facility and was diagnosed with Dysphagia/placed on a Dysphagia 1 Diet with Nectar Thick Liquids. The patient was reportedly on a puree texture diet with nectar thick liquids at [facility]. Note that pt was previously seen by this service at this facility 1/16/18 during this hospitalization and was diagnosed with Dysphagia/placed on a Dysphagia 1 Diet with Nectar Thick Liquids.

## 2018-01-23 LAB
ANION GAP SERPL CALC-SCNC: 8 MMOL/L — SIGNIFICANT CHANGE UP (ref 5–17)
BUN SERPL-MCNC: 40 MG/DL — HIGH (ref 7–23)
CALCIUM SERPL-MCNC: 8.1 MG/DL — LOW (ref 8.5–10.1)
CHLORIDE SERPL-SCNC: 109 MMOL/L — HIGH (ref 96–108)
CO2 SERPL-SCNC: 26 MMOL/L — SIGNIFICANT CHANGE UP (ref 22–31)
CREAT SERPL-MCNC: 2.08 MG/DL — HIGH (ref 0.5–1.3)
GLUCOSE BLDC GLUCOMTR-MCNC: 170 MG/DL — HIGH (ref 70–99)
GLUCOSE SERPL-MCNC: 164 MG/DL — HIGH (ref 70–99)
HCT VFR BLD CALC: 25.7 % — LOW (ref 39–50)
HGB BLD-MCNC: 8.1 G/DL — LOW (ref 13–17)
MAGNESIUM SERPL-MCNC: 1.6 MG/DL — SIGNIFICANT CHANGE UP (ref 1.6–2.6)
MCHC RBC-ENTMCNC: 27.7 PG — SIGNIFICANT CHANGE UP (ref 27–34)
MCHC RBC-ENTMCNC: 31.3 GM/DL — LOW (ref 32–36)
MCV RBC AUTO: 88.4 FL — SIGNIFICANT CHANGE UP (ref 80–100)
PHOSPHATE SERPL-MCNC: 3 MG/DL — SIGNIFICANT CHANGE UP (ref 2.5–4.5)
PLATELET # BLD AUTO: 133 K/UL — LOW (ref 150–400)
POTASSIUM SERPL-MCNC: 3.9 MMOL/L — SIGNIFICANT CHANGE UP (ref 3.5–5.3)
POTASSIUM SERPL-SCNC: 3.9 MMOL/L — SIGNIFICANT CHANGE UP (ref 3.5–5.3)
RBC # BLD: 2.91 M/UL — LOW (ref 4.2–5.8)
RBC # FLD: 13.6 % — SIGNIFICANT CHANGE UP (ref 10.3–14.5)
SODIUM SERPL-SCNC: 143 MMOL/L — SIGNIFICANT CHANGE UP (ref 135–145)
WBC # BLD: 10.6 K/UL — HIGH (ref 3.8–10.5)
WBC # FLD AUTO: 10.6 K/UL — HIGH (ref 3.8–10.5)

## 2018-01-23 RX ORDER — FLUCONAZOLE 150 MG/1
100 TABLET ORAL ONCE
Qty: 0 | Refills: 0 | Status: COMPLETED | OUTPATIENT
Start: 2018-01-23 | End: 2018-01-23

## 2018-01-23 RX ORDER — FLUCONAZOLE 150 MG/1
100 TABLET ORAL EVERY 24 HOURS
Qty: 0 | Refills: 0 | Status: DISCONTINUED | OUTPATIENT
Start: 2018-01-24 | End: 2018-01-25

## 2018-01-23 RX ORDER — FLUCONAZOLE 150 MG/1
TABLET ORAL
Qty: 0 | Refills: 0 | Status: DISCONTINUED | OUTPATIENT
Start: 2018-01-23 | End: 2018-01-25

## 2018-01-23 RX ADMIN — SODIUM CHLORIDE 100 MILLILITER(S): 9 INJECTION, SOLUTION INTRAVENOUS at 05:46

## 2018-01-23 RX ADMIN — MEROPENEM 100 MILLIGRAM(S): 1 INJECTION INTRAVENOUS at 05:46

## 2018-01-23 RX ADMIN — HEPARIN SODIUM 5000 UNIT(S): 5000 INJECTION INTRAVENOUS; SUBCUTANEOUS at 13:24

## 2018-01-23 RX ADMIN — HEPARIN SODIUM 5000 UNIT(S): 5000 INJECTION INTRAVENOUS; SUBCUTANEOUS at 05:46

## 2018-01-23 RX ADMIN — Medication 1: at 08:03

## 2018-01-23 RX ADMIN — FLUCONAZOLE 50 MILLIGRAM(S): 150 TABLET ORAL at 10:12

## 2018-01-23 RX ADMIN — HEPARIN SODIUM 5000 UNIT(S): 5000 INJECTION INTRAVENOUS; SUBCUTANEOUS at 21:48

## 2018-01-23 RX ADMIN — MEROPENEM 100 MILLIGRAM(S): 1 INJECTION INTRAVENOUS at 17:05

## 2018-01-23 NOTE — PROGRESS NOTE ADULT - ASSESSMENT
82yo M w/PMHx of HTN, Dementia, Major Depressive  Disorder, BPH with indwelling loredo -BIBA from [facility] for abnormal labs . Per paperwork: baseline dementia, disoriented and non ambulatory at baseline . Labs reviewed from paperwork: January 8th 2018 Cr - 2.8 January 12th 2018 Cr - 3.24 and 1/15 Cr: 4.38; Unable to obtain history from patient due to dementia; admitted dec 2017 for acute renal failure from obstructive uropathy and dehydration and proteus sepsis/UTI with blood cx positive for proteus -completed course of ceftin in rehab, in ER, afebrile, normal wbc ct, UA grossly positive, elevated LA 4.5, loredo was replaced in ER with 1000 cc or urine output and 650 cc this am 1/16, had traumatic loredo placement with some bloody discharge around penis, CT abd/pelvis/chest showed posterior RLL infiltrates ? pna and some gas in scrotum which could be from loredo placement along with thickening in bladder wall, was given IV vanco/zosyn/rocephin.     1. E faecalis sepsis/ESBL Ecoli UTI/RLL PNA/obstructive uropathy/avel/BPH with chronic indwelling loredo/scrotal/penile abscess  - remains lethargic, ill-appearing  - urology eval appreciated, s/p B/L nephrostomy placement 1/19  - OR cx from scrotal abscess growing ESBL ecoli/E faecalis, morganella morganii,   - blood cx growing e faecalis source - d/t above  source  - urine cx growing ESBL ecoli UTI and candida albicans  - completed 2 days of zosyn  - on meropenem [id number] #6  - on diflucan 100mg daily #2  - continue with antibiotic coverage   - repeat blood cx 1/17 no growth  - TTE wnl  - monitor temps  - tolerating abx well so far; no side effects noted.  - reason for abx use and side effects reviewed with patient  - f/u cbc  - supportive care  - palliative care following  - poor prognosis    2. other issues - care per medicine

## 2018-01-23 NOTE — PROVIDER CONTACT NOTE (OTHER) - NAME OF MD/NP/PA/DO NOTIFIED:
Urology consult Dr. Smith left msg with Priti/kush
Dr Anaya
Dr Arjun Bishop
Dr Hereida
Dr Rincon
MD Damico
Md Smith

## 2018-01-23 NOTE — PROGRESS NOTE ADULT - ASSESSMENT
82 with a history of CKD, CVA, dementia, HTN here with hypernatremia, PRABHU with UTI, bactermic sepsis and obstructive uropathy s/p PCN    PRABHU with obsructive uropathy and hypotension/GNR sepsis   - Renal function stabilizing post PCN insertion   - Monitor UOP, renal function with drains in place     Hypernatremia -   - Stop IVF, Na+ normalized. IVF is not a plan for absent intake, no nutritional value. Family plan regarding feeds and GOC.  - Palliative meeting today    Sepsis - GN bactaremia   - abx per ID     d/w RN in Stepdown    Prognosis very poor long term   Palliative follow up today

## 2018-01-23 NOTE — GOALS OF CARE CONVERSATION - PERSONAL ADVANCE DIRECTIVE - TREATMENT GUIDELINE COMMENT
MOLST decisions: DNR/DNI, Limited Medical, Send to hospital, No feeding tube, trial of IV fluids, Determine use or limitation of antibiotics, No Dialysis, No Pressors, Ok with transfusions. *Spent 40 minutes discussing GOC with family including Advance care planning, explanation and discussion of advance directives, reviewed MOLST and finalized DNR/DNI form.

## 2018-01-23 NOTE — PROGRESS NOTE ADULT - ASSESSMENT
82y old Male coming from [place] with hx of dementia with behavioral disturbance (FAST7c), depression, HTN, BPH w/ loredo, recent admission for ARF now admitted 1/15 for abnormal labs (elevated cr) at SNF. Found to have PRABHU  due to obstructive uropathy, UTI, bacteremia (Proteus), CTc/a/p showing RLL infiltrate and gas in scrotum concerning for abscess with severe bl hydro. Now s/p urology eval of abscess and cysto showing proper placement of loredo with irrigation and no need for incision for drainage. Palliative medicine consulted for assistance with goals of care, pt known to team from last admission.     1)AMS  -Related to underlying dementia with behavioral disturbance complicated by environmental change, uremia, infection  -on 1:1 for safety  -Currently on enhanced supervision for safety  -Fall precautions  - also with poor po intake, not tolerating po meds, S&S assessment appreciated  - prefer conservative measures for combativeness, if needed can consider haldol 0.5 mg IV prn  -avoid anticholinergic meds as reasonable as can exacerbate delirium    2)PRABHU/severe hydro  - and renal notes appreciated  -Imaging reviewed  -Obstructive uropathy and bladder wall thickening  -Pt s/p loredo interrogation, cysto, and perc neph tube placement via IR  -On IVFs  -Per renal pt poor candidate for HD due to behavioral disturbance  - Cr stabilizing s/p neph tubes    3)Bacteremia  -on IV abx  -WBC normal, lactate initially elevated at 4.5    4)Dementia/Debility  -Chart indicates hx of behavioral disturbance  -Appears to be FAST 7c, due to nonambulatory status  - Fall and aspiration precaution  - Currently on enhanced supervision due to delirium  - inability to maintain adequate intake, nutrition notes appreciated, albumin 1.7 and severe protein calorie malnutrition    5) Prognosis  - poor  - in light of end stage dementia with expected complications of recurrent UTI, loredo, bacteremia and now worsening Prabhu, PPS<40%, and inability to maintain adequate nutrition pt fits criteria for hospice    6)GOC/Advance Directives  -Pt does not have capacity to make medical decisions due to dementia  -No HCP on chart: surrogates are children 1) son Bob [phone] and Amanda Basurto  -Pt has no limits set on aggressive interventions at this time thus remain full code  - MOLST in chart as well only noting CPR  -GOC meeting held 1/21-plan to meet again today to finalize MOLST and overall plan.     Thank you for including us in Mr. Sauer's care. Will continue to follow with you.    Cruz Hendrickson MD  Palliative Care Attending

## 2018-01-23 NOTE — PROVIDER CONTACT NOTE (OTHER) - DATE AND TIME:
16-Jan-2018 08:23
15-Yonny-2018 22:26
15-Yonny-2018 22:31
15-Yonny-2018 22:34
20-Jan-2018 16:45
20-Jan-2018 19:47
23-Jan-2018 08:05

## 2018-01-23 NOTE — CHART NOTE - NSCHARTNOTEFT_GEN_A_CORE
HPI:  84yo M w/PMHx of HTN, Dementia, Major Depressive  Disorder, BPH with indwelling loredo  BIBA from [facility] for abnormal labs .   Per paperwork: baseline dementia, disoriented and non ambulatory at baseline .  (15 Yonny 2018 20:55)      PERTINENT PMH REVIEWED:  [ X ] YES [ ] NO           Primary Contact:    No HCP on chart: surrogates are children 1) monica Rojas [phone] and Amanda Basurto      HCP [  ] Surrogate [ X  ] Guardian [   ]    Mental Status: [ ] Alert  [  ] Oriented [  ] Confused [ X ] Lethargic [  ]  Concerns of Depression [  ]  Anxiety [   ]  Baseline ADLs (prior to admission):  Independent [ ] moderately [ ] fully   Dependent   [ ] moderately [ ]fully    Family Meeting attendees: pts monica Rojas, daughter Amanda, Dr. Hendrickson, Mina [facility] Palliative SW    Anticipated Grief: Patient [  ] Family [ X ]    Goals of Care: Comfort [  ] Rehabilitation [  ] Curative [  ] Life Prolonging [  ]    Previous Services: LTC at [facility]    ADVANCE DIRECTIVES:  [ ] YES [ ] NO   DNR [ ] YES [ ] NO  DNI [ ] YES [ ] NO Completed on:                     MOLST  [ ] YES [ ] NO   Completed on:  Living Will  [ ] YES [ ] NO   Completed on:    Anticipated D/C Plan:                   Summary:    Team met with pts family to follow up and complete MOLST. HPI:  84yo M w/PMHx of HTN, Dementia, Major Depressive  Disorder, BPH with indwelling loredo  BIBA from [place] [facility] for abnormal labs .   Per paperwork: baseline dementia, disoriented and non ambulatory at baseline .  (15 Yonny 2018 20:55)      PERTINENT PMH REVIEWED:  [ X ] YES [ ] NO           Primary Contact:    No HCP on chart: surrogates are children 1) son Bob [phone] and Amanda Basurto      HCP [  ] Surrogate [ X  ] Guardian [   ]    Mental Status: [ ] Alert  [  ] Oriented [  ] Confused [ X ] Lethargic [  ]    Family Meeting attendees: pts son Bob, daughter Amanda, Dr. Hendrickson, Mina [facility] Palliative LMSW    Anticipated Grief: Patient [  ] Family [ X ]    Previous Services: [facility]    ADVANCE DIRECTIVES:  [ X ] YES [ ] NO   DNR [ X ] YES [ ] NO  DNI [ X ] YES [ ] NO                  MOLST  [ X ] YES [ ] NO      Anticipated D/C Plan: LTC in a facility                    Summary:    Team met with pts family to follow up and complete MOLST. Pts current medical condition and goals of care discussed. Pts family previously met with Dr. Sacks-Berg.     Pts children discussed how they were hesitant to put a DNR in place as they were afraid that this would mean do not treat and that pt. would not be given the care he needs. Team explained that this was not the case and a DNR/DNI is very specific to resuscitation and intubation and does not have an effect on pts current treatment. Pts family expressed feeling relieved and having understanding now that this was explained in more detail. Pts children now consent to DNR/DNI.    Team also reviewed and discussed MOLST in detail. MOLST form completed and states DNR/DNI, Limited Medical, Send to hospital, No feeding tube, trial of IV fluids, Determine use or limitation of antibiotics, No Dialysis, No Pressors, Ok with transfusions.     Pt. was at [facility] and [facility] and family discussed their concerns and what they had been through with pt. there. Pts family has not made final decision if they want pt. to go back to [facility] for LTC. They report they will meeting with [facility] tomorrow to discuss their concerns and their goals for pt. and will then make decision. Pt. has Medicare/Medicaid. Pts daughter reports that she believes the Medicaid was transitioned from Community to Chronic.     Emotional support provided. Pts family is not ready to focus on comfort at this time however, hospice services were brought up for the future so family is aware of the services. Plan for LTC with Limited MOLST.

## 2018-01-23 NOTE — PROGRESS NOTE ADULT - ASSESSMENT
83M.  admitted 01/15/18.  presents from [facility] to ED due to abnormal lab findings.    PMHx:  HTN;  BPH;  dementia;  major depressive disorder.    PRABHU secondary to obstructive uropathy (BPH) + hypovolemia/hypernatremia.  -PO intake deteriorating.  -Na normalized and Cr continues to improve with IVFs.  however, patient not tolerating PO.  anticipate Na and Cr will again begin to rise.  will require PEG tube.  if not, palliative care advised.    -CT AB/pelvis, severe b/l HN.  -01/19 b/l PCN placed.  -continue to trend BMP.  -Nephrology following.    sepsis.  perineal abscess.    -UCx 100K ESBL E. coli + candida albicans.  -01/18 incision and drainage of abscess of perineum.  -adding fluconazole.  -c/w meropenem.  -Urology following.  -ID following.    urethral perforation.    -01/20 Chance catheter removed and nephrostomy tubes placed.    bacteremia.  -01/17 BCx, no growth.  -01/15 BCx (+) E. faecalis.  -01/16 TTE, no vegetation.  -c/w IV ABx.  -ID following.    normocytic anemia.  -HH stable.    -continue to trend CBC.  -monitor hematuria from right PCN.    dysphagia.  -patient currently pouching his modified diet.  -speech pathology to reevaluate.    DVT prophylaxis.  -UFH sq q8h.    disposition.  -may transfer to the general [facility].  -overall prognosis is very poor.  -no limits established to care.  full code at current time.  -Palliative Medicine input appreciated, appropriate for hospice.    communication.  -d/w RN.  -d/w Neprhology.

## 2018-01-23 NOTE — GOALS OF CARE CONVERSATION - PERSONAL ADVANCE DIRECTIVE - CONVERSATION DETAILS
Family optimistic that the pt will "bounce back" to previous level of functioning. They have witnessed his recovery from other illnesses and they are hopeful that he will show the same ability to "rally". In fact, they clearly stated that this was all they would allow themselves to consider.   They were very focused initially on having the diagnosis of "Alzheimer's" removed from the chart but we discussed his history and the neurologist's diagnosis of vascular problems, many small strokes causing his mental changes and they were ok with the diagnosis of vascular dementia.  Prognosis is uncertain - his creat is rising. The PCN tubes were just placed very recently so it is too soon to say. Hopefully his PRABHU will resolve as he is a VERY POOR CANDIDATE for hemodialysis. He is agitated and strong and pulled his Chance out, pulls at IV's, would likely pull HD cath out and exsanguinate quickly. Also he would require sedation for each treatment and most importantly, he would have no idea what was happening to him, it would not increase the quality of his life. I touched on this briefly while reviewing the MOLST form. Son had a negative reaction to seeing the form , "Oh, the DNR paper" - I explained he could choose CPR as well as DNR and then he looked at the second page and indicated that he and his sisters (1 missing today as she is ill) will review it and meet with us again to complete it.   If pt recovers enough to leave the hospital (which is how I phrased it), they want him to go to a SNF. Was a long-term resident. They don't want Donald or Shadia- referred to  on whatever floor he goes to after stepdown)  No limits are placed on pt's care at this time.
Met with family to readdress [facility]. They explained that they took time to review the MOLST form and made some decisions, desiring to meet with us to finalize these. They shared that they are not difficult people, and they understand that their father has end stage dementia, but are struggling themselves with being ready to accept the finality of this or the comfort measures that clinicians would expect to be in place to truly reflect this acceptance. In the past, pt has been able to bounce back from infections and set backs and they want the him to have the opportunity to bounce back to some extent now. However, they were already prepared to set some limits that would ensure some dignity moving forward while they contemplate a full comfort focus in the likely very near future.     They already made the decision that pt should be DNI, explaining that they hesitated to be agreeable to DNR because they always felt that it meant do not treat. They clarified that they do want to allow their father to have a natural death and after explaining that this allows that, in that specific situation, they were agreeable to putting this in place. Likewise, we recommended limited interventions as the focus of their care as they were interested in some limits, just not quite ready for full comfort. Also discussed his issues swallowing which they were able to fully articulate understanding of, including risk of aspiration, knowledge that food would be offered and pt would take what he wants, and offering to us their understand also that this is not starving him by not placing feeding tube but rather listening to his body. Agreed with them for trial of antibiotics for now. However, we implored them to consider that he is already at the point of being eligible for full comfort focus inclusive of hospice (even at LTC since he already has medicaid) thus they need to consider when repeated hospitalizations which are now inevitable is getting in the way of a natural and dignified death. They were thoughtful about this, sharing their trepidation with this necessary decision as well and for now confirming again that they would want him to return to the hospital, but also now with the knowledge that a comfort plan can be initiated from the ED and did not require admission. Lastly, they would like trial of fluids, but contend that in keeping with previous notion of desire for comfort and natural death they would not want dialysis, or pressors, but would be ok with transfusions if needed.     They agreed that plan will be to see how long pt needs to be on abx (also made aware that this was switched to IV due to issues with po, but that course may be able to be completed at LTC if otherwise continuing to improve and stable enough for dc. They want to speak with staff at [facility] as they felt he had good care there, and share MOLST form decisions with them to set what they call their new palliative expectations, before solidifying [facility] as LTC of choice for dc. Ultimately, they understand that as clinicians it is frustrating to see what we see and thus have anticipation for poor outcomes and not fiercely advocate for comfort just as they feel they are, however they are trying to come to peace with this situation, nearing the point of being ready for full comfort, and grateful for staff's patience with them through this difficult time.

## 2018-01-24 LAB
ANION GAP SERPL CALC-SCNC: 9 MMOL/L — SIGNIFICANT CHANGE UP (ref 5–17)
BUN SERPL-MCNC: 37 MG/DL — HIGH (ref 7–23)
CALCIUM SERPL-MCNC: 8 MG/DL — LOW (ref 8.5–10.1)
CHLORIDE SERPL-SCNC: 111 MMOL/L — HIGH (ref 96–108)
CO2 SERPL-SCNC: 24 MMOL/L — SIGNIFICANT CHANGE UP (ref 22–31)
CREAT SERPL-MCNC: 1.87 MG/DL — HIGH (ref 0.5–1.3)
GLUCOSE SERPL-MCNC: 110 MG/DL — HIGH (ref 70–99)
POTASSIUM SERPL-MCNC: 3.7 MMOL/L — SIGNIFICANT CHANGE UP (ref 3.5–5.3)
POTASSIUM SERPL-SCNC: 3.7 MMOL/L — SIGNIFICANT CHANGE UP (ref 3.5–5.3)
SODIUM SERPL-SCNC: 144 MMOL/L — SIGNIFICANT CHANGE UP (ref 135–145)

## 2018-01-24 RX ADMIN — HEPARIN SODIUM 5000 UNIT(S): 5000 INJECTION INTRAVENOUS; SUBCUTANEOUS at 13:49

## 2018-01-24 RX ADMIN — HEPARIN SODIUM 5000 UNIT(S): 5000 INJECTION INTRAVENOUS; SUBCUTANEOUS at 21:50

## 2018-01-24 RX ADMIN — HEPARIN SODIUM 5000 UNIT(S): 5000 INJECTION INTRAVENOUS; SUBCUTANEOUS at 05:47

## 2018-01-24 RX ADMIN — MEROPENEM 100 MILLIGRAM(S): 1 INJECTION INTRAVENOUS at 05:47

## 2018-01-24 RX ADMIN — MEROPENEM 100 MILLIGRAM(S): 1 INJECTION INTRAVENOUS at 17:52

## 2018-01-24 RX ADMIN — FLUCONAZOLE 50 MILLIGRAM(S): 150 TABLET ORAL at 09:23

## 2018-01-24 NOTE — PROGRESS NOTE ADULT - SUBJECTIVE AND OBJECTIVE BOX
82yo M w/PMHx of HTN, Dementia, Major Depressive  Disorder, BPH with indwelling loredo  BIBA from [facility] for abnormal labs . Per paperwork: baseline dementia, disoriented and non ambulatory at baseline . Labs reviewed from paperwork: 2018 Cr - 2.8 2018 Cr - 3.24 and 1/15 Cr: 4.38; Unable to obtain history from patient due to dementia; admitted dec 2017 for acute renal failure from obstructive uropathy and dehydration and proteus sepsis/UTI with blood cx positive for proteus -completed course of ceftin in rehab, in ER, afebrile, normal wbc ct, UA grossly positive, elevated LA 4.5, loredo was replaced in ER with 1000 cc or urine output and 650 cc this am , had traumatic loredo placement with some bloody discharge around penis, CT abd/pelvis/chest showed posterior RLL infiltrates ? pna and some gas in scrotum which could be from loredo placement along with thickening in bladder wall, was given IV vanco/zosyn/rocephin.       confused, urinating with loredo   no fevers, no abd pain or diarrhea    MEDICATIONS  (STANDING):  aspirin enteric coated 81 milliGRAM(s) Oral daily  dextrose 5%. 1000 milliLiter(s) (100 mL/Hr) IV Continuous <Continuous>  finasteride 5 milliGRAM(s) Oral daily  heparin  Injectable 5000 Unit(s) SubCutaneous every 8 hours  piperacillin/tazobactam IVPB. 3.375 Gram(s) IV Intermittent every 12 hours  tamsulosin 0.4 milliGRAM(s) Oral at bedtime      Vital Signs Last 24 Hrs  T(C): 36.8 (2018 11:13), Max: 37.2 (2018 23:25)  T(F): 98.3 (2018 11:13), Max: 98.9 (2018 23:25)  HR: 73 (2018 11:13) (73 - 88)  BP: 142/78 (2018 11:13) (120/78 - 153/77)  BP(mean): --  RR: 16 (2018 11:13) (16 - 20)  SpO2: 100% (2018 11:13) (97% - 100%)        PE:  Constitutional: frail looking  HEENT: NC/AT, EOMI, PERRLA  Neck: supple  Back: no tenderness  Respiratory: decreased breath sounds  Cardiovascular: S1S2 regular, no murmurs  Abdomen: soft, not tender, not distended, positive BS  Genitourinary: deferred, loredo in place   Rectal: deferred  Musculoskeletal: no muscle tenderness, no joint swelling or tenderness  Extremities: no pedal edema  Neurological: confused, combative, no focal deficits  Skin: no rashes    Labs:                                   8.7    10.1  )-----------( 157      ( 2018 06:07 )             27.8         157<H>  |  126<H>  |  86<H>  ----------------------------<  239<H>  4.5   |  23  |  3.82<H>    Ca    8.7      2018 06:07  Phos  3.6       Mg     2.4         TPro  6.9  /  Alb  1.8<L>  /  TBili  0.3  /  DBili  x   /  AST  6<L>  /  ALT  20  /  AlkPhos  160<H>            Urinalysis Basic - ( 15 Yonny 2018 17:21 )    Color: Yellow / Appearance: very cloudy / S.025 / pH: x  Gluc: x / Ketone: Trace  / Bili: Negative / Urobili: Negative mg/dL   Blood: x / Protein: 100 mg/dL / Nitrite: Negative   Leuk Esterase: Moderate / RBC: 25-50 /HPF / WBC >50   Sq Epi: x / Non Sq Epi: Occasional / Bacteria: Moderate    Culture - Urine (01.15.18 @ 17:21)    Specimen Source: .Urine None    Culture Results:   >100,000 CFU/ml Escherichia coli    Culture - Blood (01.15.18 @ 17:21)    -  Enterococcus species: Detec    Gram Stain:   Growth in aerobic bottle: Gram Positive Cocci in Pairs and Chains    Specimen Source: .Blood None    Organism: Blood Culture PCR    Culture Results:   Growth in aerobic bottle: Gram Positive Cocci in Pairs and Chains  ***Blood Panel PCR results on this specimen are available  approximately 3 hours after the Gram stain result.***  Gram stain, PCR, and/or culture results may not always  correspond due to difference in methodologies.  ************************************************************  This PCR assay was performed using [employer].  The following targets are tested for: Enterococcus,  vancomycin resistant enterococci, Listeria monocytogenes,  coagulase negative staphylococci, S. aureus,  methicillin resistant S. aureus, Streptococcus agalactiae  (Group B), S. pneumoniae, S. pyogenes (Group A),  Acinetobacter baumannii, Enterobacter cloacae, E. coli,  Klebsiella oxytoca, K. pneumoniae, Proteus sp.,  Serratia marcescens, Haemophilus influenzae,  Neisseria meningitidis, Pseudomonas aeruginosa, Candida  albicans, C. glabrata, C krusei, C parapsilosis,  C. tropicalis and the KPC resistance gene.    Organism Identification: Blood Culture PCR    Method Type: PCR            Radiology:  < from: CT Chest No Cont (01.15.18 @ 23:23) >    EXAM:  CT ABDOMEN AND PELVIS                          EXAM:  CT CHEST                            PROCEDURE DATE:  01/15/2018          INTERPRETATION:  Chest CT without contrast dated 1/15/2018.    COMPARISON: None available.    CLINICAL INFORMATION: Sepsis.    TECHNIQUE: Contiguous axial 2.5 mm slice thickness images of the chest   were obtained without intravenous contrast administration.    FINDINGS:    The airway shows normal caliber and contour with patent lumen.  Mild dependent atelectaticchanges in the dependent portions of the lower   lobes. Some superimposed infiltrates in the posterior right lower lobe is   suspected for which clinical correlation is recommended.    There is no pleural effusion or pneumothorax.    There are no mediastinal lymphadenopathy or masses.    The mediastinum great vessels are normal. Coronary arteries are   calcified.     The heart is normal. There is no pericardial effusion.    The bones , chronic compression deformity T12. Some degenerative changes   in the thoracolumbar spine.    IMPRESSION: Some infiltrates, and/or atelectatic changes in the right   lower lobe?,suggestive of mild pneumonia for which clinical correlation   is recommended.    Non contrast CT of the abdomen and pelvis dated 1/15/2018.     COMPARISON: 17.    CLINICAL HISTORY: Sepsis.    Technique: contiguous axial images were obtained with 2.5 mm slice   thickness without intravenous or oral contrast administration, which   limits the visualization of the intra-abdominalstructures.   Coronal and sagittal reformats were also submitted for interpretation.      FINDINGS:     There is no free intra-abdominal air or ascites.     The unopacified liver, spleen, pancreas, adrenal glands and gallbladder   are normal.         Advanced directives addressed: full resuscitation
82yo M w/PMHx of HTN, Dementia, Major Depressive  Disorder, BPH with indwelling loredo  BIBA from [facility] for abnormal labs . Per paperwork: baseline dementia, disoriented and non ambulatory at baseline . Labs reviewed from paperwork: January 8th 2018 Cr - 2.8 January 12th 2018 Cr - 3.24 and 1/15 Cr: 4.38; Unable to obtain history from patient due to dementia; admitted dec 2017 for acute renal failure from obstructive uropathy and dehydration and proteus sepsis/UTI with blood cx positive for proteus -completed course of ceftin in rehab, in ER, afebrile, normal wbc ct, UA grossly positive, elevated LA 4.5, loredo was replaced in ER with 1000 cc or urine output and 650 cc this am 1/16, had traumatic loredo placement with some bloody discharge around penis, CT abd/pelvis/chest showed posterior RLL infiltrates ? pna and some gas in scrotum which could be from loredo placement along with thickening in bladder wall, was given IV vanco/zosyn/rocephin.     was taken to OR per urology 1/18  found to have scrotal/penile abscess, purulent fluid expressed  loredo is still in place   no fevers    MEDICATIONS  (STANDING):  aspirin enteric coated 81 milliGRAM(s) Oral daily  dextrose 5%. 1000 milliLiter(s) (50 mL/Hr) IV Continuous <Continuous>  dextrose 50% Injectable 12.5 Gram(s) IV Push once  dextrose 50% Injectable 25 Gram(s) IV Push once  dextrose 50% Injectable 25 Gram(s) IV Push once  finasteride 5 milliGRAM(s) Oral daily  heparin  Injectable 5000 Unit(s) SubCutaneous every 8 hours  insulin lispro (HumaLOG) corrective regimen sliding scale SubCutaneous three times a day before meals  meropenem IVPB 500 milliGRAM(s) IV Intermittent every 12 hours  tamsulosin 0.4 milliGRAM(s) Oral at bedtime      Vital Signs Last 24 Hrs  T(C): 37.5 (19 Jan 2018 09:01), Max: 37.5 (19 Jan 2018 09:01)  T(F): 99.5 (19 Jan 2018 09:01), Max: 99.5 (19 Jan 2018 09:01)  HR: 80 (19 Jan 2018 08:00) (66 - 87)  BP: 100/52 (19 Jan 2018 08:00) (90/51 - 128/71)  BP(mean): 63 (19 Jan 2018 08:00) (63 - 84)  RR: 23 (19 Jan 2018 08:00) (15 - 23)  SpO2: 99% (19 Jan 2018 08:00) (96% - 100%)        PE:  Constitutional: frail looking  HEENT: NC/AT, EOMI, PERRLA  Neck: supple  Back: no tenderness  Respiratory: decreased breath sounds  Cardiovascular: S1S2 regular, no murmurs  Abdomen: soft, not tender, not distended, positive BS  Genitourinary: deferred, loredo in place   Rectal: deferred  Musculoskeletal: no muscle tenderness, no joint swelling or tenderness  Extremities: no pedal edema  Neurological: confused, no focal deficits  Skin: no rashes    Labs:                                                         8.7    11.2  )-----------( 169      ( 17 Jan 2018 11:37 )             27.9     01-19    149<H>  |  118<H>  |  72<H>  ----------------------------<  135<H>  5.1   |  21<L>  |  4.39<H>    Ca    8.5      19 Jan 2018 06:32  Phos  5.4     01-19    TPro  x   /  Alb  1.7<L>  /  TBili  x   /  DBili  x   /  AST  x   /  ALT  x   /  AlkPhos  x   01-19             Cultures:     Culture - Blood (01.17.18 @ 11:37)    Specimen Source: .Blood Blood-Peripheral/ONLY AEROBIC BOTTLE REC'D    Culture Results:   No growth to date.    Culture - Urine (01.15.18 @ 17:21)    -  Gentamicin: S 2    -  Imipenem: S <=1    -  Levofloxacin: R >4    -  Meropenem: S <=1    -  Nitrofurantoin: S <=32    -  Piperacillin/Tazobactam: R >64    -  Tobramycin: R >8    -  Trimethoprim/Sulfamethoxazole: R >2/38    -  Amikacin: I 32    -  Ampicillin: R >16    -  Ampicillin/Sulbactam: R >16/8    -  Aztreonam: R >16    -  Cefazolin: R >16    -  Cefepime: R >16    -  Cefoxitin: S <=4    -  Ceftazidime: R >16    -  Ceftriaxone: R >32    -  Ciprofloxacin: R >2    -  Ertapenem: S <=0.5    Specimen Source: .Urine None    Culture Results:   >100,000 CFU/ml Escherichia coli ESBL    Organism Identification: Escherichia coli ESBL    Organism: Escherichia coli ESBL    Method Type: BANG      Culture - Blood (01.17.18 @ 11:37)    Specimen Source: .Blood Blood-Peripheral/ONLY AEROBIC BOTTLE REC'D    Culture Results:   No growth to date.    Culture - Fungal, Other (01.18.18 @ 13:46)    Specimen Source: .Other Scrotal puss    Culture Results:   Testing in progress    Culture - Blood (01.15.18 @ 17:21)    -  Ampicillin: S <=2    Gram Stain:   Growth in aerobic bottle: Gram Positive Cocci in Pairs and Chains    -  Vancomycin: S 2    -  Gentamicin synergy: S    -  Enterococcus species: Detec    Specimen Source: .Blood None    Organism: Blood Culture PCR    Organism: Enterococcus faecalis    Culture Results:   Growth in aerobic bottle: Enterococcus faecalis  ***Blood Panel PCR results on this specimen are available  approximately 3 hours after the Gram stain result.***  Gram stain, PCR, and/or culture results may not always  correspond due to difference in methodologies.  ************************************************************  This PCR assay was performed using [employer].  The following targets are tested for: Enterococcus,  vancomycin resistant enterococci, Listeria monocytogenes,  coagulase negative staphylococci, S. aureus,  methicillin resistant S. aureus, Streptococcus agalactiae  (Group B), S. pneumoniae, S. pyogenes (Group A),  Acinetobacter baumannii, Enterobacter cloacae, E. coli,  Klebsiella oxytoca, K. pneumoniae, Proteussp.,  Serratia marcescens, Haemophilus influenzae,  Neisseria meningitidis, Pseudomonas aeruginosa, Candida  albicans, C. glabrata, C krusei, C parapsilosis,  C. tropicalis and the KPC resistance gene.    Organism Identification: Blood Culture PCR  Enterococcus faecalis    Method Type: PCR    Method Type: BANG        TTE no vegetations            Radiology:  < from: CT Chest No Cont (01.15.18 @ 23:23) >    EXAM:  CT ABDOMEN AND PELVIS                          EXAM:  CT CHEST                            PROCEDURE DATE:  01/15/2018          INTERPRETATION:  Chest CT without contrast dated 1/15/2018.    COMPARISON: None available.    CLINICAL INFORMATION: Sepsis.    TECHNIQUE: Contiguous axial 2.5 mm slice thickness images of the chest   were obtained without intravenous contrast administration.    FINDINGS:    The airway shows normal caliber and contour with patent lumen.  Mild dependent atelectaticchanges in the dependent portions of the lower   lobes. Some superimposed infiltrates in the posterior right lower lobe is   suspected for which clinical correlation is recommended.    There is no pleural effusion or pneumothorax.    There are no mediastinal lymphadenopathy or masses.    The mediastinum great vessels are normal. Coronary arteries are   calcified.     The heart is normal. There is no pericardial effusion.    The bones , chronic compression deformity T12. Some degenerative changes   in the thoracolumbar spine.    IMPRESSION: Some infiltrates, and/or atelectatic changes in the right   lower lobe?,suggestive of mild pneumonia for which clinical correlation   is recommended.    Non contrast CT of the abdomen and pelvis dated 1/15/2018.     COMPARISON: 12/28/17.    CLINICAL HISTORY: Sepsis.    Technique: contiguous axial images were obtained with 2.5 mm slice   thickness without intravenous or oral contrast administration, which   limits the visualization of the intra-abdominalstructures.   Coronal and sagittal reformats were also submitted for interpretation.      FINDINGS:     There is no free intra-abdominal air or ascites.     The unopacified liver, spleen, pancreas, adrenal glands and gallbladder   are normal.         Advanced directives addressed: full resuscitation
HPI:  84yo M w/PMHx of HTN, Dementia, Major Depressive  Disorder, BPH with indwelling loredo  BIBA from [facility] for abnormal labs .   Per paperwork: baseline dementia, disoriented and non ambulatory at baseline . Labs  from paperwork: 2018 Cr - 2.8   2018 Cr - 3.24 and Today- Cr: 4.38;  admitted dec 2017  for acute renal failure from obstructive uropathy and dehydration  and UTi with blood cx positive for proteus .completed course of ceftin in rehab          #Review of system- unable to obtain, pt is minimally communicative     PHYSICAL EXAM:    Vital Signs Last 24 Hrs  T(C): 36.8 (2018 11:13), Max: 37.2 (2018 23:25)  T(F): 98.3 (2018 11:13), Max: 98.9 (2018 23:25)  HR: 73 (2018 11:13) (73 - 88)  BP: 142/78 (2018 11:13) (120/78 - 153/67)  BP(mean): --  RR: 16 (2018 11:13) (16 - 20)  SpO2: 100% (2018 11:13) (97% - 100%)    GENERAL: comfortable   HEAD:  Atraumatic, Normocephalic  EYES: EOMI, PERRLA, conjunctiva and sclera clear  HEENT: Moist mucous membranes  NECK: Supple, No JVD  NERVOUS SYSTEM: Hard to comment,  alert, non focal,   CHEST/LUNG: Clear to auscultation bilaterally; No rales, rhonchi, wheezing, or rubs  HEART:S1S2 normal, no murmer  ABDOMEN: Soft, Nontender, Nondistended; Bowel sounds present  GENITOURINARY- Voiding, no palpable bladder  EXTREMITIES:  2+ Peripheral Pulses, No clubbing, cyanosis, or edema  MUSCULOSKELTAL- No muscle tenderness, No joint tenderness, no Joint swelling,   SKIN-no rash, no lesion  LYMPH Node- No palpable lymph node    LABS:                        8.7    11.2  )-----------( 169      ( 2018 11:37 )             27.9     -    150<H>  |  120<H>  |  72<H>  ----------------------------<  214<H>  4.1   |  23  |  3.41<H>    Ca    8.6      2018 11:37  Phos  3.6       Mg     2.4         TPro  6.9  /  Alb  1.8<L>  /  TBili  0.3  /  DBili  x   /  AST  6<L>  /  ALT  20  /  AlkPhos  160<H>      PT/INR - ( 15 Yonny 2018 17:21 )   PT: 13.5 sec;   INR: 1.24 ratio         PTT - ( 15 Yonny 2018 17:21 )  PTT:30.1 sec  Urinalysis Basic - ( 15 Yonny 2018 17:21 )    Color: Yellow / Appearance: very cloudy / S.025 / pH: x  Gluc: x / Ketone: Trace  / Bili: Negative / Urobili: Negative mg/dL   Blood: x / Protein: 100 mg/dL / Nitrite: Negative   Leuk Esterase: Moderate / RBC: 25-50 /HPF / WBC >50   Sq Epi: x / Non Sq Epi: Occasional / Bacteria: Moderate        CAPILLARY BLOOD GLUCOSE      POCT Blood Glucose.: 221 mg/dL (2018 11:21)  POCT Blood Glucose.: 252 mg/dL (2018 07:41)  POCT Blood Glucose.: 213 mg/dL (2018 05:42)  POCT Blood Glucose.: 211 mg/dL (2018 20:39)      CARDIAC MARKERS ( 2018 06:07 )  0.113 ng/mL / x     / 45 U/L / x     / x      CARDIAC MARKERS ( 15 Yonny 2018 22:13 )  0.142 ng/mL / x     / 48 U/L / x     / x      CARDIAC MARKERS ( 15 Yonny 2018 17:21 )  0.188 ng/mL / x     / x     / x     / x            Standing medicine  aspirin enteric coated 81 milliGRAM(s) Oral daily  dextrose 5%. 1000 milliLiter(s) IV Continuous <Continuous>  dextrose 5%. 1000 milliLiter(s) IV Continuous <Continuous>  dextrose 50% Injectable 12.5 Gram(s) IV Push once  dextrose 50% Injectable 25 Gram(s) IV Push once  dextrose 50% Injectable 25 Gram(s) IV Push once  dextrose Gel 1 Dose(s) Oral once PRN  finasteride 5 milliGRAM(s) Oral daily  glucagon  Injectable 1 milliGRAM(s) IntraMuscular once PRN  heparin  Injectable 5000 Unit(s) SubCutaneous every 8 hours  insulin lispro (HumaLOG) corrective regimen sliding scale   SubCutaneous three times a day before meals  piperacillin/tazobactam IVPB. 3.375 Gram(s) IV Intermittent every 12 hours  tamsulosin 0.4 milliGRAM(s) Oral at bedtime
Patient is a 82y Male who remains agitated per staff. Taking little to no PO a per staff    REVIEW OF SYSTEMS:    UTO due to dementia    MEDICATIONS  (STANDING):  aspirin enteric coated 81 milliGRAM(s) Oral daily  dextrose 5%. 1000 milliLiter(s) (100 mL/Hr) IV Continuous <Continuous>  dextrose 50% Injectable 12.5 Gram(s) IV Push once  dextrose 50% Injectable 25 Gram(s) IV Push once  dextrose 50% Injectable 25 Gram(s) IV Push once  finasteride 5 milliGRAM(s) Oral daily  heparin  Injectable 5000 Unit(s) SubCutaneous every 8 hours  insulin lispro (HumaLOG) corrective regimen sliding scale   SubCutaneous three times a day before meals  meropenem IVPB 500 milliGRAM(s) IV Intermittent every 12 hours  tamsulosin 0.4 milliGRAM(s) Oral at bedtime    MEDICATIONS  (PRN):  acetaminophen  Suppository 650 milliGRAM(s) Rectal every 6 hours PRN For Temp greater than 38.5 C (101.3 F)  dextrose Gel 1 Dose(s) Oral once PRN Blood Glucose LESS THAN 70 milliGRAM(s)/deciliter  glucagon  Injectable 1 milliGRAM(s) IntraMuscular once PRN Glucose LESS THAN 70 milligrams/deciliter        T(C): , Max: 37.9 (01-21-18 @ 10:00)  T(F): , Max: 100.3 (01-21-18 @ 17:45)  HR: 76 (01-22-18 @ 09:00)  BP: 113/64 (01-22-18 @ 09:00)  BP(mean): 75 (01-22-18 @ 09:00)  RR: 18 (01-22-18 @ 09:00)  SpO2: 100% (01-22-18 @ 09:00)  Wt(kg): --    01-21 @ 07:01 - 01-22 @ 07:00  --------------------------------------------------------  IN: 2400 mL / OUT: 1950 mL / NET: 450 mL    01-22 @ 07:01 - 01-22 @ 09:41  --------------------------------------------------------  IN: 0 mL / OUT: 300 mL / NET: -300 mL          PHYSICAL EXAM:    Constitutional: frail  HEENT: PERRLA, EOMI,  MMM  Neck: No LAD, No JVD  Respiratory: good aeration  Cardiovascular: S1 and S2, RRR  Gastrointestinal: BS+, soft, NT/ND  Extremities: No peripheral edema  Neurological: Alert, not following any command  : pcn  Skin: No rashes  Access: Not applicable        LABS:                        9.1    12.0  )-----------( 136      ( 22 Jan 2018 06:25 )             28.6     22 Jan 2018 06:25    148    |  114    |  49     ----------------------------<  175    3.8     |  24     |  2.58   21 Jan 2018 06:18    152    |  120    |  60     ----------------------------<  183    4.5     |  25     |  3.45   20 Jan 2018 05:10    148    |  119    |  73     ----------------------------<  132    5.3     |  19     |  4.57   19 Jan 2018 06:32    149    |  118    |  72     ----------------------------<  135    5.1     |  21     |  4.39     Ca    8.5        22 Jan 2018 06:25  Ca    8.4        21 Jan 2018 06:18  Ca    8.1        20 Jan 2018 05:10  Ca    8.5        19 Jan 2018 06:32  Phos  3.3       22 Jan 2018 06:25  Phos  4.0       21 Jan 2018 06:18  Phos  6.1       20 Jan 2018 05:10  Phos  5.4       19 Jan 2018 06:32    TPro  x      /  Alb  1.8    /  TBili  x      /  DBili  x      /  AST  x      /  ALT  x      /  AlkPhos  x      22 Jan 2018 06:25  TPro  x      /  Alb  1.8    /  TBili  x      /  DBili  x      /  AST  x      /  ALT  x      /  AlkPhos  x      21 Jan 2018 06:18  TPro  x      /  Alb  1.6    /  TBili  x      /  DBili  x      /  AST  x      /  ALT  x      /  AlkPhos  x      20 Jan 2018 05:10  TPro  x      /  Alb  1.7    /  TBili  x      /  DBili  x      /  AST  x      /  ALT  x      /  AlkPhos  x      19 Jan 2018 06:32          Urine Studies:          RADIOLOGY & ADDITIONAL STUDIES:
Patient is a 82y Male who remains with little to no Po intake    REVIEW OF SYSTEMS:    UTO dementia    MEDICATIONS  (STANDING):  aspirin enteric coated 81 milliGRAM(s) Oral daily  finasteride 5 milliGRAM(s) Oral daily  fluconAZOLE IVPB      heparin  Injectable 5000 Unit(s) SubCutaneous every 8 hours  meropenem IVPB 500 milliGRAM(s) IV Intermittent every 12 hours  tamsulosin 0.4 milliGRAM(s) Oral at bedtime    MEDICATIONS  (PRN):  acetaminophen  Suppository 650 milliGRAM(s) Rectal every 6 hours PRN For Temp greater than 38.5 C (101.3 F)        T(C): , Max: 36.8 (01-22-18 @ 13:56)  T(F): , Max: 98.2 (01-22-18 @ 13:56)  HR: 59 (01-23-18 @ 11:00)  BP: 100/58 (01-23-18 @ 11:00)  BP(mean): 66 (01-23-18 @ 11:00)  RR: 18 (01-23-18 @ 11:00)  SpO2: 98% (01-23-18 @ 11:00)  Wt(kg): --    01-22 @ 07:01  -  01-23 @ 07:00  --------------------------------------------------------  IN: 2300 mL / OUT: 1480 mL / NET: 820 mL          PHYSICAL EXAM:    Constitutional: frail, cachectic  HEENT: PERRLA, EOMI,  MMM  Neck: No LAD, No JVD  Respiratory: good aeration  Cardiovascular: S1 and S2, RRR  Gastrointestinal: BS+, soft, NT/ND  Extremities: No peripheral edema  Neurological: awake, does not follow command  : PCN  Skin: No rashes  Access: Not applicable        LABS:                        8.1    10.6  )-----------( 133      ( 23 Jan 2018 04:44 )             25.7     23 Jan 2018 04:44    143    |  109    |  40     ----------------------------<  164    3.9     |  26     |  2.08   22 Jan 2018 06:25    148    |  114    |  49     ----------------------------<  175    3.8     |  24     |  2.58   21 Jan 2018 06:18    152    |  120    |  60     ----------------------------<  183    4.5     |  25     |  3.45   20 Jan 2018 05:10    148    |  119    |  73     ----------------------------<  132    5.3     |  19     |  4.57     Ca    8.1        23 Jan 2018 04:44  Ca    8.5        22 Jan 2018 06:25  Ca    8.4        21 Jan 2018 06:18  Ca    8.1        20 Jan 2018 05:10  Phos  3.0       23 Jan 2018 04:44  Phos  3.3       22 Jan 2018 06:25  Phos  4.0       21 Jan 2018 06:18  Phos  6.1       20 Jan 2018 05:10  Mg     1.6       23 Jan 2018 04:44    TPro  x      /  Alb  1.8    /  TBili  x      /  DBili  x      /  AST  x      /  ALT  x      /  AlkPhos  x      22 Jan 2018 06:25  TPro  x      /  Alb  1.8    /  TBili  x      /  DBili  x      /  AST  x      /  ALT  x      /  AlkPhos  x      21 Jan 2018 06:18  TPro  x      /  Alb  1.6    /  TBili  x      /  DBili  x      /  AST  x      /  ALT  x      /  AlkPhos  x      20 Jan 2018 05:10          Urine Studies:          RADIOLOGY & ADDITIONAL STUDIES:
82y Male with PMHX of dementia , htn, cva and  now presenting with  PRABHU on CKD with UTI/obstructive uropathy     PCN draining well   Agitated, aid at bedside.     nonverbal     REVIEW OF SYSTEMS:    UTO secondary to confuision    MEDICATIONS  (STANDING):  aspirin enteric coated 81 milliGRAM(s) Oral daily  dextrose 50% Injectable 12.5 Gram(s) IV Push once  dextrose 50% Injectable 25 Gram(s) IV Push once  dextrose 50% Injectable 25 Gram(s) IV Push once  finasteride 5 milliGRAM(s) Oral daily  heparin  Injectable 5000 Unit(s) SubCutaneous every 8 hours  insulin lispro (HumaLOG) corrective regimen sliding scale   SubCutaneous three times a day before meals  meropenem IVPB 500 milliGRAM(s) IV Intermittent every 12 hours  sodium bicarbonate  Infusion 0.065 mEq/kG/Hr (100 mL/Hr) IV Continuous <Continuous>  tamsulosin 0.4 milliGRAM(s) Oral at bedtime    Vital Signs Last 24 Hrs  T(C): 37.9 (21 Jan 2018 10:00), Max: 39.8 (20 Jan 2018 17:00)  T(F): 100.2 (21 Jan 2018 10:00), Max: 103.7 (20 Jan 2018 17:00)  HR: 89 (21 Jan 2018 11:00) (71 - 107)  BP: 125/66 (21 Jan 2018 11:00) (95/45 - 151/61)  BP(mean): 79 (21 Jan 2018 11:00) (56 - 89)  RR: 19 (21 Jan 2018 11:00) (16 - 27)  SpO2: 97% (21 Jan 2018 11:00) (94% - 100%)      I&O's Summary    19 Jan 2018 07:01  -  20 Jan 2018 07:00  --------------------------------------------------------  IN: 2550 mL / OUT: 930 mL / NET: 1620 mL    I&O's Detail    20 Jan 2018 07:01  -  21 Jan 2018 07:00  --------------------------------------------------------  IN:    IV PiggyBack: 50 mL    sodium bicarbonate  Infusion: 1900 mL    sodium chloride 0.45%: 500 mL  Total IN: 2450 mL    OUT:    Drain: 2310 mL    Drain: 1600 mL  Total OUT: 3910 mL    Total NET: -1460 mL      21 Jan 2018 07:01  -  21 Jan 2018 11:59  --------------------------------------------------------  OUT:    Drain: 300 mL    Drain: 250 mL  Total OUT: 550 mL    Total NET: -50 mL      PHYSICAL EXAM:    Constitutional: frail  HEENT:  EOMI,  MMM  Neck: No LAD, No JVD  Respiratory: lj hansen  Cardiovascular: S1 and S2, RRR  Gastrointestinal: BS+, soft, NT/ND  bilateral PCN draining urine   Extremities: No peripheral edema  Neurological: Awake, does not follow command. Agitated  : Loredo, Nephrostomy draining bilaterally   Skin: No rashes  Access: Not applicable  loredo - removved        LABS:                                   8.7    10.5  )-----------( 130      ( 21 Jan 2018 06:18 )             27.8                8.7    10.5  )-----------( 130      ( 21 Jan 2018 06:18 )             27.8     152    |  120    |  60     ----------------------------<  183       21 Jan 2018 06:18  4.5     |  25     |  3.45     148    |  119    |  73     ----------------------------<  132       20 Jan 2018 05:10  5.3     |  19     |  4.57     149    |  118    |  72     ----------------------------<  135       19 Jan 2018 06:32  5.1     |  21     |  4.39     Ca    8.4        21 Jan 2018 06:18  Ca    8.1        20 Jan 2018 05:10    Phos  4.0       21 Jan 2018 06:18  Phos  6.1       20 Jan 2018 05:10    Urine Studies:  Culture - Urine (01.18.18 @ 13:46)    Specimen Source: .Urine None    Culture Results:   >100,000 CFU/ml Presumptive Candida albicans    Urine Microscopic-Add On (NC) (01.15.18 @ 17:21)    Bacteria: Moderate    Epithelial Cells: Occasional    Red Blood Cell - Urine: 25-50 /HPF    White Blood Cell - Urine: >50      Culture - Blood (01.15.18 @ 17:21)    -  Gentamicin synergy:     -  Enterococcus species: Detec    Gram Stain:   Growth in aerobic bottle: Gram Positive Cocci in Pairs and Chains    -  Vancomycin: S 2    -  Ampicillin: S <=2    Specimen Source: .Blood None    Organism: Blood Culture PCR    Organism: Enterococcus faecalis    Culture Results:   Growth in aerobic bottle: Enterococcus faecalis  ***Blood Panel PCR results on this specimen are available  approximately 3 hours after the Gram stain result.***  Gram stain, PCR, and/or culture results may not always  correspond due to difference in methodologies.  ************************************************************  This PCR assay was performed using [employer].  The following targets are tested for: Enterococcus,  vancomycin resistant enterococci, Listeria monocytogenes,  coagulase negative staphylococci, S. aureus,  methicillin resistant S. aureus, Streptococcus agalactiae  (Group B), S. pneumoniae, S. pyogenes (Group A),  Acinetobacter baumannii, Enterobacter cloacae, E. coli,  Klebsiella oxytoca, K. pneumoniae, Proteussp.,  Serratia marcescens, Haemophilus influenzae,  Neisseria meningitidis, Pseudomonas aeruginosa, Candida  albicans, C. glabrata, C krusei, C parapsilosis,  C. tropicalis and the KPC resistance gene.    Organism Identification: Blood Culture PCR  Enterococcus faecalis    Method Type: PCR    Method Type: BANG              RADIOLOGY & ADDITIONAL STUDIES:        EXAM:  CT ABDOMEN AND PELVIS                            PROCEDURE DATE:  01/18/2018          INTERPRETATION:  Clinical information: Assess Loredo catheter position   after scrotal abscess drainage    COMPARISON: January 15, 2018    PROCEDURE:   CT of the abdomen and pelvis was performed.  IV contrast:  None  Oral contrast:  None  Coronal and sagittal reformatted images were obtained    FINDINGS:    LOWER CHEST: Visualized lung bases, heart and pericardium are   unremarkable.    LIVER: Within normal limits.  SPLEEN: Within normal limits.  PANCREAS: Within normal limits.  GALLBLADDER: Within normal limits.  BILE DUCTS: Normal caliber.  ADRENALS: Within normal limits.  KIDNEYS/URETERS: Severe bilateral hydroureteronephrosis with both ureters   dilated down to the ureterovesical junction. The degree of hydronephrosis   is unchanged.    RETROPERITONEUM: No lymphadenopathy.    VESSELS:  IVC filter in place. Normal caliber aorta.    BOWEL: No bowel obstruction, wall thickening or inflammatory change.   Appendix normal.  PERITONEUM: No ascites or pneumoperitoneum.    REPRODUCTIVE ORGANS: Mildly enlarged prostate.  BLADDER: Loredo catheter balloon within the urinary bladder lumen. Small   volume thick-walled and trabeculated bladder compatiblewith chronic   outlet obstruction. Contrast administered during intraoperative cystogram   earlier today is seen within collections in and adjacent to the penis and   at inferior base of the penis. The largest collection is contiguous with   the bulbous urethra and measures 5.8 x 2.6 by x 3.7 cm.    ABDOMINAL WALL: Small fat-containing umbilical hernia.  BONES: No acute bony abnormality. Right hip arthroplasty.    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    Severe bilateral hydroureteronephrosis.    Findings discussed with Dr. Young on January 18, 2018, immediately after   the examination was ended.
82y Male with PMHX of dementia , htn, cva and  now presenting with  PRABHU on CKD with UTI/obstructive uropathy     s/p b/l pcn placed    Agitated, aid at bedside.   nonverbal     REVIEW OF SYSTEMS:    UTO secondary to dementia    MEDICATIONS  (STANDING):  aspirin enteric coated 81 milliGRAM(s) Oral daily  dextrose 50% Injectable 12.5 Gram(s) IV Push once  dextrose 50% Injectable 25 Gram(s) IV Push once  dextrose 50% Injectable 25 Gram(s) IV Push once  finasteride 5 milliGRAM(s) Oral daily  heparin  Injectable 5000 Unit(s) SubCutaneous every 8 hours  insulin lispro (HumaLOG) corrective regimen sliding scale   SubCutaneous three times a day before meals  meropenem IVPB 500 milliGRAM(s) IV Intermittent every 12 hours  sodium chloride 0.45%. 1000 milliLiter(s) (100 mL/Hr) IV Continuous <Continuous>  sodium chloride 0.45%. 500 milliLiter(s) (500 mL/Hr) IV Continuous <Continuous>  tamsulosin 0.4 milliGRAM(s) Oral at bedtime    Vital Signs Last 24 Hrs  T(C): 36.8 (20 Jan 2018 08:38), Max: 38.2 (19 Jan 2018 16:13)  T(F): 98.3 (20 Jan 2018 08:38), Max: 100.7 (19 Jan 2018 16:13)  HR: 98 (20 Jan 2018 10:01) (72 - 130)  BP: 127/53 (20 Jan 2018 10:01) (82/47 - 173/77)  BP(mean): 69 (20 Jan 2018 10:01) (56 - 90)  RR: 21 (20 Jan 2018 10:01) (17 - 33)  SpO2: 100% (20 Jan 2018 10:01) (96% - 100%)      I&O's Summary    19 Jan 2018 07:01  -  20 Jan 2018 07:00  --------------------------------------------------------  IN: 2550 mL / OUT: 930 mL / NET: 1620 mL      PHYSICAL EXAM:    Constitutional: frail  HEENT:  EOMI,  MMM  Neck: No LAD, No JVD  Respiratory: scatt ronchi  Cardiovascular: S1 and S2, RRR  Gastrointestinal: BS+, soft, NT/ND  Extremities: No peripheral edema  Neurological: Awake, does not follow command. Agitated  : Chance, Nephrostomy draining bilaterally   Skin: No rashes  Access: Not applicable        LABS:        148    |  119    |  73     ----------------------------<  132       20 Jan 2018 05:10  5.3     |  19     |  4.57     149    |  118    |  72     ----------------------------<  135       19 Jan 2018 06:32  5.1     |  21     |  4.39     148    |  116    |  74     ----------------------------<  213       18 Jan 2018 06:16  4.5     |  21     |  4.01     Ca    8.1        20 Jan 2018 05:10  Ca    8.5        19 Jan 2018 06:32    Phos  6.1       20 Jan 2018 05:10  Phos  5.4       19 Jan 2018 06:32      150    |  120    |  72     ----------------------------<  214    4.1     |  23     |  3.41   16 Jan 2018 06:07    157    |  126    |  86     ----------------------------<  239    4.5     |  23     |  3.82   15 Yonny 2018 22:13    160    |  129    |  89     ----------------------------<  170    5.0     |  23     |  4.08     Urine Studies:  Culture - Urine (01.18.18 @ 13:46)    Specimen Source: .Urine None    Culture Results:   >100,000 CFU/ml Presumptive Candida albicans    Urine Microscopic-Add On (NC) (01.15.18 @ 17:21)    Bacteria: Moderate    Epithelial Cells: Occasional    Red Blood Cell - Urine: 25-50 /HPF    White Blood Cell - Urine: >50      Culture - Blood (01.15.18 @ 17:21)    -  Gentamicin synergy: S    -  Enterococcus species: Detec    Gram Stain:   Growth in aerobic bottle: Gram Positive Cocci in Pairs and Chains    -  Vancomycin: S 2    -  Ampicillin: S <=2    Specimen Source: .Blood None    Organism: Blood Culture PCR    Organism: Enterococcus faecalis    Culture Results:   Growth in aerobic bottle: Enterococcus faecalis  ***Blood Panel PCR results on this specimen are available  approximately 3 hours after the Gram stain result.***  Gram stain, PCR, and/or culture results may not always  correspond due to difference in methodologies.  ************************************************************  This PCR assay was performed using [employer].  The following targets are tested for: Enterococcus,  vancomycin resistant enterococci, Listeria monocytogenes,  coagulase negative staphylococci, S. aureus,  methicillin resistant S. aureus, Streptococcus agalactiae  (Group B), S. pneumoniae, S. pyogenes (Group A),  Acinetobacter baumannii, Enterobacter cloacae, E. coli,  Klebsiella oxytoca, K. pneumoniae, Proteussp.,  Serratia marcescens, Haemophilus influenzae,  Neisseria meningitidis, Pseudomonas aeruginosa, Candida  albicans, C. glabrata, C krusei, C parapsilosis,  C. tropicalis and the KPC resistance gene.    Organism Identification: Blood Culture PCR  Enterococcus faecalis    Method Type: PCR    Method Type: BANG              RADIOLOGY & ADDITIONAL STUDIES:        EXAM:  CT ABDOMEN AND PELVIS                            PROCEDURE DATE:  01/18/2018          INTERPRETATION:  Clinical information: Assess Chance catheter position   after scrotal abscess drainage    COMPARISON: January 15, 2018    PROCEDURE:   CT of the abdomen and pelvis was performed.  IV contrast:  None  Oral contrast:  None  Coronal and sagittal reformatted images were obtained    FINDINGS:    LOWER CHEST: Visualized lung bases, heart and pericardium are   unremarkable.    LIVER: Within normal limits.  SPLEEN: Within normal limits.  PANCREAS: Within normal limits.  GALLBLADDER: Within normal limits.  BILE DUCTS: Normal caliber.  ADRENALS: Within normal limits.  KIDNEYS/URETERS: Severe bilateral hydroureteronephrosis with both ureters   dilated down to the ureterovesical junction. The degree of hydronephrosis   is unchanged.    RETROPERITONEUM: No lymphadenopathy.    VESSELS:  IVC filter in place. Normal caliber aorta.    BOWEL: No bowel obstruction, wall thickening or inflammatory change.   Appendix normal.  PERITONEUM: No ascites or pneumoperitoneum.    REPRODUCTIVE ORGANS: Mildly enlarged prostate.  BLADDER: Chance catheter balloon within the urinary bladder lumen. Small   volume thick-walled and trabeculated bladder compatiblewith chronic   outlet obstruction. Contrast administered during intraoperative cystogram   earlier today is seen within collections in and adjacent to the penis and   at inferior base of the penis. The largest collection is contiguous with   the bulbous urethra and measures 5.8 x 2.6 by x 3.7 cm.    ABDOMINAL WALL: Small fat-containing umbilical hernia.  BONES: No acute bony abnormality. Right hip arthroplasty.    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    Severe bilateral hydroureteronephrosis.    Findings discussed with Dr. Young on January 18, 2018, immediately after   the examination was ended.
82yo M w/PMHx of HTN, Dementia, Major Depressive  Disorder, BPH with indwelling loredo  BIBA from [facility] for abnormal labs .   Per paperwork: baseline dementia, disoriented and non ambulatory at baseline . Labs  from paperwork: 2018 Cr - 2.8   2018 Cr - 3.24 and Today- Cr: 4.38;  admitted dec 2017  for acute renal failure from obstructive uropathy and dehydration  and UTi with blood cx positive for proteus .completed course of ceftin in rehab    #Review of system- unable to obtain, pt is minimally communicative     PHYSICAL EXAM:    Vital Signs Last 24 Hrs  T(C): 36.1 (2018 15:15), Max: 38.5 (2018 05:45)  T(F): 97 (2018 15:15), Max: 101.3 (2018 05:45)  HR: 70 (2018 15:25) (69 - 92)  BP: 123/64 (2018 15:25) (121/68 - 156/62)  BP(mean): --  RR: 20 (2018 15:25) (16 - 20)  SpO2: 98% (2018 15:25) (98% - 100%)    GENERAL: comfortable   HEAD:  Atraumatic, Normocephalic  EYES: EOMI, conjunctiva and sclera clear  HEENT: dry mucous membranes  NECK: Supple, No JVD  NERVOUS SYSTEM: Hard to comment,  opens eys  CHEST/LUNG: Clear to auscultation bilaterally; No rales, rhonchi, wheezing, or rubs  HEART:S1S2 normal, no murmer  ABDOMEN: Soft, Nontender, Nondistended; Bowel sounds present  GENITOURINARY- no palpable bladder  EXTREMITIES:  2+ Peripheral Pulses, No clubbing, cyanosis, or edema  MUSCULOSKELTAL- No muscle tenderness, No joint tenderness, no Joint swelling,   SKIN-no rash, no lesion  LYMPH Node- No palpable lymph node    LABS:                        8.7    11.2  )-----------( 169      ( 2018 11:37 )             27.9     01-17    150<H>  |  120<H>  |  72<H>  ----------------------------<  214<H>  4.1   |  23  |  3.41<H>    Ca    8.6      2018 11:37  Phos  3.6       Mg     2.4         TPro  6.9  /  Alb  1.8<L>  /  TBili  0.3  /  DBili  x   /  AST  6<L>  /  ALT  20  /  AlkPhos  160<H>  -    PT/INR - ( 15 Yonny 2018 17:21 )   PT: 13.5 sec;   INR: 1.24 ratio         PTT - ( 15 Yonny 2018 17:21 )  PTT:30.1 sec  Urinalysis Basic - ( 15 Yonny 2018 17:21 )    Color: Yellow / Appearance: very cloudy / S.025 / pH: x  Gluc: x / Ketone: Trace  / Bili: Negative / Urobili: Negative mg/dL   Blood: x / Protein: 100 mg/dL / Nitrite: Negative   Leuk Esterase: Moderate / RBC: 25-50 /HPF / WBC >50   Sq Epi: x / Non Sq Epi: Occasional / Bacteria: Moderate        CAPILLARY BLOOD GLUCOSE      POCT Blood Glucose.: 221 mg/dL (2018 11:21)  POCT Blood Glucose.: 252 mg/dL (2018 07:41)  POCT Blood Glucose.: 213 mg/dL (2018 05:42)  POCT Blood Glucose.: 211 mg/dL (2018 20:39)      CARDIAC MARKERS ( 2018 06:07 )  0.113 ng/mL / x     / 45 U/L / x     / x      CARDIAC MARKERS ( 15 Yonny 2018 22:13 )  0.142 ng/mL / x     / 48 U/L / x     / x      CARDIAC MARKERS ( 15 Yonny 2018 17:21 )  0.188 ng/mL / x     / x     / x     / x            Standing medicine  aspirin enteric coated 81 milliGRAM(s) Oral daily  dextrose 5%. 1000 milliLiter(s) IV Continuous <Continuous>  dextrose 5%. 1000 milliLiter(s) IV Continuous <Continuous>  dextrose 50% Injectable 12.5 Gram(s) IV Push once  dextrose 50% Injectable 25 Gram(s) IV Push once  dextrose 50% Injectable 25 Gram(s) IV Push once  dextrose Gel 1 Dose(s) Oral once PRN  finasteride 5 milliGRAM(s) Oral daily  glucagon  Injectable 1 milliGRAM(s) IntraMuscular once PRN  heparin  Injectable 5000 Unit(s) SubCutaneous every 8 hours  insulin lispro (HumaLOG) corrective regimen sliding scale   SubCutaneous three times a day before meals  piperacillin/tazobactam IVPB. 3.375 Gram(s) IV Intermittent every 12 hours  tamsulosin 0.4 milliGRAM(s) Oral at bedtime
84yo M w/PMHx of HTN, Dementia, Major Depressive  Disorder, BPH with indwelling loredo  BIBA from [facility] for abnormal labs . Per paperwork: baseline dementia, disoriented and non ambulatory at baseline . Labs reviewed from paperwork: January 8th 2018 Cr - 2.8 January 12th 2018 Cr - 3.24 and 1/15 Cr: 4.38; Unable to obtain history from patient due to dementia; admitted dec 2017 for acute renal failure from obstructive uropathy and dehydration and proteus sepsis/UTI with blood cx positive for proteus -completed course of ceftin in rehab, in ER, afebrile, normal wbc ct, UA grossly positive, elevated LA 4.5, loredo was replaced in ER with 1000 cc or urine output and 650 cc this am 1/16, had traumatic loredo placement with some bloody discharge around penis, CT abd/pelvis/chest showed posterior RLL infiltrates ? pna and some gas in scrotum which could be from loredo placement along with thickening in bladder wall, was given IV vanco/zosyn/rocephin.       confused  loredo with thick debris, urine  no fevers    MEDICATIONS  (STANDING):  aspirin enteric coated 81 milliGRAM(s) Oral daily  dextrose 5%. 1000 milliLiter(s) (100 mL/Hr) IV Continuous <Continuous>  dextrose 5%. 1000 milliLiter(s) (50 mL/Hr) IV Continuous <Continuous>  dextrose 50% Injectable 12.5 Gram(s) IV Push once  dextrose 50% Injectable 25 Gram(s) IV Push once  dextrose 50% Injectable 25 Gram(s) IV Push once  finasteride 5 milliGRAM(s) Oral daily  heparin  Injectable 5000 Unit(s) SubCutaneous every 8 hours  insulin lispro (HumaLOG) corrective regimen sliding scale   SubCutaneous three times a day before meals  meropenem IVPB 250 milliGRAM(s) IV Intermittent every 12 hours  meropenem IVPB 500 milliGRAM(s) IV Intermittent every 12 hours  tamsulosin 0.4 milliGRAM(s) Oral at bedtime      Vital Signs Last 24 Hrs  T(C): 36.1 (18 Jan 2018 15:15), Max: 38.5 (18 Jan 2018 05:45)  T(F): 97 (18 Jan 2018 15:15), Max: 101.3 (18 Jan 2018 05:45)  HR: 70 (18 Jan 2018 15:25) (69 - 92)  BP: 123/64 (18 Jan 2018 15:25) (121/68 - 156/62)  BP(mean): --  RR: 20 (18 Jan 2018 15:25) (16 - 20)  SpO2: 98% (18 Jan 2018 15:25) (98% - 100%)        PE:  Constitutional: frail looking  HEENT: NC/AT, EOMI, PERRLA  Neck: supple  Back: no tenderness  Respiratory: decreased breath sounds  Cardiovascular: S1S2 regular, no murmurs  Abdomen: soft, not tender, not distended, positive BS  Genitourinary: deferred, loredo in place   Rectal: deferred  Musculoskeletal: no muscle tenderness, no joint swelling or tenderness  Extremities: no pedal edema  Neurological: confused, combative, no focal deficits  Skin: no rashes    Labs:                                              8.7    11.2  )-----------( 169      ( 17 Jan 2018 11:37 )             27.9     01-18    148<H>  |  116<H>  |  74<H>  ----------------------------<  213<H>  4.5   |  21<L>  |  4.01<H>    Ca    8.4<L>      18 Jan 2018 06:16  Phos  3.1     01-18    TPro  x   /  Alb  1.8<L>  /  TBili  x   /  DBili  x   /  AST  x   /  ALT  x   /  AlkPhos  x   01-18           Cultures:     Culture - Blood (01.17.18 @ 11:37)    Specimen Source: .Blood Blood-Peripheral/ONLY AEROBIC BOTTLE REC'D    Culture Results:   No growth to date.    Culture - Urine (01.15.18 @ 17:21)    -  Gentamicin: S 2    -  Imipenem: S <=1    -  Levofloxacin: R >4    -  Meropenem: S <=1    -  Nitrofurantoin: S <=32    -  Piperacillin/Tazobactam: R >64    -  Tobramycin: R >8    -  Trimethoprim/Sulfamethoxazole: R >2/38    -  Amikacin: I 32    -  Ampicillin: R >16    -  Ampicillin/Sulbactam: R >16/8    -  Aztreonam: R >16    -  Cefazolin: R >16    -  Cefepime: R >16    -  Cefoxitin: S <=4    -  Ceftazidime: R >16    -  Ceftriaxone: R >32    -  Ciprofloxacin: R >2    -  Ertapenem: S <=0.5    Specimen Source: .Urine None    Culture Results:   >100,000 CFU/ml Escherichia coli ESBL    Organism Identification: Escherichia coli ESBL    Organism: Escherichia coli ESBL    Method Type: BANG    Culture - Blood (01.15.18 @ 17:21)    Gram Stain:   Growth in aerobic bottle: Gram Positive Cocci in Pairs and Chains    -  Enterococcus species: Detec    Specimen Source: .Blood None    Organism: Blood Culture PCR    Culture Results:   Growth in aerobic bottle: Enterococcus faecalis  ***Blood Panel PCR results on this specimen are available  approximately 3 hours after the Gram stain result.***  Gram stain, PCR, and/or culture results may not always  correspond due to difference in methodologies.  ************************************************************  This PCR assay was performed using [employer].  The following targets are tested for: Enterococcus,  vancomycin resistant enterococci, Listeria monocytogenes,  coagulase negative staphylococci, S. aureus,  methicillin resistant S. aureus, Streptococcus agalactiae  (Group B), S. pneumoniae, S. pyogenes (Group A),  Acinetobacter baumannii, Enterobacter cloacae, E. coli,  Klebsiella oxytoca, K. pneumoniae, Proteussp.,  Serratia marcescens, Haemophilus influenzae,  Neisseria meningitidis, Pseudomonas aeruginosa, Candida  albicans, C. glabrata, C krusei, C parapsilosis,  C. tropicalis and the KPC resistance gene.    Organism Identification: Blood Culture PCR    Method Type: PCR    TTE no vegetations            Radiology:  < from: CT Chest No Cont (01.15.18 @ 23:23) >    EXAM:  CT ABDOMEN AND PELVIS                          EXAM:  CT CHEST                            PROCEDURE DATE:  01/15/2018          INTERPRETATION:  Chest CT without contrast dated 1/15/2018.    COMPARISON: None available.    CLINICAL INFORMATION: Sepsis.    TECHNIQUE: Contiguous axial 2.5 mm slice thickness images of the chest   were obtained without intravenous contrast administration.    FINDINGS:    The airway shows normal caliber and contour with patent lumen.  Mild dependent atelectaticchanges in the dependent portions of the lower   lobes. Some superimposed infiltrates in the posterior right lower lobe is   suspected for which clinical correlation is recommended.    There is no pleural effusion or pneumothorax.    There are no mediastinal lymphadenopathy or masses.    The mediastinum great vessels are normal. Coronary arteries are   calcified.     The heart is normal. There is no pericardial effusion.    The bones , chronic compression deformity T12. Some degenerative changes   in the thoracolumbar spine.    IMPRESSION: Some infiltrates, and/or atelectatic changes in the right   lower lobe?,suggestive of mild pneumonia for which clinical correlation   is recommended.    Non contrast CT of the abdomen and pelvis dated 1/15/2018.     COMPARISON: 12/28/17.    CLINICAL HISTORY: Sepsis.    Technique: contiguous axial images were obtained with 2.5 mm slice   thickness without intravenous or oral contrast administration, which   limits the visualization of the intra-abdominalstructures.   Coronal and sagittal reformats were also submitted for interpretation.      FINDINGS:     There is no free intra-abdominal air or ascites.     The unopacified liver, spleen, pancreas, adrenal glands and gallbladder   are normal.         Advanced directives addressed: full resuscitation
84yo M w/PMHx of HTN, Dementia, Major Depressive  Disorder, BPH with indwelling loredo  BIBA from [facility] for abnormal labs . Per paperwork: baseline dementia, disoriented and non ambulatory at baseline . Labs reviewed from paperwork: January 8th 2018 Cr - 2.8 January 12th 2018 Cr - 3.24 and 1/15 Cr: 4.38; Unable to obtain history from patient due to dementia; admitted dec 2017 for acute renal failure from obstructive uropathy and dehydration and proteus sepsis/UTI with blood cx positive for proteus -completed course of ceftin in rehab, in ER, afebrile, normal wbc ct, UA grossly positive, elevated LA 4.5, loredo was replaced in ER with 1000 cc or urine output and 650 cc this am 1/16, had traumatic loredo placement with some bloody discharge around penis, CT abd/pelvis/chest showed posterior RLL infiltrates ? pna and some gas in scrotum which could be from loredo placement along with thickening in bladder wall, was given IV vanco/zosyn/rocephin.     was taken to OR per urology 1/18  found to have scrotal/penile abscess, purulent fluid expressed  s/p b/l pcn placement 1/19  lethargic      MEDICATIONS  (STANDING):  aspirin enteric coated 81 milliGRAM(s) Oral daily  finasteride 5 milliGRAM(s) Oral daily  fluconAZOLE IVPB      fluconAZOLE IVPB 100 milliGRAM(s) IV Intermittent every 24 hours  heparin  Injectable 5000 Unit(s) SubCutaneous every 8 hours  meropenem IVPB 500 milliGRAM(s) IV Intermittent every 12 hours  tamsulosin 0.4 milliGRAM(s) Oral at bedtime      Vital Signs Last 24 Hrs  T(C): 37.1 (24 Jan 2018 11:57), Max: 37.8 (23 Jan 2018 17:00)  T(F): 98.8 (24 Jan 2018 11:57), Max: 100.1 (23 Jan 2018 17:00)  HR: 78 (24 Jan 2018 11:00) (71 - 91)  BP: 96/61 (24 Jan 2018 11:00) (93/66 - 119/52)  BP(mean): 69 (24 Jan 2018 11:00) (63 - 77)  RR: 21 (24 Jan 2018 11:00) (14 - 25)  SpO2: 99% (24 Jan 2018 11:00) (97% - 100%)          PE:  Constitutional: frail looking  HEENT: NC/AT, EOMI, PERRLA  Neck: supple  Back: no tenderness  Respiratory: decreased breath sounds  Cardiovascular: S1S2 regular, no murmurs  Abdomen: soft, not tender, not distended, positive BS  Genitourinary: deferred, b/l PCN tubes in place  Rectal: deferred  Musculoskeletal: no muscle tenderness, no joint swelling or tenderness  Extremities: no pedal edema  Neurological: confused, no focal deficits  Skin: no rashes    Labs:                                   8.1    10.6  )-----------( 133      ( 23 Jan 2018 04:44 )             25.7     01-24    144  |  111<H>  |  37<H>  ----------------------------<  110<H>  3.7   |  24  |  1.87<H>    Ca    8.0<L>      24 Jan 2018 05:59  Phos  3.0     01-23  Mg     1.6     01-23            Culture - Urine (01.19.18 @ 15:50)    -  Amikacin: I 32    -  Ampicillin: R >16    -  Ampicillin/Sulbactam: R >16/8    -  Aztreonam: R >16    -  Cefazolin: R >16    -  Cefepime: R >16    -  Cefoxitin: S <=4    -  Ceftazidime: R >16    -  Ceftriaxone: R >32    -  Ciprofloxacin: R >2    -  Ertapenem: S <=0.5    -  Imipenem: S <=1    -  Gentamicin: S 2    -  Levofloxacin: R >4    -  Meropenem: S <=1    -  Nitrofurantoin: S <=32    -  Piperacillin/Tazobactam: R >64    -  Tobramycin: R >8    -  Trimethoprim/Sulfamethoxazole: R >2/38    Specimen Source: .Urine Nephrostomy - Left - CUP    Culture Results:   Few Escherichia coli ESBL  Numerous Presumptive Meka albicans  Few Enterococcus faecalis    Organism Identification: Escherichia coli ESBL    Organism: Escherichia coli ESBL    Method Type: BANG      Culture - Blood (01.17.18 @ 11:37)    Specimen Source: .Blood Blood-Peripheral/ONLY AEROBIC BOTTLE REC'D    Culture Results:   No growth to date.    Culture - Urine (01.15.18 @ 17:21)    -  Gentamicin: S 2    -  Imipenem: S <=1    -  Levofloxacin: R >4    -  Meropenem: S <=1    -  Nitrofurantoin: S <=32  Culture - Urine (01.19.18 @ 15:30)    -  Ampicillin/Sulbactam: R >16/8    -  Ampicillin: R >16    -  Amikacin: I 32    -  Nitrofurantoin: S <=32    -  Meropenem: S <=1    -  Levofloxacin: R >4    -  Gentamicin: S 2    -  Imipenem: S <=1    -  Ertapenem: S <=0.5    -  Ciprofloxacin: R >2    -  Ceftriaxone: R >32    -  Ceftazidime: R >16    -  Cefoxitin: S <=4    -  Cefepime: R >16    -  Cefazolin: R >16    -  Aztreonam: R >16    -  Trimethoprim/Sulfamethoxazole: R >2/38    -  Tobramycin: R >8    -  Piperacillin/Tazobactam: R >64    Specimen Source: .Urine Nephrostomy - Right - CUP    Culture Results:   Moderate Escherichia coli ESBL  Numerous Presumptive Candida albicans    Organism Identification: Escherichia coli ESBL    Organism: Escherichia coli ESBL    Method Type: BANG      -  Piperacillin/Tazobactam: R >64    -  Tobramycin: R >8    -  Trimethoprim/Sulfamethoxazole: R >2/38    -  Amikacin: I 32    -  Ampicillin: R >16    -  Ampicillin/Sulbactam: R >16/8    -  Aztreonam: R >16    -  Cefazolin: R >16    -  Cefepime: R >16    -  Cefoxitin: S <=4    -  Ceftazidime: R >16    -  Ceftriaxone: R >32    -  Ciprofloxacin: R >2    -  Ertapenem: S <=0.5    Specimen Source: .Urine None    Culture Results:   >100,000 CFU/ml Escherichia coli ESBL    Organism Identification: Escherichia coli ESBL    Organism: Escherichia coli ESBL    Method Type: BANG    Culture - Abscess with Gram Stain (01.18.18 @ 13:46)    -  Amikacin: R >32    -  Amikacin: S <=8    -  Ampicillin: R >16    -  Ampicillin: R >16    -  Ampicillin: S <=2    -  Ampicillin/Sulbactam: R >16/8    -  Ampicillin/Sulbactam: S 8/4    -  Aztreonam: R >16    -  Aztreonam: S <=4    -  Cefazolin: R >16    -  Cefazolin: R >16    -  Cefepime: R >16    -  Cefepime: S <=2    -  Cefoxitin: S 8    -  Cefoxitin: I 16    -  Ceftazidime: R >16    -  Ceftazidime: S 4    -  Ceftriaxone: R >32    -  Ceftriaxone: S <=1    -  Ciprofloxacin: R >2    -  Ciprofloxacin: R >2    -  Ciprofloxacin: S <=1    -  Ertapenem: S <=0.5    -  Ertapenem: S <=0.5    -  Erythromycin: R >4    -  Imipenem: S <=1    -  Imipenem: I 2    -  Gentamicin: S 2    -  Gentamicin: S <=1    -  Levofloxacin: R >4    -  Levofloxacin: R >4    -  Meropenem: S <=1    -  Meropenem: S <=1    -  Piperacillin/Tazobactam: R >64    -  Piperacillin/Tazobactam: S <=8    -  Tetra/Doxy: R >8    -  Tobramycin: R >8    -  Tobramycin: S <=2    -  Trimethoprim/Sulfamethoxazole: R >2/38    -  Trimethoprim/Sulfamethoxazole: R >2/38    -  Vancomycin: S 2    Specimen Source: .Abscess SCROTAL PUSS    Culture Results:   Rare Escherichia coli ESBL  Rare Morganella morganii  Few Enterococcus faecalis  Few Candida albicans    Organism Identification: Escherichia coli ESBL  Morganella morganii  Enterococcus faecalis    Organism: Escherichia coli ESBL    Organism: Morganella morganii    Organism: Enterococcus faecalis    Method Type: BANG    Method Type: BANG    Method Type: BANG        Culture - Blood (01.17.18 @ 11:37)    Specimen Source: .Blood Blood-Peripheral/ONLY AEROBIC BOTTLE REC'D    Culture Results:   No growth to date.    Culture - Fungal, Other (01.18.18 @ 13:46)    Specimen Source: .Other Scrotal puss    Culture Results:   Testing in progress    Culture - Blood (01.15.18 @ 17:21)    -  Ampicillin: S <=2    Gram Stain:   Growth in aerobic bottle: Gram Positive Cocci in Pairs and Chains    -  Vancomycin: S 2    -  Gentamicin synergy: S    -  Enterococcus species: Detec    Specimen Source: .Blood None    Organism: Blood Culture PCR    Organism: Enterococcus faecalis    Culture Results:   Growth in aerobic bottle: Enterococcus faecalis  ***Blood Panel PCR results on this specimen are available  approximately 3 hours after the Gram stain result.***  Gram stain, PCR, and/or culture results may not always  correspond due to difference in methodologies.  ************************************************************  This PCR assay was performed using [employer].  The following targets are tested for: Enterococcus,  vancomycin resistant enterococci, Listeria monocytogenes,  coagulase negative staphylococci, S. aureus,  methicillin resistant S. aureus, Streptococcus agalactiae  (Group B), S. pneumoniae, S. pyogenes (Group A),  Acinetobacter baumannii, Enterobacter cloacae, E. coli,  Klebsiella oxytoca, K. pneumoniae, Proteussp.,  Serratia marcescens, Haemophilus influenzae,  Neisseria meningitidis, Pseudomonas aeruginosa, Candida  albicans, C. glabrata, C krusei, C parapsilosis,  C. tropicalis and the KPC resistance gene.    Organism Identification: Blood Culture PCR  Enterococcus faecalis    Method Type: PCR    Method Type: BANG        TTE no vegetations            Radiology:  < from: CT Chest No Cont (01.15.18 @ 23:23) >    EXAM:  CT ABDOMEN AND PELVIS                          EXAM:  CT CHEST                            PROCEDURE DATE:  01/15/2018          INTERPRETATION:  Chest CT without contrast dated 1/15/2018.    COMPARISON: None available.    CLINICAL INFORMATION: Sepsis.    TECHNIQUE: Contiguous axial 2.5 mm slice thickness images of the chest   were obtained without intravenous contrast administration.    FINDINGS:    The airway shows normal caliber and contour with patent lumen.  Mild dependent atelectaticchanges in the dependent portions of the lower   lobes. Some superimposed infiltrates in the posterior right lower lobe is   suspected for which clinical correlation is recommended.    There is no pleural effusion or pneumothorax.    There are no mediastinal lymphadenopathy or masses.    The mediastinum great vessels are normal. Coronary arteries are   calcified.     The heart is normal. There is no pericardial effusion.    The bones , chronic compression deformity T12. Some degenerative changes   in the thoracolumbar spine.    IMPRESSION: Some infiltrates, and/or atelectatic changes in the right   lower lobe?,suggestive of mild pneumonia for which clinical correlation   is recommended.    Non contrast CT of the abdomen and pelvis dated 1/15/2018.     COMPARISON: 12/28/17.    CLINICAL HISTORY: Sepsis.    Technique: contiguous axial images were obtained with 2.5 mm slice   thickness without intravenous or oral contrast administration, which   limits the visualization of the intra-abdominalstructures.   Coronal and sagittal reformats were also submitted for interpretation.      FINDINGS:     There is no free intra-abdominal air or ascites.     The unopacified liver, spleen, pancreas, adrenal glands and gallbladder   are normal.         Advanced directives addressed:DNR
84yo M w/PMHx of HTN, Dementia, Major Depressive  Disorder, BPH with indwelling loredo  BIBA from [facility] for abnormal labs . Per paperwork: baseline dementia, disoriented and non ambulatory at baseline . Labs reviewed from paperwork: January 8th 2018 Cr - 2.8 January 12th 2018 Cr - 3.24 and 1/15 Cr: 4.38; Unable to obtain history from patient due to dementia; admitted dec 2017 for acute renal failure from obstructive uropathy and dehydration and proteus sepsis/UTI with blood cx positive for proteus -completed course of ceftin in rehab, in ER, afebrile, normal wbc ct, UA grossly positive, elevated LA 4.5, loredo was replaced in ER with 1000 cc or urine output and 650 cc this am 1/16, had traumatic loredo placement with some bloody discharge around penis, CT abd/pelvis/chest showed posterior RLL infiltrates ? pna and some gas in scrotum which could be from loredo placement along with thickening in bladder wall, was given IV vanco/zosyn/rocephin.     was taken to OR per urology 1/18  found to have scrotal/penile abscess, purulent fluid expressed  s/p b/l pcn placement 1/19  low grade temps   lethargic    MEDICATIONS  (STANDING):  aspirin enteric coated 81 milliGRAM(s) Oral daily  finasteride 5 milliGRAM(s) Oral daily  fluconAZOLE IVPB      heparin  Injectable 5000 Unit(s) SubCutaneous every 8 hours  meropenem IVPB 500 milliGRAM(s) IV Intermittent every 12 hours  tamsulosin 0.4 milliGRAM(s) Oral at bedtime      Vital Signs Last 24 Hrs  T(C): 36.1 (23 Jan 2018 08:02), Max: 36.8 (22 Jan 2018 13:56)  T(F): 97 (23 Jan 2018 08:02), Max: 98.2 (22 Jan 2018 13:56)  HR: 59 (23 Jan 2018 11:00) (59 - 83)  BP: 100/58 (23 Jan 2018 11:00) (92/54 - 138/63)  BP(mean): 66 (23 Jan 2018 11:00) (62 - 86)  RR: 18 (23 Jan 2018 11:00) (16 - 23)  SpO2: 98% (23 Jan 2018 11:00) (98% - 100%)      PE:  Constitutional: frail looking  HEENT: NC/AT, EOMI, PERRLA  Neck: supple  Back: no tenderness  Respiratory: decreased breath sounds  Cardiovascular: S1S2 regular, no murmurs  Abdomen: soft, not tender, not distended, positive BS  Genitourinary: deferred, b/l PCN tubes in place  Rectal: deferred  Musculoskeletal: no muscle tenderness, no joint swelling or tenderness  Extremities: no pedal edema  Neurological: confused, no focal deficits  Skin: no rashes    Labs:                                   8.1    10.6  )-----------( 133      ( 23 Jan 2018 04:44 )             25.7     01-23    143  |  109<H>  |  40<H>  ----------------------------<  164<H>  3.9   |  26  |  2.08<H>    Ca    8.1<L>      23 Jan 2018 04:44  Phos  3.0     01-23  Mg     1.6     01-23    TPro  x   /  Alb  1.8<L>  /  TBili  x   /  DBili  x   /  AST  x   /  ALT  x   /  AlkPhos  x   01-22           Culture - Urine (01.19.18 @ 15:50)    -  Amikacin: I 32    -  Ampicillin: R >16    -  Ampicillin/Sulbactam: R >16/8    -  Aztreonam: R >16    -  Cefazolin: R >16    -  Cefepime: R >16    -  Cefoxitin: S <=4    -  Ceftazidime: R >16    -  Ceftriaxone: R >32    -  Ciprofloxacin: R >2    -  Ertapenem: S <=0.5    -  Imipenem: S <=1    -  Gentamicin: S 2    -  Levofloxacin: R >4    -  Meropenem: S <=1    -  Nitrofurantoin: S <=32    -  Piperacillin/Tazobactam: R >64    -  Tobramycin: R >8    -  Trimethoprim/Sulfamethoxazole: R >2/38    Specimen Source: .Urine Nephrostomy - Left - CUP    Culture Results:   Few Escherichia coli ESBL  Numerous Presumptive Meka albicans  Few Enterococcus faecalis    Organism Identification: Escherichia coli ESBL    Organism: Escherichia coli ESBL    Method Type: BANG      Culture - Blood (01.17.18 @ 11:37)    Specimen Source: .Blood Blood-Peripheral/ONLY AEROBIC BOTTLE REC'D    Culture Results:   No growth to date.    Culture - Urine (01.15.18 @ 17:21)    -  Gentamicin: S 2    -  Imipenem: S <=1    -  Levofloxacin: R >4    -  Meropenem: S <=1    -  Nitrofurantoin: S <=32  Culture - Urine (01.19.18 @ 15:30)    -  Ampicillin/Sulbactam: R >16/8    -  Ampicillin: R >16    -  Amikacin: I 32    -  Nitrofurantoin: S <=32    -  Meropenem: S <=1    -  Levofloxacin: R >4    -  Gentamicin: S 2    -  Imipenem: S <=1    -  Ertapenem: S <=0.5    -  Ciprofloxacin: R >2    -  Ceftriaxone: R >32    -  Ceftazidime: R >16    -  Cefoxitin: S <=4    -  Cefepime: R >16    -  Cefazolin: R >16    -  Aztreonam: R >16    -  Trimethoprim/Sulfamethoxazole: R >2/38    -  Tobramycin: R >8    -  Piperacillin/Tazobactam: R >64    Specimen Source: .Urine Nephrostomy - Right - CUP    Culture Results:   Moderate Escherichia coli ESBL  Numerous Presumptive Candida albicans    Organism Identification: Escherichia coli ESBL    Organism: Escherichia coli ESBL    Method Type: BANG      -  Piperacillin/Tazobactam: R >64    -  Tobramycin: R >8    -  Trimethoprim/Sulfamethoxazole: R >2/38    -  Amikacin: I 32    -  Ampicillin: R >16    -  Ampicillin/Sulbactam: R >16/8    -  Aztreonam: R >16    -  Cefazolin: R >16    -  Cefepime: R >16    -  Cefoxitin: S <=4    -  Ceftazidime: R >16    -  Ceftriaxone: R >32    -  Ciprofloxacin: R >2    -  Ertapenem: S <=0.5    Specimen Source: .Urine None    Culture Results:   >100,000 CFU/ml Escherichia coli ESBL    Organism Identification: Escherichia coli ESBL    Organism: Escherichia coli ESBL    Method Type: BANG    Culture - Abscess with Gram Stain (01.18.18 @ 13:46)    -  Amikacin: R >32    -  Amikacin: S <=8    -  Ampicillin: R >16    -  Ampicillin: R >16    -  Ampicillin: S <=2    -  Ampicillin/Sulbactam: R >16/8    -  Ampicillin/Sulbactam: S 8/4    -  Aztreonam: R >16    -  Aztreonam: S <=4    -  Cefazolin: R >16    -  Cefazolin: R >16    -  Cefepime: R >16    -  Cefepime: S <=2    -  Cefoxitin: S 8    -  Cefoxitin: I 16    -  Ceftazidime: R >16    -  Ceftazidime: S 4    -  Ceftriaxone: R >32    -  Ceftriaxone: S <=1    -  Ciprofloxacin: R >2    -  Ciprofloxacin: R >2    -  Ciprofloxacin: S <=1    -  Ertapenem: S <=0.5    -  Ertapenem: S <=0.5    -  Erythromycin: R >4    -  Imipenem: S <=1    -  Imipenem: I 2    -  Gentamicin: S 2    -  Gentamicin: S <=1    -  Levofloxacin: R >4    -  Levofloxacin: R >4    -  Meropenem: S <=1    -  Meropenem: S <=1    -  Piperacillin/Tazobactam: R >64    -  Piperacillin/Tazobactam: S <=8    -  Tetra/Doxy: R >8    -  Tobramycin: R >8    -  Tobramycin: S <=2    -  Trimethoprim/Sulfamethoxazole: R >2/38    -  Trimethoprim/Sulfamethoxazole: R >2/38    -  Vancomycin: S 2    Specimen Source: .Abscess SCROTAL PUSS    Culture Results:   Rare Escherichia coli ESBL  Rare Morganella morganii  Few Enterococcus faecalis  Few Candida albicans    Organism Identification: Escherichia coli ESBL  Morganella morganii  Enterococcus faecalis    Organism: Escherichia coli ESBL    Organism: Morganella morganii    Organism: Enterococcus faecalis    Method Type: BANG    Method Type: BANG    Method Type: BANG        Culture - Blood (01.17.18 @ 11:37)    Specimen Source: .Blood Blood-Peripheral/ONLY AEROBIC BOTTLE REC'D    Culture Results:   No growth to date.    Culture - Fungal, Other (01.18.18 @ 13:46)    Specimen Source: .Other Scrotal puss    Culture Results:   Testing in progress    Culture - Blood (01.15.18 @ 17:21)    -  Ampicillin: S <=2    Gram Stain:   Growth in aerobic bottle: Gram Positive Cocci in Pairs and Chains    -  Vancomycin: S 2    -  Gentamicin synergy: S    -  Enterococcus species: Detec    Specimen Source: .Blood None    Organism: Blood Culture PCR    Organism: Enterococcus faecalis    Culture Results:   Growth in aerobic bottle: Enterococcus faecalis  ***Blood Panel PCR results on this specimen are available  approximately 3 hours after the Gram stain result.***  Gram stain, PCR, and/or culture results may not always  correspond due to difference in methodologies.  ************************************************************  This PCR assay was performed using [employer].  The following targets are tested for: Enterococcus,  vancomycin resistant enterococci, Listeria monocytogenes,  coagulase negative staphylococci, S. aureus,  methicillin resistant S. aureus, Streptococcus agalactiae  (Group B), S. pneumoniae, S. pyogenes (Group A),  Acinetobacter baumannii, Enterobacter cloacae, E. coli,  Klebsiella oxytoca, K. pneumoniae, Proteussp.,  Serratia marcescens, Haemophilus influenzae,  Neisseria meningitidis, Pseudomonas aeruginosa, Candida  albicans, C. glabrata, C krusei, C parapsilosis,  C. tropicalis and the KPC resistance gene.    Organism Identification: Blood Culture PCR  Enterococcus faecalis    Method Type: PCR    Method Type: BANG        TTE no vegetations            Radiology:  < from: CT Chest No Cont (01.15.18 @ 23:23) >    EXAM:  CT ABDOMEN AND PELVIS                          EXAM:  CT CHEST                            PROCEDURE DATE:  01/15/2018          INTERPRETATION:  Chest CT without contrast dated 1/15/2018.    COMPARISON: None available.    CLINICAL INFORMATION: Sepsis.    TECHNIQUE: Contiguous axial 2.5 mm slice thickness images of the chest   were obtained without intravenous contrast administration.    FINDINGS:    The airway shows normal caliber and contour with patent lumen.  Mild dependent atelectaticchanges in the dependent portions of the lower   lobes. Some superimposed infiltrates in the posterior right lower lobe is   suspected for which clinical correlation is recommended.    There is no pleural effusion or pneumothorax.    There are no mediastinal lymphadenopathy or masses.    The mediastinum great vessels are normal. Coronary arteries are   calcified.     The heart is normal. There is no pericardial effusion.    The bones , chronic compression deformity T12. Some degenerative changes   in the thoracolumbar spine.    IMPRESSION: Some infiltrates, and/or atelectatic changes in the right   lower lobe?,suggestive of mild pneumonia for which clinical correlation   is recommended.    Non contrast CT of the abdomen and pelvis dated 1/15/2018.     COMPARISON: 12/28/17.    CLINICAL HISTORY: Sepsis.    Technique: contiguous axial images were obtained with 2.5 mm slice   thickness without intravenous or oral contrast administration, which   limits the visualization of the intra-abdominalstructures.   Coronal and sagittal reformats were also submitted for interpretation.      FINDINGS:     There is no free intra-abdominal air or ascites.     The unopacified liver, spleen, pancreas, adrenal glands and gallbladder   are normal.         Advanced directives addressed:DNR
84yo M w/PMHx of HTN, Dementia, Major Depressive  Disorder, BPH with indwelling loredo  BIBA from [facility] for abnormal labs . Per paperwork: baseline dementia, disoriented and non ambulatory at baseline . Labs reviewed from paperwork: January 8th 2018 Cr - 2.8 January 12th 2018 Cr - 3.24 and 1/15 Cr: 4.38; Unable to obtain history from patient due to dementia; admitted dec 2017 for acute renal failure from obstructive uropathy and dehydration and proteus sepsis/UTI with blood cx positive for proteus -completed course of ceftin in rehab, in ER, afebrile, normal wbc ct, UA grossly positive, elevated LA 4.5, loredo was replaced in ER with 1000 cc or urine output and 650 cc this am 1/16, had traumatic loredo placement with some bloody discharge around penis, CT abd/pelvis/chest showed posterior RLL infiltrates ? pna and some gas in scrotum which could be from loredo placement along with thickening in bladder wall, was given IV vanco/zosyn/rocephin.     was taken to OR per urology 1/18  found to have scrotal/penile abscess, purulent fluid expressed  s/p b/l pcn placement 1/19  noted with low grade temps    MEDICATIONS  (STANDING):  aspirin enteric coated 81 milliGRAM(s) Oral daily  dextrose 5%. 1000 milliLiter(s) (100 mL/Hr) IV Continuous <Continuous>  dextrose 50% Injectable 12.5 Gram(s) IV Push once  dextrose 50% Injectable 25 Gram(s) IV Push once  dextrose 50% Injectable 25 Gram(s) IV Push once  finasteride 5 milliGRAM(s) Oral daily  fluconAZOLE   Tablet 100 milliGRAM(s) Oral daily  heparin  Injectable 5000 Unit(s) SubCutaneous every 8 hours  insulin lispro (HumaLOG) corrective regimen sliding scale   SubCutaneous three times a day before meals  meropenem IVPB 500 milliGRAM(s) IV Intermittent every 12 hours  tamsulosin 0.4 milliGRAM(s) Oral at bedtime      Vital Signs Last 24 Hrs  T(C): 37.2 (22 Jan 2018 09:04), Max: 37.9 (21 Jan 2018 17:45)  T(F): 98.9 (22 Jan 2018 09:04), Max: 100.3 (21 Jan 2018 17:45)  HR: 73 (22 Jan 2018 11:00) (67 - 92)  BP: 123/58 (22 Jan 2018 11:00) (109/58 - 144/67)  BP(mean): 76 (22 Jan 2018 11:00) (68 - 98)  RR: 19 (22 Jan 2018 11:00) (17 - 23)  SpO2: 98% (22 Jan 2018 11:00) (97% - 100%)        PE:  Constitutional: frail looking  HEENT: NC/AT, EOMI, PERRLA  Neck: supple  Back: no tenderness  Respiratory: decreased breath sounds  Cardiovascular: S1S2 regular, no murmurs  Abdomen: soft, not tender, not distended, positive BS  Genitourinary: deferred, b/l PCN tubes in place  Rectal: deferred  Musculoskeletal: no muscle tenderness, no joint swelling or tenderness  Extremities: no pedal edema  Neurological: confused, no focal deficits  Skin: no rashes    Labs:                                                     9.1    12.0  )-----------( 136      ( 22 Jan 2018 06:25 )             28.6     01-22    148<H>  |  114<H>  |  49<H>  ----------------------------<  175<H>  3.8   |  24  |  2.58<H>    Ca    8.5      22 Jan 2018 06:25  Phos  3.3     01-22    TPro  x   /  Alb  1.8<L>  /  TBili  x   /  DBili  x   /  AST  x   /  ALT  x   /  AlkPhos  x   01-22        Cultures:       Culture - Urine (01.19.18 @ 15:50)    -  Amikacin: I 32    -  Ampicillin: R >16    -  Ampicillin/Sulbactam: R >16/8    -  Aztreonam: R >16    -  Cefazolin: R >16    -  Cefepime: R >16    -  Cefoxitin: S <=4    -  Ceftazidime: R >16    -  Ceftriaxone: R >32    -  Ciprofloxacin: R >2    -  Ertapenem: S <=0.5    -  Imipenem: S <=1    -  Gentamicin: S 2    -  Levofloxacin: R >4    -  Meropenem: S <=1    -  Nitrofurantoin: S <=32    -  Piperacillin/Tazobactam: R >64    -  Tobramycin: R >8    -  Trimethoprim/Sulfamethoxazole: R >2/38    Specimen Source: .Urine Nephrostomy - Left - CUP    Culture Results:   Few Escherichia coli ESBL  Numerous Presumptive Meka albicans  Few Enterococcus faecalis    Organism Identification: Escherichia coli ESBL    Organism: Escherichia coli ESBL    Method Type: BANG      Culture - Blood (01.17.18 @ 11:37)    Specimen Source: .Blood Blood-Peripheral/ONLY AEROBIC BOTTLE REC'D    Culture Results:   No growth to date.    Culture - Urine (01.15.18 @ 17:21)    -  Gentamicin: S 2    -  Imipenem: S <=1    -  Levofloxacin: R >4    -  Meropenem: S <=1    -  Nitrofurantoin: S <=32  Culture - Urine (01.19.18 @ 15:30)    -  Ampicillin/Sulbactam: R >16/8    -  Ampicillin: R >16    -  Amikacin: I 32    -  Nitrofurantoin: S <=32    -  Meropenem: S <=1    -  Levofloxacin: R >4    -  Gentamicin: S 2    -  Imipenem: S <=1    -  Ertapenem: S <=0.5    -  Ciprofloxacin: R >2    -  Ceftriaxone: R >32    -  Ceftazidime: R >16    -  Cefoxitin: S <=4    -  Cefepime: R >16    -  Cefazolin: R >16    -  Aztreonam: R >16    -  Trimethoprim/Sulfamethoxazole: R >2/38    -  Tobramycin: R >8    -  Piperacillin/Tazobactam: R >64    Specimen Source: .Urine Nephrostomy - Right - CUP    Culture Results:   Moderate Escherichia coli ESBL  Numerous Presumptive Candida albicans    Organism Identification: Escherichia coli ESBL    Organism: Escherichia coli ESBL    Method Type: BANG      -  Piperacillin/Tazobactam: R >64    -  Tobramycin: R >8    -  Trimethoprim/Sulfamethoxazole: R >2/38    -  Amikacin: I 32    -  Ampicillin: R >16    -  Ampicillin/Sulbactam: R >16/8    -  Aztreonam: R >16    -  Cefazolin: R >16    -  Cefepime: R >16    -  Cefoxitin: S <=4    -  Ceftazidime: R >16    -  Ceftriaxone: R >32    -  Ciprofloxacin: R >2    -  Ertapenem: S <=0.5    Specimen Source: .Urine None    Culture Results:   >100,000 CFU/ml Escherichia coli ESBL    Organism Identification: Escherichia coli ESBL    Organism: Escherichia coli ESBL    Method Type: BANG    Culture - Abscess with Gram Stain (01.18.18 @ 13:46)    -  Amikacin: R >32    -  Amikacin: S <=8    -  Ampicillin: R >16    -  Ampicillin: R >16    -  Ampicillin: S <=2    -  Ampicillin/Sulbactam: R >16/8    -  Ampicillin/Sulbactam: S 8/4    -  Aztreonam: R >16    -  Aztreonam: S <=4    -  Cefazolin: R >16    -  Cefazolin: R >16    -  Cefepime: R >16    -  Cefepime: S <=2    -  Cefoxitin: S 8    -  Cefoxitin: I 16    -  Ceftazidime: R >16    -  Ceftazidime: S 4    -  Ceftriaxone: R >32    -  Ceftriaxone: S <=1    -  Ciprofloxacin: R >2    -  Ciprofloxacin: R >2    -  Ciprofloxacin: S <=1    -  Ertapenem: S <=0.5    -  Ertapenem: S <=0.5    -  Erythromycin: R >4    -  Imipenem: S <=1    -  Imipenem: I 2    -  Gentamicin: S 2    -  Gentamicin: S <=1    -  Levofloxacin: R >4    -  Levofloxacin: R >4    -  Meropenem: S <=1    -  Meropenem: S <=1    -  Piperacillin/Tazobactam: R >64    -  Piperacillin/Tazobactam: S <=8    -  Tetra/Doxy: R >8    -  Tobramycin: R >8    -  Tobramycin: S <=2    -  Trimethoprim/Sulfamethoxazole: R >2/38    -  Trimethoprim/Sulfamethoxazole: R >2/38    -  Vancomycin: S 2    Specimen Source: .Abscess SCROTAL PUSS    Culture Results:   Rare Escherichia coli ESBL  Rare Morganella morganii  Few Enterococcus faecalis  Few Candida albicans    Organism Identification: Escherichia coli ESBL  Morganella morganii  Enterococcus faecalis    Organism: Escherichia coli ESBL    Organism: Morganella morganii    Organism: Enterococcus faecalis    Method Type: BANG    Method Type: BANG    Method Type: BANG        Culture - Blood (01.17.18 @ 11:37)    Specimen Source: .Blood Blood-Peripheral/ONLY AEROBIC BOTTLE REC'D    Culture Results:   No growth to date.    Culture - Fungal, Other (01.18.18 @ 13:46)    Specimen Source: .Other Scrotal puss    Culture Results:   Testing in progress    Culture - Blood (01.15.18 @ 17:21)    -  Ampicillin: S <=2    Gram Stain:   Growth in aerobic bottle: Gram Positive Cocci in Pairs and Chains    -  Vancomycin: S 2    -  Gentamicin synergy: S    -  Enterococcus species: Detec    Specimen Source: .Blood None    Organism: Blood Culture PCR    Organism: Enterococcus faecalis    Culture Results:   Growth in aerobic bottle: Enterococcus faecalis  ***Blood Panel PCR results on this specimen are available  approximately 3 hours after the Gram stain result.***  Gram stain, PCR, and/or culture results may not always  correspond due to difference in methodologies.  ************************************************************  This PCR assay was performed using [employer].  The following targets are tested for: Enterococcus,  vancomycin resistant enterococci, Listeria monocytogenes,  coagulase negative staphylococci, S. aureus,  methicillin resistant S. aureus, Streptococcus agalactiae  (Group B), S. pneumoniae, S. pyogenes (Group A),  Acinetobacter baumannii, Enterobacter cloacae, E. coli,  Klebsiella oxytoca, K. pneumoniae, Proteussp.,  Serratia marcescens, Haemophilus influenzae,  Neisseria meningitidis, Pseudomonas aeruginosa, Candida  albicans, C. glabrata, C krusei, C parapsilosis,  C. tropicalis and the KPC resistance gene.    Organism Identification: Blood Culture PCR  Enterococcus faecalis    Method Type: PCR    Method Type: BANG        TTE no vegetations            Radiology:  < from: CT Chest No Cont (01.15.18 @ 23:23) >    EXAM:  CT ABDOMEN AND PELVIS                          EXAM:  CT CHEST                            PROCEDURE DATE:  01/15/2018          INTERPRETATION:  Chest CT without contrast dated 1/15/2018.    COMPARISON: None available.    CLINICAL INFORMATION: Sepsis.    TECHNIQUE: Contiguous axial 2.5 mm slice thickness images of the chest   were obtained without intravenous contrast administration.    FINDINGS:    The airway shows normal caliber and contour with patent lumen.  Mild dependent atelectaticchanges in the dependent portions of the lower   lobes. Some superimposed infiltrates in the posterior right lower lobe is   suspected for which clinical correlation is recommended.    There is no pleural effusion or pneumothorax.    There are no mediastinal lymphadenopathy or masses.    The mediastinum great vessels are normal. Coronary arteries are   calcified.     The heart is normal. There is no pericardial effusion.    The bones , chronic compression deformity T12. Some degenerative changes   in the thoracolumbar spine.    IMPRESSION: Some infiltrates, and/or atelectatic changes in the right   lower lobe?,suggestive of mild pneumonia for which clinical correlation   is recommended.    Non contrast CT of the abdomen and pelvis dated 1/15/2018.     COMPARISON: 12/28/17.    CLINICAL HISTORY: Sepsis.    Technique: contiguous axial images were obtained with 2.5 mm slice   thickness without intravenous or oral contrast administration, which   limits the visualization of the intra-abdominalstructures.   Coronal and sagittal reformats were also submitted for interpretation.      FINDINGS:     There is no free intra-abdominal air or ascites.     The unopacified liver, spleen, pancreas, adrenal glands and gallbladder   are normal.         Advanced directives addressed: full resuscitation
CC:  Patient is a 82y old  Male who presents with a chief complaint of Sent from [facility]  for abnormal labs (15 Yonny 2018 20:55)    SUBJECTIVE:  CO at bedside.  confused.  Tm 103.7 01/20 @ 1700H.    ROS:  nonverbal.    MEDICATIONS  (STANDING):  aspirin enteric coated 81 milliGRAM(s) Oral daily  dextrose 5%. 1000 milliLiter(s) (100 mL/Hr) IV Continuous <Continuous>  dextrose 50% Injectable 12.5 Gram(s) IV Push once  dextrose 50% Injectable 25 Gram(s) IV Push once  dextrose 50% Injectable 25 Gram(s) IV Push once  finasteride 5 milliGRAM(s) Oral daily  heparin  Injectable 5000 Unit(s) SubCutaneous every 8 hours  insulin lispro (HumaLOG) corrective regimen sliding scale   SubCutaneous three times a day before meals  meropenem IVPB 500 milliGRAM(s) IV Intermittent every 12 hours  tamsulosin 0.4 milliGRAM(s) Oral at bedtime    MEDICATIONS  (PRN):  acetaminophen  Suppository 650 milliGRAM(s) Rectal every 6 hours PRN For Temp greater than 38.5 C (101.3 F)  dextrose Gel 1 Dose(s) Oral once PRN Blood Glucose LESS THAN 70 milliGRAM(s)/deciliter  glucagon  Injectable 1 milliGRAM(s) IntraMuscular once PRN Glucose LESS THAN 70 milligrams/deciliter    Vital Signs Last 24 Hrs  T(C): 37.9 (21 Jan 2018 10:00), Max: 39.8 (20 Jan 2018 17:00)  T(F): 100.2 (21 Jan 2018 10:00), Max: 103.7 (20 Jan 2018 17:00)  HR: 89 (21 Jan 2018 11:00) (71 - 107)  BP: 125/66 (21 Jan 2018 11:00) (95/45 - 151/61)  BP(mean): 79 (21 Jan 2018 11:00) (56 - 89)  RR: 19 (21 Jan 2018 11:00) (16 - 27)  SpO2: 97% (21 Jan 2018 11:00) (94% - 100%)    GENERAL: comfortable   HEAD:  Atraumatic, Normocephalic  EYES: EOMI, conjunctiva and sclera clear  HEENT: dry mucous membranes  NECK: Supple, No JVD  NERVOUS SYSTEM: Hard to comment,  opens eys  CHEST/LUNG: Clear to auscultation bilaterally; No rales, rhonchi, wheezing, or rubs  HEART:S1S2 normal, no murmer  ABDOMEN: Soft, Nontender, Nondistended; Bowel sounds present  GENITOURINARY: (+) b/l nephrostomy tubes draining clear pale urine.  EXTREMITIES:  2+ Peripheral Pulses, No clubbing, cyanosis, or edema  MUSCULOSKELTAL- No muscle tenderness, No joint tenderness, no Joint swelling,   SKIN-no rash, no lesion  LYMPH Node- No palpable lymph node                              8.7    10.5  )-----------( 130      ( 21 Jan 2018 06:18 )             27.8     01-21    152<H>  |  120<H>  |  60<H>  ----------------------------<  183<H>  4.5   |  25  |  3.45<H>    Ca    8.4<L>      21 Jan 2018 06:18  Phos  4.0     01-21    TPro  x   /  Alb  1.8<L>  /  TBili  x   /  DBili  x   /  AST  x   /  ALT  x   /  AlkPhos  x   01-21
CC:  Patient is a 82y old  Male who presents with a chief complaint of Sent from [facility]  for abnormal labs (15 Yonny 2018 20:55)    SUBJECTIVE:  CO at bedside.  very confused and disoriented.  RN reports patient pouching his food and not swallowing.    ROS:  nonverbal.    MEDICATIONS  (STANDING):  aspirin enteric coated 81 milliGRAM(s) Oral daily  dextrose 5%. 1000 milliLiter(s) (100 mL/Hr) IV Continuous <Continuous>  dextrose 50% Injectable 12.5 Gram(s) IV Push once  dextrose 50% Injectable 25 Gram(s) IV Push once  dextrose 50% Injectable 25 Gram(s) IV Push once  finasteride 5 milliGRAM(s) Oral daily  fluconAZOLE   Tablet 100 milliGRAM(s) Oral daily  heparin  Injectable 5000 Unit(s) SubCutaneous every 8 hours  insulin lispro (HumaLOG) corrective regimen sliding scale   SubCutaneous three times a day before meals  meropenem IVPB 500 milliGRAM(s) IV Intermittent every 12 hours  tamsulosin 0.4 milliGRAM(s) Oral at bedtime    MEDICATIONS  (PRN):  acetaminophen  Suppository 650 milliGRAM(s) Rectal every 6 hours PRN For Temp greater than 38.5 C (101.3 F)  dextrose Gel 1 Dose(s) Oral once PRN Blood Glucose LESS THAN 70 milliGRAM(s)/deciliter  glucagon  Injectable 1 milliGRAM(s) IntraMuscular once PRN Glucose LESS THAN 70 milligrams/deciliter    Vital Signs Last 24 Hrs  T(C): 37.2 (22 Jan 2018 09:04), Max: 37.9 (21 Jan 2018 17:45)  T(F): 98.9 (22 Jan 2018 09:04), Max: 100.3 (21 Jan 2018 17:45)  HR: 73 (22 Jan 2018 11:00) (67 - 92)  BP: 123/58 (22 Jan 2018 11:00) (109/58 - 144/67)  BP(mean): 76 (22 Jan 2018 11:00) (68 - 98)  RR: 19 (22 Jan 2018 11:00) (17 - 23)  SpO2: 98% (22 Jan 2018 11:00) (97% - 100%)    GENERAL: comfortable   HEAD:  Atraumatic, Normocephalic  EYES: EOMI, conjunctiva and sclera clear  HEENT: dry mucous membranes  NECK: Supple, No JVD  NERVOUS SYSTEM: Hard to comment,  opens eys  CHEST/LUNG: Clear to auscultation bilaterally; No rales, rhonchi, wheezing, or rubs  HEART:S1S2 normal, no murmer  ABDOMEN: Soft, Nontender, Nondistended; Bowel sounds present  GENITOURINARY: (+) b/l nephrostomy tubes.  right hematuria.  left draining clear pale urine.  EXTREMITIES:  no edema.  MUSCULOSKELTAL- No muscle tenderness, No joint tenderness, no Joint swelling,   SKIN-no rash, no lesion  LYMPH Node- No palpable lymph node                         9.1    12.0  )-----------( 136      ( 22 Jan 2018 06:25 )             28.6     01-22    148<H>  |  114<H>  |  49<H>  ----------------------------<  175<H>  3.8   |  24  |  2.58<H>    Ca    8.5      22 Jan 2018 06:25  Phos  3.3     01-22    TPro  x   /  Alb  1.8<L>  /  TBili  x   /  DBili  x   /  AST  x   /  ALT  x   /  AlkPhos  x   01-22
CC:  Patient is a 82y old  Male who presents with a chief complaint of Sent from [facility]  for abnormal labs (15 Yonny 2018 20:55)    SUBJECTIVE:  CO at bedside.  family at bedsided.  confused.    ROS:  nonverbal.    MEDICATIONS  (STANDING):  aspirin enteric coated 81 milliGRAM(s) Oral daily  dextrose 50% Injectable 12.5 Gram(s) IV Push once  dextrose 50% Injectable 25 Gram(s) IV Push once  dextrose 50% Injectable 25 Gram(s) IV Push once  finasteride 5 milliGRAM(s) Oral daily  heparin  Injectable 5000 Unit(s) SubCutaneous every 8 hours  insulin lispro (HumaLOG) corrective regimen sliding scale   SubCutaneous three times a day before meals  meropenem IVPB 500 milliGRAM(s) IV Intermittent every 12 hours  sodium bicarbonate  Infusion 0.065 mEq/kG/Hr (100 mL/Hr) IV Continuous <Continuous>  tamsulosin 0.4 milliGRAM(s) Oral at bedtime    MEDICATIONS  (PRN):  dextrose Gel 1 Dose(s) Oral once PRN Blood Glucose LESS THAN 70 milliGRAM(s)/deciliter  glucagon  Injectable 1 milliGRAM(s) IntraMuscular once PRN Glucose LESS THAN 70 milligrams/deciliter    Vital Signs Last 24 Hrs  T(C): 36.8 (20 Jan 2018 08:38), Max: 38.2 (19 Jan 2018 16:13)  T(F): 98.3 (20 Jan 2018 08:38), Max: 100.7 (19 Jan 2018 16:13)  HR: 98 (20 Jan 2018 10:01) (72 - 130)  BP: 127/53 (20 Jan 2018 10:01) (82/47 - 173/77)  BP(mean): 69 (20 Jan 2018 10:01) (56 - 90)  RR: 21 (20 Jan 2018 10:01) (17 - 33)  SpO2: 100% (20 Jan 2018 10:01) (96% - 100%)    GENERAL: comfortable   HEAD:  Atraumatic, Normocephalic  EYES: EOMI, conjunctiva and sclera clear  HEENT: dry mucous membranes  NECK: Supple, No JVD  NERVOUS SYSTEM: Hard to comment,  opens eys  CHEST/LUNG: Clear to auscultation bilaterally; No rales, rhonchi, wheezing, or rubs  HEART:S1S2 normal, no murmer  ABDOMEN: Soft, Nontender, Nondistended; Bowel sounds present  GENITOURINARY: (+) b/l nephrostomy tubes draining clear pale urine.  EXTREMITIES:  2+ Peripheral Pulses, No clubbing, cyanosis, or edema  MUSCULOSKELTAL- No muscle tenderness, No joint tenderness, no Joint swelling,   SKIN-no rash, no lesion  LYMPH Node- No palpable lymph node             01-20    148<H>  |  119<H>  |  73<H>  ----------------------------<  132<H>  5.3   |  19<L>  |  4.57<H>    Ca    8.1<L>      20 Jan 2018 05:10  Phos  6.1     01-20    TPro  x   /  Alb  1.6<L>  /  TBili  x   /  DBili  x   /  AST  x   /  ALT  x   /  AlkPhos  x   01-20
CC:  Patient is a 82y old  Male who presents with a chief complaint of Sent from [facility]  for abnormal labs (15 Yonny 2018 20:55)    SUBJECTIVE:  arrousale, but nonverbal, confused and disoriented.    ROS:  unobtainable.    acetaminophen  Suppository 650 milliGRAM(s) Rectal every 6 hours PRN  aspirin enteric coated 81 milliGRAM(s) Oral daily  finasteride 5 milliGRAM(s) Oral daily  fluconAZOLE IVPB      fluconAZOLE IVPB 100 milliGRAM(s) IV Intermittent every 24 hours  heparin  Injectable 5000 Unit(s) SubCutaneous every 8 hours  meropenem IVPB 500 milliGRAM(s) IV Intermittent every 12 hours  tamsulosin 0.4 milliGRAM(s) Oral at bedtime    T(C): 36.6 (01-24-18 @ 05:40), Max: 37.8 (01-23-18 @ 17:00)  HR: 87 (01-24-18 @ 09:00) (70 - 91)  BP: 111/61 (01-24-18 @ 09:00) (93/66 - 119/52)  RR: 24 (01-24-18 @ 09:00) (14 - 25)  SpO2: 100% (01-24-18 @ 09:00) (97% - 100%)    PHYSICAL EXAM:  GENERAL: comfortable   HEAD:  Atraumatic, Normocephalic  EYES: EOMI, conjunctiva and sclera clear  HEENT: dry mucous membranes  NECK: Supple, No JVD  CHEST/LUNG: Clear to auscultation bilaterally; No rales, rhonchi, wheezing, or rubs  HEART:S1S2 normal, no murmer  ABDOMEN: Soft, Nontender, Nondistended; Bowel sounds present  GENITOURINARY: (+) b/l nephrostomy tubes.  clear pale urine draining.  EXTREMITIES:  no edema.                        8.1    10.6  )-----------( 133      ( 23 Jan 2018 04:44 )             25.7     01-24    144  |  111<H>  |  37<H>  ----------------------------<  110<H>  3.7   |  24  |  1.87<H>    Ca    8.0<L>      24 Jan 2018 05:59  Phos  3.0     01-23  Mg     1.6     01-23
CC:  Patient is a 82y old  Male who presents with a chief complaint of Sent from [facility]  for abnormal labs (15 Yonny 2018 20:55)    SUBJECTIVE:  not eating.  pocketing food and medications in his mouth.    ROS:  nonverbal.    acetaminophen  Suppository 650 milliGRAM(s) Rectal every 6 hours PRN  aspirin enteric coated 81 milliGRAM(s) Oral daily  dextrose 50% Injectable 12.5 Gram(s) IV Push once  dextrose 50% Injectable 25 Gram(s) IV Push once  dextrose 50% Injectable 25 Gram(s) IV Push once  dextrose Gel 1 Dose(s) Oral once PRN  finasteride 5 milliGRAM(s) Oral daily  fluconAZOLE IVPB      glucagon  Injectable 1 milliGRAM(s) IntraMuscular once PRN  heparin  Injectable 5000 Unit(s) SubCutaneous every 8 hours  insulin lispro (HumaLOG) corrective regimen sliding scale   SubCutaneous three times a day before meals  meropenem IVPB 500 milliGRAM(s) IV Intermittent every 12 hours  tamsulosin 0.4 milliGRAM(s) Oral at bedtime    T(C): 36.1 (01-23-18 @ 08:02), Max: 36.8 (01-22-18 @ 13:56)  HR: 62 (01-23-18 @ 09:00) (60 - 83)  BP: 96/52 (01-23-18 @ 09:00) (92/54 - 138/63)  RR: 21 (01-23-18 @ 09:00) (16 - 23)  SpO2: 100% (01-23-18 @ 09:00) (98% - 100%)    PHYSICAL EXAM:  GENERAL: comfortable   HEAD:  Atraumatic, Normocephalic  EYES: EOMI, conjunctiva and sclera clear  HEENT: dry mucous membranes  NECK: Supple, No JVD  NERVOUS SYSTEM: Hard to comment,  opens eys  CHEST/LUNG: Clear to auscultation bilaterally; No rales, rhonchi, wheezing, or rubs  HEART:S1S2 normal, no murmer  ABDOMEN: Soft, Nontender, Nondistended; Bowel sounds present  GENITOURINARY: (+) b/l nephrostomy tubes.  right hematuria.  left draining clear pale urine.  EXTREMITIES:  no edema.  MUSCULOSKELTAL- No muscle tenderness, No joint tenderness, no Joint swelling,   SKIN-no rash, no lesion  LYMPH Node- No palpable lymph node                                  8.1    10.6  )-----------( 133      ( 23 Jan 2018 04:44 )             25.7     01-23    143  |  109<H>  |  40<H>  ----------------------------<  164<H>  3.9   |  26  |  2.08<H>    Ca    8.1<L>      23 Jan 2018 04:44  Phos  3.0     01-23  Mg     1.6     01-23    TPro  x   /  Alb  1.8<L>  /  TBili  x   /  DBili  x   /  AST  x   /  ALT  x   /  AlkPhos  x   01-22
CHIEF COMPLAINT: Patient is a 82y old  Male who presents with a chief complaint of Sent from [facility]  for abnormal labs (15 Yonny 2018 20:55)      HPI: HPI:  82yo M w/PMHx of HTN, Dementia, Major Depressive  Disorder, BPH with indwelling loredo  BIBA from [facility] for abnormal labs .   Per paperwork: baseline dementia, disoriented and non ambulatory at baseline . Labs reviewed from paperwork: January 8th 2018 Cr - 2.8   January 12th 2018 Cr - 3.24 and Today- Cr: 4.38; Unable to obtain history from patient due to demntia.patient opens eyes to verbal stimulus,does not make eye contact,nonverbal during my exam,moves all extremities spontaneously, does not follow commands  ; admitted dec 2017  for acute renal failure from obstructive uropathy and dehydration  and UTi with blood cx positive for proteus .completed course of ceftin in rehab    In Ed Tmax 99 F, , /73, pulse ox 96% RA  Patient was found to have PRABHU , K5, Mg 2.7, Na 159, lactate 4.5,   troponin 0.188, EKG sinus tachycardia 109bpm, ST depression in anterior leads- anterior subendocardial injury unchanged and no significant changes from EKG done 12/26/17   CXR chronic small left pleural effusion vs chronic left atelectasis unchanged from CXr done 12/27/17    in Ed patient received 2 liters NS, ceftriaxone, asa suppository  loredo replaced in ED making urine output 1000cc in ED per RN after new loredo placed     family not present at bedside (15 Yonny 2018 20:55)    1/17/18:  Patient is still in ED hold      PMHx: PAST MEDICAL & SURGICAL HISTORY:  Nasal bone fractures  Posture abnormality  Gait abnormality  Dementia with behavioral disturbance, unspecified dementia type  Macular degeneration  Depression  Hypertension  Dementia  No significant past surgical history        Soc Hx:     FAMILY HISTORY:  No pertinent family history in first degree relatives      Allergies: Allergies    No Known Allergies    Intolerances          REVIEW OF SYSTEMS:    As above  No chest pain or shortness of breath  No lightheadeness or syncope  No leg swelling  No palpitations  No claudication-like symptoms    ICU Vital Signs Last 24 Hrs  T(C): 37.1 (17 Jan 2018 05:28), Max: 37.2 (16 Jan 2018 23:25)  T(F): 98.8 (17 Jan 2018 05:28), Max: 98.9 (16 Jan 2018 23:25)  HR: 81 (17 Jan 2018 05:28) (77 - 88)  BP: 150/80 (17 Jan 2018 05:28) (120/78 - 153/77)  BP(mean): --  ABP: --  ABP(mean): --  RR: 18 (16 Jan 2018 23:25) (16 - 20)  SpO2: 100% (17 Jan 2018 05:28) (97% - 100%)      I&O's Summary    15 Yonny 2018 07:01  -  16 Jan 2018 07:00  --------------------------------------------------------  IN: 0 mL / OUT: 2600 mL / NET: -2600 mL        CAPILLARY BLOOD GLUCOSE          PHYSICAL EXAM:   Patient in NAD  Neck: No JVD; Carotids:  2+ without bruits  Respiratory:  Clear to A&P  Cardiovascular: S1 and S2, regular rate and rhythm, no Murmurs, gallops or rubs  Gastrointestinal:  Soft, non-tender; BS positive  Extremities: No peripheral edema  Vascular: 2+ peripheral pulses  Neurological: A/O x 3, no focal deficits    MEDICATIONS  (STANDING):  aspirin enteric coated 81 milliGRAM(s) Oral daily  dextrose 5%. 1000 milliLiter(s) (100 mL/Hr) IV Continuous <Continuous>  finasteride 5 milliGRAM(s) Oral daily  heparin  Injectable 5000 Unit(s) SubCutaneous every 8 hours  piperacillin/tazobactam IVPB. 3.375 Gram(s) IV Intermittent every 12 hours  tamsulosin 0.4 milliGRAM(s) Oral at bedtime        LABS: All Labs Reviewed:  Blood Culture:   BNP   CBC             WBC Count: 10.1 K/uL (01-16 @ 06:07)  WBC Count: 10.2 K/uL (01-15 @ 17:21)              Hemoglobin: 8.7 g/dL (01-16 @ 06:07)  Hemoglobin: 10.1 g/dL (01-15 @ 17:21)              Hematocrit: 27.8 % (01-16 @ 06:07)  Hematocrit: 32.6 % (01-15 @ 17:21)              Mean Cell Volume: 91.1 fl (01-16 @ 06:07)  Mean Cell Volume: 91.1 fl (01-15 @ 17:21)              Platelet Count - Automated: 157 K/uL (01-16 @ 06:07)  Platelet Count - Automated: 188 K/uL (01-15 @ 17:21)                            Cardiac markers             Troponin I, Serum: 0.113 ng/mL (01-16 @ 06:07)  Troponin I, Serum: 0.142 ng/mL (01-15 @ 22:13)  Troponin I, Serum: 0.188 ng/mL (01-15 @ 17:21)                             Chems        Sodium, Serum: 157 mmol/L (01-16 @ 06:07)  Sodium, Serum: 160 mmol/L (01-15 @ 22:13)  Sodium, Serum: 159 mmol/L (01-15 @ 17:21)          Potassium, Serum: 4.5 mmol/L (01-16 @ 06:07)  Potassium, Serum: 5.0 mmol/L (01-15 @ 22:13)  Potassium, Serum: 5.0 mmol/L (01-15 @ 17:21)          Blood Urea Nitrogen, Serum: 86 mg/dL (01-16 @ 06:07)  Blood Urea Nitrogen, Serum: 89 mg/dL (01-15 @ 22:13)  Blood Urea Nitrogen, Serum: 95 mg/dL (01-15 @ 17:21)          Creatinine 3.82  Creatinine 4.08  Creatinine 4.72          Magnesium, Serum: 2.4 mg/dL (01-16 @ 06:07)  Magnesium, Serum: 2.4 mg/dL (01-15 @ 22:13)  Magnesium, Serum: 2.7 mg/dL (01-15 @ 17:21)          Protein Total, Serum: 6.9 gm/dL (01-16 @ 06:07)  Protein Total, Serum: 8.3 gm/dL (01-15 @ 17:21)                  Calcium, Total Serum: 8.7 mg/dL (01-16 @ 06:07)  Calcium, Total Serum: 8.9 mg/dL (01-15 @ 22:13)  Calcium, Total Serum: 9.3 mg/dL (01-15 @ 17:21)          Phosphorus Level, Serum: 3.6 mg/dL (01-16 @ 06:07)          Bilirubin Total, Serum: 0.3 mg/dL (01-16 @ 06:07)  Bilirubin Total, Serum: 0.4 mg/dL (01-15 @ 17:21)          Alanine Aminotransferase (ALT/SGPT): 20 U/L (01-16 @ 06:07)  Alanine Aminotransferase (ALT/SGPT): 28 U/L (01-15 @ 17:21)          Aspartate Aminotransferase (AST/SGOT): 6 U/L (01-16 @ 06:07)  Aspartate Aminotransferase (AST/SGOT): 12 U/L (01-15 @ 17:21)                 INR: 1.24 ratio (01-15 @ 17:21)             RADIOLOGY:    EKG:  ST; RBBB with significant repolarization changes/ischemic-like changes which was seen on previous Ekg    Telemetry: Sinus    ECHO:
CHIEF COMPLAINT: pyuria    HISTORY OF PRESENT ILLNESS: Status ppose express scrotal abscess and re insertion loredo. Post procedure shows loredo in bladder appropriately. However, lroedo not draining and urine and pus comes out around loredo through meatus. Issues are likely from repeated trauma to loredo from pt pulling on it. There is still significant bilateral hydro. Discussed with pt's son. Suggest bilateral nephrostomy tube placemen t to divert urine from bladder and also treat bilateral hydro. The tubes would likely be permanent. Son agrees to have pcn's placed.    PAST MEDICAL & SURGICAL HISTORY:  Nasal bone fractures  Posture abnormality  Gait abnormality  Dementia with behavioral disturbance, unspecified dementia type  Macular degeneration  Depression  Hypertension  Dementia  No significant past surgical history      REVIEW OF SYSTEMS:Same previous  MEDICATIONS  (STANDING):  aspirin enteric coated 81 milliGRAM(s) Oral daily  dextrose 5%. 1000 milliLiter(s) (50 mL/Hr) IV Continuous <Continuous>  dextrose 50% Injectable 12.5 Gram(s) IV Push once  dextrose 50% Injectable 25 Gram(s) IV Push once  dextrose 50% Injectable 25 Gram(s) IV Push once  finasteride 5 milliGRAM(s) Oral daily  heparin  Injectable 5000 Unit(s) SubCutaneous every 8 hours  insulin lispro (HumaLOG) corrective regimen sliding scale   SubCutaneous three times a day before meals  meropenem IVPB 500 milliGRAM(s) IV Intermittent every 12 hours  tamsulosin 0.4 milliGRAM(s) Oral at bedtime    MEDICATIONS  (PRN):  dextrose Gel 1 Dose(s) Oral once PRN Blood Glucose LESS THAN 70 milliGRAM(s)/deciliter  glucagon  Injectable 1 milliGRAM(s) IntraMuscular once PRN Glucose LESS THAN 70 milligrams/deciliter      PE:Same Previous    Allergies    No Known Allergies    Intolerances        SOCIAL HISTORY:Same previous    FAMILY HISTORY:  No pertinent family history in first degree relatives      Vital Signs Last 24 Hrs  T(C): 37.5 (19 Jan 2018 09:01), Max: 37.5 (19 Jan 2018 09:01)  T(F): 99.5 (19 Jan 2018 09:01), Max: 99.5 (19 Jan 2018 09:01)  HR: 80 (19 Jan 2018 08:00) (66 - 87)  BP: 100/52 (19 Jan 2018 08:00) (90/51 - 128/71)  BP(mean): 63 (19 Jan 2018 08:00) (63 - 84)  RR: 23 (19 Jan 2018 08:00) (15 - 23)  SpO2: 99% (19 Jan 2018 08:00) (96% - 100%)    PHYSICAL EXAM:Same previous    LABS:    01-19    149<H>  |  118<H>  |  72<H>  ----------------------------<  135<H>  5.1   |  21<L>  |  4.39<H>    Ca    8.5      19 Jan 2018 06:32  Phos  5.4     01-19    TPro  x   /  Alb  1.7<L>  /  TBili  x   /  DBili  x   /  AST  x   /  ALT  x   /  AlkPhos  x   01-19        Urine Culture: 01-18 @ 13:46  Urine Culure Resuls   Testing in progress  Organism --      RADIOLOGY & ADDITIONAL STUDIES:
CHIEF COMPLAINT: sepsis    HISTORY OF PRESENT ILLNESS: status post placement bilateral nephrostomy tubes. Initially with purulent fluid recovered reported by IR. Now urine in nicolas. Loredo not draining. I removed loredo and expressed purulent fluid from meatus with gentle pressure on scrotum.    PAST MEDICAL & SURGICAL HISTORY:  Nasal bone fractures  Posture abnormality  Gait abnormality  Dementia with behavioral disturbance, unspecified dementia type  Macular degeneration  Depression  Hypertension  Dementia  No significant past surgical history      REVIEW OF SYSTEMS:Same previous  MEDICATIONS  (STANDING):  aspirin enteric coated 81 milliGRAM(s) Oral daily  dextrose 50% Injectable 12.5 Gram(s) IV Push once  dextrose 50% Injectable 25 Gram(s) IV Push once  dextrose 50% Injectable 25 Gram(s) IV Push once  finasteride 5 milliGRAM(s) Oral daily  heparin  Injectable 5000 Unit(s) SubCutaneous every 8 hours  insulin lispro (HumaLOG) corrective regimen sliding scale   SubCutaneous three times a day before meals  meropenem IVPB 500 milliGRAM(s) IV Intermittent every 12 hours  sodium bicarbonate  Infusion 0.065 mEq/kG/Hr (100 mL/Hr) IV Continuous <Continuous>  tamsulosin 0.4 milliGRAM(s) Oral at bedtime    MEDICATIONS  (PRN):  dextrose Gel 1 Dose(s) Oral once PRN Blood Glucose LESS THAN 70 milliGRAM(s)/deciliter  glucagon  Injectable 1 milliGRAM(s) IntraMuscular once PRN Glucose LESS THAN 70 milligrams/deciliter      PE:Same Previous    Allergies    No Known Allergies    Intolerances        SOCIAL HISTORY:Same previous    FAMILY HISTORY:  No pertinent family history in first degree relatives      Vital Signs Last 24 Hrs  T(C): 36.8 (20 Jan 2018 08:38), Max: 38.2 (19 Jan 2018 16:13)  T(F): 98.3 (20 Jan 2018 08:38), Max: 100.7 (19 Jan 2018 16:13)  HR: 98 (20 Jan 2018 10:01) (72 - 130)  BP: 127/53 (20 Jan 2018 10:01) (82/47 - 173/77)  BP(mean): 69 (20 Jan 2018 10:01) (56 - 90)  RR: 21 (20 Jan 2018 10:01) (17 - 33)  SpO2: 100% (20 Jan 2018 10:01) (96% - 100%)    PHYSICAL EXAM:Same previous    LABS:    01-20    148<H>  |  119<H>  |  73<H>  ----------------------------<  132<H>  5.3   |  19<L>  |  4.57<H>    Ca    8.1<L>      20 Jan 2018 05:10  Phos  6.1     01-20    TPro  x   /  Alb  1.6<L>  /  TBili  x   /  DBili  x   /  AST  x   /  ALT  x   /  AlkPhos  x   01-20        Urine Culture:     RADIOLOGY & ADDITIONAL STUDIES:
CHIEF COMPLAINT: urinary infection    HISTORY OF PRESENT ILLNESS:     82yo M w/PMHx of HTN, Dementia, Major Depressive  Disorder, BPH with indwelling loredo  BIBA from [facility] for abnormal labs .   Per paperwork: baseline dementia, disoriented and non ambulatory at baseline . Labs reviewed from paperwork: 2018 Cr - 2.8   2018 Cr - 3.24 and Today- Cr: 4.38; Unable to obtain history from patient due to demntia.patient opens eyes to verbal stimulus,does not make eye contact,nonverbal during my exam,moves all extremities spontaneously, does not follow commands  ; admitted dec 2017  for acute renal failure from obstructive uropathy and dehydration  and UTi with blood cx positive for proteus .completed course of ceftin in [id number] M w/PMHx of HTN, Dementia, Major Depressive  Disorder, BPH with indwelling loredo  BIBA from [facility] for abnormal labs .   Per paperwork: baseline dementia, disoriented and non ambulatory at baseline . Labs reviewed from paperwork: 2018 Cr - 2.8   2018 Cr - 3.24 and Today- Cr: 4.38; Unable to obtain history from patient due to demntia.patient opens eyes to verbal stimulus,does not make eye contact,nonverbal during my exam,moves all extremities spontaneously, does not follow commands  ; admitted dec 2017  for acute renal failure from obstructive uropathy and dehydration  and UTi with blood cx positive for proteus .completed course of ceftin in rehab    Pt currently with loredo in place. Urine cloudy. Due to dementia pt provides no history. I contacted son who denies any prior gu history including any pervious surgery and/or radiation.  Reported wlith traumatic loredo insertion where there was blood at meatus. No bleeding noted at this time. CT consistent with cystitis. There is bilateral hydro, long term with chronically elevated creatinine.            PAST MEDICAL & SURGICAL HISTORY:    Nasal bone fractures  Posture abnormality  Gait abnormality  Dementia with behavioral disturbance, unspecified dementia type  Macular degeneration  Depression  Hypertension  Dementia  No significant past surgical history      REVIEW OF SYSTEMS:Same previous  MEDICATIONS  (STANDING):  aspirin enteric coated 81 milliGRAM(s) Oral daily  dextrose 5%. 1000 milliLiter(s) (100 mL/Hr) IV Continuous <Continuous>  finasteride 5 milliGRAM(s) Oral daily  heparin  Injectable 5000 Unit(s) SubCutaneous every 8 hours  piperacillin/tazobactam IVPB. 3.375 Gram(s) IV Intermittent every 12 hours  tamsulosin 0.4 milliGRAM(s) Oral at bedtime    MEDICATIONS  (PRN):      PE:Same Previous    Allergies    No Known Allergies    Intolerances        SOCIAL HISTORY:Same previous    FAMILY HISTORY:  No pertinent family history in first degree relatives      Vital Signs Last 24 Hrs  T(C): 37 (2018 16:11), Max: 37.3 (15 Yonny 2018 20:07)  T(F): 98.6 (2018 16:11), Max: 99.1 (15 Yonny 2018 20:07)  HR: 85 (2018 16:11) (77 - 104)  BP: 150/68 (2018 16:11) (121/73 - 154/88)  BP(mean): --  RR: 20 (2018 16:11) (16 - 20)  SpO2: 97% (2018 16:11) (96% - 100%)    PHYSICAL EXAM:Same previous    LABS:                        8.7    10.1  )-----------( 157      ( 2018 06:07 )             27.8     01-16    157<H>  |  126<H>  |  86<H>  ----------------------------<  239<H>  4.5   |  23  |  3.82<H>    Ca    8.7      2018 06:07  Phos  3.6       Mg     2.4         TPro  6.9  /  Alb  1.8<L>  /  TBili  0.3  /  DBili  x   /  AST  6<L>  /  ALT  20  /  AlkPhos  160<H>  16    PT/INR - ( 15 Yonny 2018 17:21 )   PT: 13.5 sec;   INR: 1.24 ratio         PTT - ( 15 Yonny 2018 17:21 )  PTT:30.1 sec  Urinalysis Basic - ( 15 Yonny 2018 17:21 )    Color: Yellow / Appearance: very cloudy / S.025 / pH: x  Gluc: x / Ketone: Trace  / Bili: Negative / Urobili: Negative mg/dL   Blood: x / Protein: 100 mg/dL / Nitrite: Negative   Leuk Esterase: Moderate / RBC: 25-50 /HPF / WBC >50   Sq Epi: x / Non Sq Epi: Occasional / Bacteria: Moderate      Urine Culture:     RADIOLOGY & ADDITIONAL STUDIES:
CHIEF COMPLAINT: urinary retention    HISTORY OF PRESENT ILLNESS: 89 m comes to er. CT shows distended bladder. ER staff unable to insert loredo. I attempted to place coude loredo and am unsuccessful. Pt not necessarily reliable historian; denies prior gu history and no obvious gu surgery/or radiation on exam.    PAST MEDICAL & SURGICAL HISTORY:  Nasal bone fractures  Posture abnormality  Gait abnormality  Dementia with behavioral disturbance, unspecified dementia type  Macular degeneration  Depression  Hypertension  Dementia  No significant past surgical history      REVIEW OF SYSTEMS:Same previous  MEDICATIONS  (STANDING):  aspirin enteric coated 81 milliGRAM(s) Oral daily  dextrose 5%. 1000 milliLiter(s) (50 mL/Hr) IV Continuous <Continuous>  dextrose 50% Injectable 12.5 Gram(s) IV Push once  dextrose 50% Injectable 25 Gram(s) IV Push once  dextrose 50% Injectable 25 Gram(s) IV Push once  finasteride 5 milliGRAM(s) Oral daily  heparin  Injectable 5000 Unit(s) SubCutaneous every 8 hours  insulin lispro (HumaLOG) corrective regimen sliding scale   SubCutaneous three times a day before meals  meropenem IVPB 500 milliGRAM(s) IV Intermittent every 12 hours  tamsulosin 0.4 milliGRAM(s) Oral at bedtime    MEDICATIONS  (PRN):  dextrose Gel 1 Dose(s) Oral once PRN Blood Glucose LESS THAN 70 milliGRAM(s)/deciliter  glucagon  Injectable 1 milliGRAM(s) IntraMuscular once PRN Glucose LESS THAN 70 milligrams/deciliter      PE:Same Previous    Allergies    No Known Allergies    Intolerances        SOCIAL HISTORY:Same previous    FAMILY HISTORY:  No pertinent family history in first degree relatives      Vital Signs Last 24 Hrs  T(C): 37.5 (19 Jan 2018 09:01), Max: 37.5 (19 Jan 2018 09:01)  T(F): 99.5 (19 Jan 2018 09:01), Max: 99.5 (19 Jan 2018 09:01)  HR: 80 (19 Jan 2018 08:00) (66 - 87)  BP: 100/52 (19 Jan 2018 08:00) (90/51 - 128/71)  BP(mean): 63 (19 Jan 2018 08:00) (63 - 84)  RR: 23 (19 Jan 2018 08:00) (15 - 23)  SpO2: 99% (19 Jan 2018 08:00) (96% - 100%)    PHYSICAL EXAM:Same previous    LABS:    01-19    149<H>  |  118<H>  |  72<H>  ----------------------------<  135<H>  5.1   |  21<L>  |  4.39<H>    Ca    8.5      19 Jan 2018 06:32  Phos  5.4     01-19    TPro  x   /  Alb  1.7<L>  /  TBili  x   /  DBili  x   /  AST  x   /  ALT  x   /  AlkPhos  x   01-19        Urine Culture: 01-18 @ 13:46  Urine Culure Resuls   Testing in progress  Organism --      RADIOLOGY & ADDITIONAL STUDIES:
Called by RN from PACU for fluid/diet orders.  I reviewed pt's chart.  Pt is pending swallow evaluation- will hold off from placing diet order- to be addressed by day team.  Per Renal note from 1/17- pt to be on dextrose based IVF and per Hospitalist note today, IVF to continue- therefore, will resume IVF D5 at 50cc/hr- further per day team.
HPI:  HPI: Mr. Sauer is an 82y old Male coming from [place] with hx of vascular dementia with behavioral disturbance (FAST7c), depression/agitation, HTN, BPH w/ loredo, recent admission for ARF now admitted 1/15 for abnormal labs (elevated cr) at SNF. Found to have PRABHU  due to obstructive uropathy, UTI, bacteremia (Proteus), RLL infiltrate and gas in scrotum concerning for abscess with severe bilat hydro.    Palliative medicine consulted for assistance with goals of care, pt known to team from last admission.     Met Mr. Sauer this am, with 1:1 at bedside. Pt awake, attends, but does not respond verbally to questions, and is mildly agitated when touched. Otherwise appears comfortable. .     I met with pt's son Bob and daughter Neri Basurto in my office to review goals of care - see attached note        PAIN: (x )Yes   ( )No Pt cannot answer questions but he does resist examination and yells when  exam performed  Level:   Location:  Intensity:    /10  Quality:  Aggravating Factors:  Alleviating Factors:  Radiation:  Duration/Timing:  Impact on ADLs:    DYSPNEA: ( ) Yes  ( ) No  -Not objectively  Level:        Review of Systems:    Anxiety-  Depression-  Physical Discomfort-  Dyspnea-  Constipation-  Diarrhea-  Nausea-  Vomiting-  Anorexia-  Weight Loss-   Cough-  Secretions-  Fatigue-  Weakness-  Delirium-      Unable to obtain/Limited due to: pt's inability to answer questions         PHYSICAL EXAM:    Vital Signs Last 24 Hrs  T(C): 38.6 (20 Jan 2018 16:16), Max: 38.9 (20 Jan 2018 14:30)  T(F): 101.4 (20 Jan 2018 16:16), Max: 102.1 (20 Jan 2018 14:30)  HR: 103 (20 Jan 2018 18:01) (72 - 107)  BP: 116/62 (20 Jan 2018 18:01) (82/47 - 139/63)  BP(mean): 74 (20 Jan 2018 18:01) (56 - 90)  RR: 27 (20 Jan 2018 18:01) (18 - 27)  SpO2: 96% (20 Jan 2018 18:01) (96% - 100%)  Daily     Daily     PPSV2: 30  %  FAST: 7c    General: Pale elderly man, lying quietly in bed, becomes agitated during examination  HEENT: moist oral mucosa  Lungs: clear anteriorly  Cardiac: RRR  GI: +BS Soft no masses   : Loredo with no urine. Scrotum swollen and hard, tender  Back : Bilat PCNs draining nicolas urine - no blood or purulence  Ext: No edema  Neuro: No focal deficit      LABS:    01-20    148<H>  |  119<H>  |  73<H>  ----------------------------<  132<H>  5.3   |  19<L>  |  4.57<H>    Ca    8.1<L>      20 Jan 2018 05:10  Phos  6.1     01-20    TPro  x   /  Alb  1.6<L>  /  TBili  x   /  DBili  x   /  AST  x   /  ALT  x   /  AlkPhos  x   01-20      Albumin: Albumin, Serum: 1.6 g/dL (01-20 @ 05:10)      Allergies    No Known Allergies    Intolerances      MEDICATIONS  (STANDING):  aspirin enteric coated 81 milliGRAM(s) Oral daily  dextrose 50% Injectable 12.5 Gram(s) IV Push once  dextrose 50% Injectable 25 Gram(s) IV Push once  dextrose 50% Injectable 25 Gram(s) IV Push once  finasteride 5 milliGRAM(s) Oral daily  heparin  Injectable 5000 Unit(s) SubCutaneous every 8 hours  insulin lispro (HumaLOG) corrective regimen sliding scale   SubCutaneous three times a day before meals  meropenem IVPB 500 milliGRAM(s) IV Intermittent every 12 hours  sodium bicarbonate  Infusion 0.065 mEq/kG/Hr (100 mL/Hr) IV Continuous <Continuous>  tamsulosin 0.4 milliGRAM(s) Oral at bedtime    MEDICATIONS  (PRN):  acetaminophen  Suppository 650 milliGRAM(s) Rectal every 6 hours PRN For Temp greater than 38.5 C (101.3 F)  dextrose Gel 1 Dose(s) Oral once PRN Blood Glucose LESS THAN 70 milliGRAM(s)/deciliter  glucagon  Injectable 1 milliGRAM(s) IntraMuscular once PRN Glucose LESS THAN 70 milligrams/deciliter      RADIOLOGY: < from: CT Abdomen and Pelvis No Cont (01.18.18 @ 17:05) >  REPRODUCTIVE ORGANS: Mildly enlarged prostate.  BLADDER: Loredo catheter balloon within the urinary bladder lumen. Small   volume thick-walled and trabeculated bladder compatiblewith chronic   outlet obstruction. Contrast administered during intraoperative cystogram   earlier today is seen within collections in and adjacent to the penis and   at inferior base of the penis. The largest collection is contiguous with   the bulbous urethra and measures 5.8 x 2.6 by x 3.7 cm.    ABDOMINAL WALL: Small fat-containing umbilical hernia.  BONES: No acute bony abnormality. Right hip arthroplasty.    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    Severe bilateral hydroureteronephrosis.    < end of copied text >
HPI: 84yo M w/PMHx of HTN, Dementia, Major Depressive  Disorder, BPH with indwelling loredo  BIBA from [facility] for abnormal labs .   Per paperwork: baseline dementia, disoriented and non ambulatory at baseline . Labs reviewed from paperwork: 2018 Cr - 2.8   2018 Cr - 3.24 and Today- Cr: 4.38; Unable to obtain history from patient due to demntia.patient opens eyes to verbal stimulus,does not make eye contact,nonverbal during my exam,moves all extremities spontaneously, does not follow commands  ; admitted dec 2017  for acute renal failure from obstructive uropathy and dehydration  and UTi with blood cx positive for proteus .completed course of ceftin in rehab    : pt does not give any history      PHYSICAL EXAM:    Daily Height in cm: 172.72 (15 Yonny 2018 18:27)    Daily     ICU Vital Signs Last 24 Hrs  T(C): 37 (2018 16:11), Max: 37.3 (15 Yonny 2018 20:07)  T(F): 98.6 (2018 16:11), Max: 99.1 (15 Yonny 2018 20:07)  HR: 85 (2018 16:11) (77 - 104)  BP: 150/68 (2018 16:11) (121/73 - 154/88)  BP(mean): --  ABP: --  ABP(mean): --  RR: 20 (2018 16:11) (16 - 20)  SpO2: 97% (2018 16:11) (96% - 100%)      Constitutional: Weak and ill appearing  HEENT: Atraumatic,   Respiratory: Breath Sounds normal, no rhonchi/wheeze  Cardiovascular: N S1S2; DANIEL present  Gastrointestinal: Abdomen soft, non tender, Bowel Sounds present  Extremities: No edema, peripheral pulses present  Neurological: arousable, not alert or oriented  Genitourinary: deferred  Rectal: Deferred                          8.7    10.1  )-----------( 157      ( 2018 06:07 )             27.8       CBC Full  -  ( 2018 06:07 )  WBC Count : 10.1 K/uL  Hemoglobin : 8.7 g/dL  Hematocrit : 27.8 %  Platelet Count - Automated : 157 K/uL  Mean Cell Volume : 91.1 fl  Mean Cell Hemoglobin : 28.4 pg  Mean Cell Hemoglobin Concentration : 31.2 gm/dL  Auto Neutrophil # : 8.4 K/uL  Auto Lymphocyte # : 0.9 K/uL  Auto Monocyte # : 0.7 K/uL  Auto Eosinophil # : 0.1 K/uL  Auto Basophil # : 0.0 K/uL  Auto Neutrophil % : 82.9 %  Auto Lymphocyte % : 8.9 %  Auto Monocyte % : 6.8 %  Auto Eosinophil % : 1.0 %  Auto Basophil % : 0.4 %          157<H>  |  126<H>  |  86<H>  ----------------------------<  239<H>  4.5   |  23  |  3.82<H>    Ca    8.7      2018 06:07  Phos  3.6       Mg     2.4         TPro  6.9  /  Alb  1.8<L>  /  TBili  0.3  /  DBili  x   /  AST  6<L>  /  ALT  20  /  AlkPhos  160<H>        LIVER FUNCTIONS - ( 2018 06:07 )  Alb: 1.8 g/dL / Pro: 6.9 gm/dL / ALK PHOS: 160 U/L / ALT: 20 U/L / AST: 6 U/L / GGT: x             PT/INR - ( 15 Yonny 2018 17:21 )   PT: 13.5 sec;   INR: 1.24 ratio         PTT - ( 15 Yonny 2018 17:21 )  PTT:30.1 sec    CARDIAC MARKERS ( 2018 06:07 )  0.113 ng/mL / x     / 45 U/L / x     / x      CARDIAC MARKERS ( 15 Yonny 2018 22:13 )  0.142 ng/mL / x     / 48 U/L / x     / x      CARDIAC MARKERS ( 15 Yonny 2018 17:21 )  0.188 ng/mL / x     / x     / x     / x            Urinalysis Basic - ( 15 Yonny 2018 17:21 )    Color: Yellow / Appearance: very cloudy / S.025 / pH: x  Gluc: x / Ketone: Trace  / Bili: Negative / Urobili: Negative mg/dL   Blood: x / Protein: 100 mg/dL / Nitrite: Negative   Leuk Esterase: Moderate / RBC: 25-50 /HPF / WBC >50   Sq Epi: x / Non Sq Epi: Occasional / Bacteria: Moderate            MEDICATIONS  (STANDING):  aspirin enteric coated 81 milliGRAM(s) Oral daily  dextrose 5%. 1000 milliLiter(s) (100 mL/Hr) IV Continuous <Continuous>  finasteride 5 milliGRAM(s) Oral daily  heparin  Injectable 5000 Unit(s) SubCutaneous every 8 hours  piperacillin/tazobactam IVPB. 3.375 Gram(s) IV Intermittent every 12 hours  tamsulosin 0.4 milliGRAM(s) Oral at bedtime
HPI: Pt seen and examined this afternoon in follow up for sx, 1:1 at bedside. Pt remains non-verbal, though opens eyes, does not follow commands. Allows exam, appears comfortable. As per 1;1 pt has been calm unless moved too much and continues to pocket food.       PAIN: no non-verbal signs of pain  DYSPNEA: no non-verbal signs of dyspnea    ROS:    unable to gather 2/2 to end stage dementia      PHYSICAL EXAM:    Vital Signs Last 24 Hrs  T(C): 37.1 (2018 11:57), Max: 37.8 (2018 17:00)  T(F): 98.8 (2018 11:57), Max: 100.1 (2018 17:00)  HR: 84 (2018 13:00) (71 - 91)  BP: 138/67 (2018 13:00) (93/66 - 138/67)  BP(mean): 75 (2018 13:00) (63 - 77)  RR: 22 (2018 13:00) (14 - 25)  SpO2: 98% (2018 13:00) (97% - 100%)  Daily     Daily Weight in k (2018 05:40)    PPSV2: 20  %  FAST:7c    General: Elderly male lying in bed, attends, awake, does not respond verbally or follow commands, but allows exam with minimal agitation  Mental Status: unable to gather  HEENT: mmm, perrl , eomi, temporal wasting  Lungs: dec at bases  Cardiac: +s1 s2 rrr  GI: soft nt nd +bs  : loredo in place  Ext: no edema  Neuro: limited by mental status, does not follow commands, responds to touch, moves all extremities spontaneously    LABS:                        8.1    10.6  )-----------( 133      ( 2018 04:44 )             25.7         144  |  111<H>  |  37<H>  ----------------------------<  110<H>  3.7   |  24  |  1.87<H>    Ca    8.0<L>      2018 05:59  Phos  3.0       Mg     1.6             Albumin: Albumin, Serum: 1.8 g/dL ( @ 06:25)      Allergies    No Known Allergies    Intolerances      MEDICATIONS  (STANDING):  aspirin enteric coated 81 milliGRAM(s) Oral daily  finasteride 5 milliGRAM(s) Oral daily  fluconAZOLE IVPB      fluconAZOLE IVPB 100 milliGRAM(s) IV Intermittent every 24 hours  heparin  Injectable 5000 Unit(s) SubCutaneous every 8 hours  meropenem IVPB 500 milliGRAM(s) IV Intermittent every 12 hours  tamsulosin 0.4 milliGRAM(s) Oral at bedtime    MEDICATIONS  (PRN):  acetaminophen  Suppository 650 milliGRAM(s) Rectal every 6 hours PRN For Temp greater than 38.5 C (101.3 F)      RADIOLOGY:
HPI: Pt seen and examined this am in follow for sx and GOC. Pt lethargic, opens eyes briefly to touch, with some very mild restlessness, but no verbal responses. Appears calm and comfortable. 1:1 at bedside noting only some moving in bed, but otherwise calm.     Called monica Rojas in follow up to [facility] meeting held with Dr. Sacks-Berg on 1/20, namely in reference to completing MOLsT form. Son noted he and his siblings intended to get together this evening to make their choices. Explained that since this was a medical form we would like to review this and finalize this with them, agreeing to call him in am tomorrow to do so. Also informed him of concern brought up by nursing this am that pt is not swallowing pills, but rather pocketing them. Explained that poor po intake can both be a result of acute illness and also (more likely) an expected outcome of advanced dementia (made sure to emphasize vascular nature as family has been contentious about pt being labelled with Alzheimer's in the past, which son agreed with). Went on to note one of the decisions on the form asked specifically about how to handle such a situation, including choices to allow whatever intake pt chooses naturally or consider tube feeds. Recommended strongly against the latter as it does not contribute to QOL, which son agreed with.     Gave him update on improving creatinine with caveat that he is still a very ill man and if unable to at least eat consistently we may need to (in addition to planned MOLST review in am) to discuss a better plan. Son seemed open to this, but we plan to discuss more tomorrow. MOLST decisions will also be helpful in clarifying if family will continue to be resistant to agreeing with the poor prognosis being presented by all clinicians up until this point, or if they will finally come around to being open to a more comfort focused plan, though if previous conversations have been any indication, am skeptical about the latter.       PAIN: no non-verbal signs of pain  DYSPNEA: no non-verbal signs of dyspnea      ROS:    unable to gather 2/2 to end stage dementia      PHYSICAL EXAM:    Vital Signs Last 24 Hrs  T(C): 37.2 (22 Jan 2018 09:04), Max: 37.9 (21 Jan 2018 17:45)  T(F): 98.9 (22 Jan 2018 09:04), Max: 100.3 (21 Jan 2018 17:45)  HR: 76 (22 Jan 2018 09:00) (67 - 92)  BP: 113/64 (22 Jan 2018 09:00) (109/61 - 144/67)  BP(mean): 75 (22 Jan 2018 09:00) (68 - 98)  RR: 18 (22 Jan 2018 09:00) (17 - 23)  SpO2: 100% (22 Jan 2018 09:00) (97% - 100%)  Daily     Daily     PPSV2: 20  %  FAST:7c    General: Elderly male lying in bed, attends, awake, does not respond verbally or follow commands, but allows exam with minimal agitation  Mental Status: unable to gather  HEENT: mmm, perrl , eomi, temporal wasting  Lungs: dec at bases  Cardiac: +s1 s2 rrr  GI: soft nt nd +bs  : loredo in place  Ext: no edema  Neuro: limited by mental status, does not follow commands, responds to touch, moves all extremities spontaneously    LABS:                        9.1    12.0  )-----------( 136      ( 22 Jan 2018 06:25 )             28.6     01-22    148<H>  |  114<H>  |  49<H>  ----------------------------<  175<H>  3.8   |  24  |  2.58<H>    Ca    8.5      22 Jan 2018 06:25  Phos  3.3     01-22    TPro  x   /  Alb  1.8<L>  /  TBili  x   /  DBili  x   /  AST  x   /  ALT  x   /  AlkPhos  x   01-22      Albumin: Albumin, Serum: 1.8 g/dL (01-22 @ 06:25)      Allergies    No Known Allergies    Intolerances      MEDICATIONS  (STANDING):  aspirin enteric coated 81 milliGRAM(s) Oral daily  dextrose 5%. 1000 milliLiter(s) (100 mL/Hr) IV Continuous <Continuous>  dextrose 50% Injectable 12.5 Gram(s) IV Push once  dextrose 50% Injectable 25 Gram(s) IV Push once  dextrose 50% Injectable 25 Gram(s) IV Push once  finasteride 5 milliGRAM(s) Oral daily  fluconAZOLE   Tablet 100 milliGRAM(s) Oral daily  heparin  Injectable 5000 Unit(s) SubCutaneous every 8 hours  insulin lispro (HumaLOG) corrective regimen sliding scale   SubCutaneous three times a day before meals  meropenem IVPB 500 milliGRAM(s) IV Intermittent every 12 hours  tamsulosin 0.4 milliGRAM(s) Oral at bedtime    MEDICATIONS  (PRN):  acetaminophen  Suppository 650 milliGRAM(s) Rectal every 6 hours PRN For Temp greater than 38.5 C (101.3 F)  dextrose Gel 1 Dose(s) Oral once PRN Blood Glucose LESS THAN 70 milliGRAM(s)/deciliter  glucagon  Injectable 1 milliGRAM(s) IntraMuscular once PRN Glucose LESS THAN 70 milligrams/deciliter      RADIOLOGY:
Patient is a 82y Male who had no reported events overnight. In TCU bed. Family at bedside    REVIEW OF SYSTEMS:    UTO dementia    MEDICATIONS  (STANDING):  aspirin enteric coated 81 milliGRAM(s) Oral daily  dextrose 5%. 1000 milliLiter(s) (100 mL/Hr) IV Continuous <Continuous>  finasteride 5 milliGRAM(s) Oral daily  heparin  Injectable 5000 Unit(s) SubCutaneous every 8 hours  piperacillin/tazobactam IVPB. 3.375 Gram(s) IV Intermittent every 12 hours  tamsulosin 0.4 milliGRAM(s) Oral at bedtime    MEDICATIONS  (PRN):        T(C): , Max: 37.2 (18 @ 23:25)  T(F): , Max: 98.9 (18 @ 23:25)  HR: 73 (18 @ 11:13)  BP: 142/78 (18 @ 11:13)  BP(mean): --  RR: 16 (18 @ 11:13)  SpO2: 100% (18 @ 11:13)  Wt(kg): --     @ 07:01  -   @ 07:00  --------------------------------------------------------  IN: 1000 mL / OUT: 1150 mL / NET: -150 mL          PHYSICAL EXAM:    Constitutional:frail, dry mmm  HEENT: PERRLA, EOMI,  MMM  Neck: No LAD, No JVD  Respiratory: good aeration  Cardiovascular: S1 and S2, RRR  Gastrointestinal: BS+, soft, NT/ND  Extremities: No peripheral edema  Neurological: Alert  : Chance  Skin: No rashes  Access: Not applicable        LABS:                        8.7    11.2  )-----------( 169      ( 2018 11:37 )             27.9     2018 11:37    150    |  120    |  72     ----------------------------<  214    4.1     |  23     |  3.41   2018 06:07    157    |  126    |  86     ----------------------------<  239    4.5     |  23     |  3.82   15 Yonny 2018 22:13    160    |  129    |  89     ----------------------------<  170    5.0     |  23     |  4.08   15 Yonny 2018 17:21    159    |  124    |  95     ----------------------------<  177    5.0     |  24     |  4.72     Ca    8.6        2018 11:37  Ca    8.7        2018 06:07  Ca    8.9        15 Yonny 2018 22:13  Ca    9.3        15 Yonny 2018 17:21  Phos  3.6       2018 06:07  Mg     2.4       2018 06:07  Mg     2.4       15 Yonny 2018 22:13  Mg     2.7       15 Yonny 2018 17:21    TPro  6.9    /  Alb  1.8    /  TBili  0.3    /  DBili  x      /  AST  6      /  ALT  20     /  AlkPhos  160    2018 06:07  TPro  8.3    /  Alb  2.2    /  TBili  0.4    /  DBili  x      /  AST  12     /  ALT  28     /  AlkPhos  195    15 Yonny 2018 17:21          Urine Studies:  Urinalysis Basic - ( 15 Yonny 2018 17:21 )    Color: Yellow / Appearance: very cloudy / S.025 / pH: x  Gluc: x / Ketone: Trace  / Bili: Negative / Urobili: Negative mg/dL   Blood: x / Protein: 100 mg/dL / Nitrite: Negative   Leuk Esterase: Moderate / RBC: 25-50 /HPF / WBC >50   Sq Epi: x / Non Sq Epi: Occasional / Bacteria: Moderate            RADIOLOGY & ADDITIONAL STUDIES:
Patient is a 82y Male who is in PACU, pcn placed. Draining as per RN. Patient tachcardic here, anesthesia at bedside. VM in place. Agitated, aid at bedside.     REVIEW OF SYSTEMS:    UTO secondary to dementia    MEDICATIONS  (STANDING):  aspirin enteric coated 81 milliGRAM(s) Oral daily  dextrose 5%. 1000 milliLiter(s) (50 mL/Hr) IV Continuous <Continuous>  dextrose 50% Injectable 12.5 Gram(s) IV Push once  dextrose 50% Injectable 25 Gram(s) IV Push once  dextrose 50% Injectable 25 Gram(s) IV Push once  finasteride 5 milliGRAM(s) Oral daily  heparin  Injectable 5000 Unit(s) SubCutaneous every 8 hours  insulin lispro (HumaLOG) corrective regimen sliding scale   SubCutaneous three times a day before meals  meropenem IVPB 500 milliGRAM(s) IV Intermittent every 12 hours  tamsulosin 0.4 milliGRAM(s) Oral at bedtime    MEDICATIONS  (PRN):  dextrose Gel 1 Dose(s) Oral once PRN Blood Glucose LESS THAN 70 milliGRAM(s)/deciliter  fentaNYL    Injectable 25 MICROGram(s) IV Push every 5 minutes PRN Moderate Pain  glucagon  Injectable 1 milliGRAM(s) IntraMuscular once PRN Glucose LESS THAN 70 milligrams/deciliter  ondansetron Injectable 4 milliGRAM(s) IV Push once PRN Nausea and/or Vomiting        T(C): , Max: 38.2 (01-19-18 @ 16:13)  T(F): , Max: 100.7 (01-19-18 @ 16:13)  HR: 127 (01-19-18 @ 16:30)  BP: 159/68 (01-19-18 @ 16:30)  BP(mean): 80 (01-19-18 @ 13:00)  RR: 27 (01-19-18 @ 16:30)  SpO2: 100% (01-19-18 @ 16:30)  Wt(kg): --    01-18 @ 07:01  -  01-19 @ 07:00  --------------------------------------------------------  IN: 500 mL / OUT: 0 mL / NET: 500 mL    01-19 @ 07:01  -  01-19 @ 16:45  --------------------------------------------------------  IN: 250 mL / OUT: 40 mL / NET: 210 mL    PHYSICAL EXAM:    Constitutional: frail  HEENT: PERRLA, EOMI,  MMM  Neck: No LAD, No JVD  Respiratory: scatt ronchi  Cardiovascular: S1 and S2, RRR  Gastrointestinal: BS+, soft, NT/ND  Extremities: No peripheral edema  Neurological: Awake, does not follow command. Agitated  : Robson, Nephrostomy dsg  Skin: No rashes  Access: Not applicable        LABS:    19 Jan 2018 06:32    149    |  118    |  72     ----------------------------<  135    5.1     |  21     |  4.39   18 Jan 2018 06:16    148    |  116    |  74     ----------------------------<  213    4.5     |  21     |  4.01   17 Jan 2018 11:37    150    |  120    |  72     ----------------------------<  214    4.1     |  23     |  3.41   16 Jan 2018 06:07    157    |  126    |  86     ----------------------------<  239    4.5     |  23     |  3.82   15 Yonny 2018 22:13    160    |  129    |  89     ----------------------------<  170    5.0     |  23     |  4.08     Ca    8.5        19 Jan 2018 06:32  Ca    8.4        18 Jan 2018 06:16  Ca    8.6        17 Jan 2018 11:37  Ca    8.7        16 Jan 2018 06:07  Ca    8.9        15 Yonny 2018 22:13  Phos  5.4       19 Jan 2018 06:32  Phos  3.1       18 Jan 2018 06:16  Phos  3.6       16 Jan 2018 06:07  Mg     2.4       16 Jan 2018 06:07  Mg     2.4       15 Yonny 2018 22:13  Mg     2.7       15 Yonny 2018 17:21    TPro  x      /  Alb  1.7    /  TBili  x      /  DBili  x      /  AST  x      /  ALT  x      /  AlkPhos  x      19 Jan 2018 06:32  TPro  x      /  Alb  1.8    /  TBili  x      /  DBili  x      /  AST  x      /  ALT  x      /  AlkPhos  x      18 Jan 2018 06:16  TPro  6.9    /  Alb  1.8    /  TBili  0.3    /  DBili  x      /  AST  6      /  ALT  20     /  AlkPhos  160    16 Jan 2018 06:07  TPro  8.3    /  Alb  2.2    /  TBili  0.4    /  DBili  x      /  AST  12     /  ALT  28     /  AlkPhos  195    15 Yonny 2018 17:21          Urine Studies:          RADIOLOGY & ADDITIONAL STUDIES:
Patient is a 82y Male who still takes no PO. Palliative meeting noted.     REVIEW OF SYSTEMS:    UTO dementia    MEDICATIONS  (STANDING):  aspirin enteric coated 81 milliGRAM(s) Oral daily  finasteride 5 milliGRAM(s) Oral daily  fluconAZOLE IVPB      fluconAZOLE IVPB 100 milliGRAM(s) IV Intermittent every 24 hours  heparin  Injectable 5000 Unit(s) SubCutaneous every 8 hours  meropenem IVPB 500 milliGRAM(s) IV Intermittent every 12 hours  tamsulosin 0.4 milliGRAM(s) Oral at bedtime    MEDICATIONS  (PRN):  acetaminophen  Suppository 650 milliGRAM(s) Rectal every 6 hours PRN For Temp greater than 38.5 C (101.3 F)        T(C): , Max: 37.8 (01-23-18 @ 17:00)  T(F): , Max: 100.1 (01-23-18 @ 17:00)  HR: 78 (01-24-18 @ 11:00)  BP: 96/61 (01-24-18 @ 11:00)  BP(mean): 69 (01-24-18 @ 11:00)  RR: 21 (01-24-18 @ 11:00)  SpO2: 99% (01-24-18 @ 11:00)  Wt(kg): --    01-23 @ 07:01 - 01-24 @ 07:00  --------------------------------------------------------  IN: 400 mL / OUT: 1435 mL / NET: -1035 mL    01-24 @ 07:01 - 01-24 @ 12:26  --------------------------------------------------------  IN: 50 mL / OUT: 0 mL / NET: 50 mL          PHYSICAL EXAM:    Constitutional: frail, cachectic  Neck: No LAD, No JVD  Respiratory: good aeration  Cardiovascular: S1 and S2, RRR  Gastrointestinal: soft  Extremities: No peripheral edema  Neurological: Alert  : pcn  Skin: No rashes  Access: Not applicable        LABS:                        8.1    10.6  )-----------( 133      ( 23 Jan 2018 04:44 )             25.7     24 Jan 2018 05:59    144    |  111    |  37     ----------------------------<  110    3.7     |  24     |  1.87   23 Jan 2018 04:44    143    |  109    |  40     ----------------------------<  164    3.9     |  26     |  2.08   22 Jan 2018 06:25    148    |  114    |  49     ----------------------------<  175    3.8     |  24     |  2.58   21 Jan 2018 06:18    152    |  120    |  60     ----------------------------<  183    4.5     |  25     |  3.45     Ca    8.0        24 Jan 2018 05:59  Ca    8.1        23 Jan 2018 04:44  Ca    8.5        22 Jan 2018 06:25  Ca    8.4        21 Jan 2018 06:18  Phos  3.0       23 Jan 2018 04:44  Phos  3.3       22 Jan 2018 06:25  Phos  4.0       21 Jan 2018 06:18  Mg     1.6       23 Jan 2018 04:44    TPro  x      /  Alb  1.8    /  TBili  x      /  DBili  x      /  AST  x      /  ALT  x      /  AlkPhos  x      22 Jan 2018 06:25  TPro  x      /  Alb  1.8    /  TBili  x      /  DBili  x      /  AST  x      /  ALT  x      /  AlkPhos  x      21 Jan 2018 06:18          Urine Studies:          RADIOLOGY & ADDITIONAL STUDIES:
CC:  Patient is a 82y old  Male who presents with a chief complaint of Sent from [facility]  for abnormal labs (15 Yonny 2018 20:55)    SUBJECTIVE:  confused.    ROS:  unchanged.    aspirin enteric coated 81 milliGRAM(s) Oral daily  dextrose 5%. 1000 milliLiter(s) IV Continuous <Continuous>  dextrose 50% Injectable 12.5 Gram(s) IV Push once  dextrose 50% Injectable 25 Gram(s) IV Push once  dextrose 50% Injectable 25 Gram(s) IV Push once  dextrose Gel 1 Dose(s) Oral once PRN  finasteride 5 milliGRAM(s) Oral daily  glucagon  Injectable 1 milliGRAM(s) IntraMuscular once PRN  heparin  Injectable 5000 Unit(s) SubCutaneous every 8 hours  insulin lispro (HumaLOG) corrective regimen sliding scale   SubCutaneous three times a day before meals  meropenem IVPB 500 milliGRAM(s) IV Intermittent every 12 hours  tamsulosin 0.4 milliGRAM(s) Oral at bedtime    T(C): 37.5 (01-19-18 @ 09:01), Max: 37.5 (01-19-18 @ 09:01)  HR: 80 (01-19-18 @ 08:00) (66 - 87)  BP: 100/52 (01-19-18 @ 08:00) (90/51 - 128/71)  RR: 23 (01-19-18 @ 08:00) (15 - 23)  SpO2: 99% (01-19-18 @ 08:00) (96% - 100%)    GENERAL: comfortable   HEAD:  Atraumatic, Normocephalic  EYES: EOMI, conjunctiva and sclera clear  HEENT: dry mucous membranes  NECK: Supple, No JVD  NERVOUS SYSTEM: Hard to comment,  opens eys  CHEST/LUNG: Clear to auscultation bilaterally; No rales, rhonchi, wheezing, or rubs  HEART:S1S2 normal, no murmer  ABDOMEN: Soft, Nontender, Nondistended; Bowel sounds present  GENITOURINARY- no palpable bladder  EXTREMITIES:  2+ Peripheral Pulses, No clubbing, cyanosis, or edema  MUSCULOSKELTAL- No muscle tenderness, No joint tenderness, no Joint swelling,   SKIN-no rash, no lesion  LYMPH Node- No palpable lymph node                        8.7    11.2  )-----------( 169      ( 17 Jan 2018 11:37 )             27.9     01-19    149<H>  |  118<H>  |  72<H>  ----------------------------<  135<H>  5.1   |  21<L>  |  4.39<H>    Ca    8.5      19 Jan 2018 06:32  Phos  5.4     01-19    TPro  x   /  Alb  1.7<L>  /  TBili  x   /  DBili  x   /  AST  x   /  ALT  x   /  AlkPhos  x   01-19
HPI: Pt seen and examined this am in follow up for sx and GOC, 1:1 at bedside. Pt remains non-verbal, does not follow commands, but allows exam and seems calm/comfortable. As per nursing, pt continues to pocket/food meds, causing team to have to switch over to IV versions for safety.     Called son Bob as planned, who confirmed he and his sister completed MOLST form. He inquired about how pt was doing and if he was eating, shared above as we did yesterday. He decided that it would be best if he come in with his sister to review MOLST and overall plan in person today, agreed with this. He notes he will be in at around 12:30, asked him to have staff call our team when they arrive.       PAIN: no non-verbal signs of pain  DYSPNEA: no non-verbal signs of dyspnea      ROS:  unable to gather 2/2 to end stage dementia      PHYSICAL EXAM:    Vital Signs Last 24 Hrs  T(C): 36.1 (2018 06:03), Max: 36.8 (2018 13:56)  T(F): 97 (2018 06:03), Max: 98.2 (2018 13:56)  HR: 62 (2018 09:00) (60 - 83)  BP: 96/52 (2018 09:00) (92/54 - 138/63)  BP(mean): 62 (2018 09:00) (62 - 86)  RR: 21 (2018 09:00) (16 - 23)  SpO2: 100% (2018 09:00) (98% - 100%)  Daily     Daily Weight in k.3 (2018 06:03)    PPSV2: 20  %  FAST:7c    General: Elderly male lying in bed, attends, awake, does not respond verbally or follow commands, but allows exam with minimal agitation  Mental Status: unable to gather  HEENT: mmm, perrl , eomi, temporal wasting  Lungs: dec at bases  Cardiac: +s1 s2 rrr  GI: soft nt nd +bs  : loredo in place  Ext: no edema  Neuro: limited by mental status, does not follow commands, responds to touch, moves all extremities spontaneously    LABS:                        8.1    10.6  )-----------( 133      ( 2018 04:44 )             25.7     01-23    143  |  109<H>  |  40<H>  ----------------------------<  164<H>  3.9   |  26  |  2.08<H>    Ca    8.1<L>      2018 04:44  Phos  3.0       Mg     1.6         TPro  x   /  Alb  1.8<L>  /  TBili  x   /  DBili  x   /  AST  x   /  ALT  x   /  AlkPhos  x         Albumin: Albumin, Serum: 1.8 g/dL ( @ 06:25)      Allergies    No Known Allergies    Intolerances      MEDICATIONS  (STANDING):  aspirin enteric coated 81 milliGRAM(s) Oral daily  dextrose 5%. 1000 milliLiter(s) (100 mL/Hr) IV Continuous <Continuous>  dextrose 50% Injectable 12.5 Gram(s) IV Push once  dextrose 50% Injectable 25 Gram(s) IV Push once  dextrose 50% Injectable 25 Gram(s) IV Push once  finasteride 5 milliGRAM(s) Oral daily  fluconAZOLE IVPB      heparin  Injectable 5000 Unit(s) SubCutaneous every 8 hours  insulin lispro (HumaLOG) corrective regimen sliding scale   SubCutaneous three times a day before meals  meropenem IVPB 500 milliGRAM(s) IV Intermittent every 12 hours  tamsulosin 0.4 milliGRAM(s) Oral at bedtime    MEDICATIONS  (PRN):  acetaminophen  Suppository 650 milliGRAM(s) Rectal every 6 hours PRN For Temp greater than 38.5 C (101.3 F)  dextrose Gel 1 Dose(s) Oral once PRN Blood Glucose LESS THAN 70 milliGRAM(s)/deciliter  glucagon  Injectable 1 milliGRAM(s) IntraMuscular once PRN Glucose LESS THAN 70 milligrams/deciliter      RADIOLOGY:

## 2018-01-24 NOTE — PROGRESS NOTE ADULT - PROVIDER SPECIALTY LIST ADULT
Cardiology
Hospitalist
Infectious Disease
Nephrology
Palliative Care
Urology
Hospitalist
Palliative Care
Hospitalist
Infectious Disease
Infectious Disease
Nephrology
Nephrology

## 2018-01-24 NOTE — PROGRESS NOTE ADULT - ASSESSMENT
82 with a history of CKD, CVA, dementia, HTN here with hypernatremia and PRABHU, renal evaluation. PRABHU from dehydration    PRABHU  -Renal function stabilized, family per palliative note again affirms no plan ever for HD  -Monitor renal function as needed, stable now with PCN  -PCN plan per     Hypernatremia-Was stable  -optimize oral intake  -GOC, feeding per family and palliative/medical teams    Lytes  -replete as needed    d/c with RN staff

## 2018-01-24 NOTE — PROGRESS NOTE ADULT - ASSESSMENT
82y old Male coming from [facility] with hx of dementia with behavioral disturbance (FAST7c), depression, HTN, BPH w/ loredo, recent admission for ARF now admitted 1/15 for abnormal labs (elevated cr) at SNF. Found to have PRABHU  due to obstructive uropathy, UTI, bacteremia (Proteus), CTc/a/p showing RLL infiltrate and gas in scrotum concerning for abscess with severe bl hydro. Now s/p urology eval of abscess and cysto showing proper placement of loredo with irrigation and no need for incision for drainage. Palliative medicine consulted for assistance with goals of care, pt known to team from last admission.     1)AMS  -Related to underlying dementia with behavioral disturbance complicated by environmental change, uremia, infection  -on 1:1 for safety  -Currently on enhanced supervision for safety  -Fall precautions  - also with poor po intake, not tolerating po meds, S&S assessment appreciated  - prefer conservative measures for combativeness, if needed can consider haldol 0.5 mg IV prn  -avoid anticholinergic meds as reasonable as can exacerbate delirium    2)PRABHU/severe hydro  - and renal notes appreciated  -Imaging reviewed  -Obstructive uropathy and bladder wall thickening  -Pt s/p loredo interrogation, cysto, and perc neph tube placement via IR  -On IVFs  -Per renal pt poor candidate for HD due to behavioral disturbance  - Cr stabilizing s/p neph tubes    3) Bacteremia  -d/w ID, course complete today and no plan for further IV abx upon dc    4)Dementia/Debility  -Chart indicates hx of behavioral disturbance  -Appears to be FAST 7c, due to nonambulatory status  - Fall and aspiration precaution  - Currently on enhanced supervision due to delirium  - inability to maintain adequate intake, nutrition notes appreciated, albumin 1.7 and severe protein calorie malnutrition    5) Prognosis  - poor  - in light of end stage dementia with expected complications of recurrent UTI, loredo, bacteremia and now worsening Prabhu, PPS<40%, and inability to maintain adequate nutrition pt fits criteria for hospice    6)GOC/Advance Directives  -Pt does not have capacity to make medical decisions due to dementia  -No HCP on chart: surrogates are children 1) son Bob [phone] and Amanda Basurto  -Pt has no limits set on aggressive interventions at this time thus remain full code  - MOLST in chart- DNR/DNI, Limited Medical, Send to hospital, No feeding tube, trial of IV fluids, Determine use or limitation of antibiotics, No Dialysis, No Pressors, Ok with transfusions  -GOC meeting held 1/21 and readdressed on 1/23- family set some limits on care, understand end stage disease, not quite ready for comfort focus yet, but coming around. In interim, want LTC placement and deciding with floor SW on if this will take place at [facility]. D/w team. See GOC note for further details.    *Given sx are controlled and GOC are clear, will sign off**  Thank you for including us in Mr. Sauer's care.     Cruz Hendrickson MD  Palliative Care Attending

## 2018-01-24 NOTE — PROGRESS NOTE ADULT - ASSESSMENT
82yo M w/PMHx of HTN, Dementia, Major Depressive  Disorder, BPH with indwelling loredo -BIBA from [facility] for abnormal labs . Per paperwork: baseline dementia, disoriented and non ambulatory at baseline . Labs reviewed from paperwork: January 8th 2018 Cr - 2.8 January 12th 2018 Cr - 3.24 and 1/15 Cr: 4.38; Unable to obtain history from patient due to dementia; admitted dec 2017 for acute renal failure from obstructive uropathy and dehydration and proteus sepsis/UTI with blood cx positive for proteus -completed course of ceftin in rehab, in ER, afebrile, normal wbc ct, UA grossly positive, elevated LA 4.5, loredo was replaced in ER with 1000 cc or urine output and 650 cc this am 1/16, had traumatic loredo placement with some bloody discharge around penis, CT abd/pelvis/chest showed posterior RLL infiltrates ? pna and some gas in scrotum which could be from loredo placement along with thickening in bladder wall, was given IV vanco/zosyn/rocephin.     1. E faecalis sepsis/ESBL Ecoli UTI/RLL PNA/obstructive uropathy/avel/BPH with chronic indwelling loredo/scrotal/penile abscess  - remains lethargic, ill-appearing  - urology eval appreciated, s/p B/L nephrostomy placement 1/19  - OR cx from scrotal abscess growing ESBL ecoli/E faecalis, morganella morganii,   - blood cx growing e faecalis source - d/t above  source  - urine cx growing ESBL ecoli UTI and candida albicans  - completed 2 days of zosyn  - on meropenem [id number] #7  - on diflucan 100mg daily #3  - continue with antibiotic coverage   - plan for 7 days of meropenem total  - upon d/c, stop diflucan   - repeat blood cx 1/17 no growth  - TTE wnl  - monitor temps  - tolerating abx well so far; no side effects noted.  - reason for abx use and side effects reviewed with patient  - f/u cbc  - supportive care  - palliative care following  - poor prognosis    2. other issues - care per medicine

## 2018-01-24 NOTE — PROGRESS NOTE ADULT - ASSESSMENT
83M.  admitted 01/15/18.  presents from [facility] to ED due to abnormal lab findings.    PMHx:  HTN;  BPH;  dementia;  major depressive disorder.    PRABHU secondary to obstructive uropathy (BPH) + hypovolemia/hypernatremia.  -PO intake deteriorating.  foresee the need to resume IVFs in the near future if PO intake remains poor.  -Na and Cr improving.  -CT AB/pelvis, severe b/l HN.  -01/19 b/l PCN placed.  -continue to trend BMP.  -Nephrology following.    bacteremia.  perineal abscess.   -01/18 incision and drainage of abscess of perineum.  -01/17 BCx, no growth.  -01/15 BCx (+) E. faecalis.  -01/16 TTE, no vegetation.  -UCx 100K ESBL E. coli + candida albicans.  -c/w meropenem + fluconazole.  -ID following.    urethral perforation.    -01/20 Chance catheter removed and nephrostomy tubes placed.  -Urology following.    normocytic anemia.  -HH stable.    -continue to trend CBC.  -monitor hematuria from right PCN.    dysphagia.  -patient currently pouching his modified diet.  -speech pathology input noted, c/w DYSPHAGIA 1 DIET WITH NECTAR THICK CONSISTENCIES.    DVT prophylaxis.  -UFH sq q8h.    disposition.  -may transfer to the general medical-surgical [facility].  -overall prognosis is very poor.  -DNR/DNI.  -Palliative Medicine input appreciated, would be appropriate for hospice once family agreeable.    communication.  -d/w RN.

## 2018-01-25 ENCOUNTER — TRANSCRIPTION ENCOUNTER (OUTPATIENT)
Age: 83
End: 2018-01-25

## 2018-01-25 VITALS
RESPIRATION RATE: 17 BRPM | OXYGEN SATURATION: 100 % | SYSTOLIC BLOOD PRESSURE: 127 MMHG | DIASTOLIC BLOOD PRESSURE: 68 MMHG | HEART RATE: 83 BPM

## 2018-01-25 LAB
-  AMIKACIN: SIGNIFICANT CHANGE UP
-  AMPICILLIN/SULBACTAM: SIGNIFICANT CHANGE UP
-  AMPICILLIN: SIGNIFICANT CHANGE UP
-  AZTREONAM: SIGNIFICANT CHANGE UP
-  CEFAZOLIN: SIGNIFICANT CHANGE UP
-  CEFEPIME: SIGNIFICANT CHANGE UP
-  CEFOXITIN: SIGNIFICANT CHANGE UP
-  CEFTAZIDIME: SIGNIFICANT CHANGE UP
-  CEFTRIAXONE: SIGNIFICANT CHANGE UP
-  CIPROFLOXACIN: SIGNIFICANT CHANGE UP
-  ERTAPENEM: SIGNIFICANT CHANGE UP
-  GENTAMICIN: SIGNIFICANT CHANGE UP
-  LEVOFLOXACIN: SIGNIFICANT CHANGE UP
-  MEROPENEM: SIGNIFICANT CHANGE UP
-  PIPERACILLIN/TAZOBACTAM: SIGNIFICANT CHANGE UP
-  TOBRAMYCIN: SIGNIFICANT CHANGE UP
-  TRIMETHOPRIM/SULFAMETHOXAZOLE: SIGNIFICANT CHANGE UP
CULTURE RESULTS: SIGNIFICANT CHANGE UP
METHOD TYPE: SIGNIFICANT CHANGE UP
ORGANISM # SPEC MICROSCOPIC CNT: SIGNIFICANT CHANGE UP
SPECIMEN SOURCE: SIGNIFICANT CHANGE UP

## 2018-01-25 RX ORDER — ACETAMINOPHEN 500 MG
1 TABLET ORAL
Qty: 0 | Refills: 0 | COMMUNITY
Start: 2018-01-25

## 2018-01-25 RX ORDER — ASPIRIN/CALCIUM CARB/MAGNESIUM 324 MG
1 TABLET ORAL
Qty: 0 | Refills: 0 | COMMUNITY
Start: 2018-01-25

## 2018-01-25 RX ORDER — FINASTERIDE 5 MG/1
1 TABLET, FILM COATED ORAL
Qty: 0 | Refills: 0 | COMMUNITY
Start: 2018-01-25

## 2018-01-25 RX ORDER — TAMSULOSIN HYDROCHLORIDE 0.4 MG/1
0 CAPSULE ORAL
Qty: 60 | Refills: 0 | COMMUNITY

## 2018-01-25 RX ORDER — TAMSULOSIN HYDROCHLORIDE 0.4 MG/1
1 CAPSULE ORAL
Qty: 0 | Refills: 0 | COMMUNITY
Start: 2018-01-25

## 2018-01-25 RX ADMIN — FLUCONAZOLE 50 MILLIGRAM(S): 150 TABLET ORAL at 10:35

## 2018-01-25 RX ADMIN — HEPARIN SODIUM 5000 UNIT(S): 5000 INJECTION INTRAVENOUS; SUBCUTANEOUS at 05:02

## 2018-01-25 RX ADMIN — HEPARIN SODIUM 5000 UNIT(S): 5000 INJECTION INTRAVENOUS; SUBCUTANEOUS at 15:08

## 2018-01-25 NOTE — DISCHARGE NOTE ADULT - MEDICATION SUMMARY - MEDICATIONS TO STOP TAKING
I will STOP taking the medications listed below when I get home from the hospital:    cefuroxime 250 mg oral tablet  -- 1 tab(s) by mouth every 12 hours

## 2018-01-25 NOTE — DISCHARGE NOTE ADULT - CARE PLAN
Principal Discharge DX:	Bladder infection  Goal:	see below.  Assessment and plan of treatment:	nephrostomy tube care as directed by Urology, Dr. Smith.

## 2018-01-25 NOTE — DISCHARGE NOTE ADULT - PATIENT PORTAL LINK FT
“You can access the [employer] Patient Portal, offered by [facility], by registering with the following website: [email or web address]”

## 2018-01-25 NOTE — DISCHARGE NOTE ADULT - MEDICATION SUMMARY - MEDICATIONS TO TAKE
I will START or STAY ON the medications listed below when I get home from the hospital:    finasteride 5 mg oral tablet  -- 1 tab(s) by mouth once a day  -- Indication: For Urinary retention    acetaminophen 650 mg rectal suppository  -- 1 suppository(ies) rectally every 6 hours, As needed, For Temp greater than 38.5 C (101.3 F)  -- Indication: For as needed for fever    aspirin 81 mg oral delayed release tablet  -- 1 tab(s) by mouth once a day  -- Indication: For cardiovascular prophylaxis    tamsulosin 0.4 mg oral capsule  -- 1 cap(s) by mouth once a day (at bedtime)  -- Indication: For Urinary retention

## 2018-01-25 NOTE — DISCHARGE NOTE ADULT - HOSPITAL COURSE
CC:  Patient is a 82y old  Male who presents with a chief complaint of Sent from [facility]  for abnormal labs (15 Yonny 2018 20:55)    SUBJECTIVE:  arrousale, but nonverbal, confused and disoriented.    ROS:  unobtainable.    Vital Signs Last 24 Hrs  T(C): 36.7 (25 Jan 2018 08:15), Max: 37.1 (24 Jan 2018 11:57)  T(F): 98.1 (25 Jan 2018 08:15), Max: 98.8 (24 Jan 2018 11:57)  HR: 80 (25 Jan 2018 10:00) (70 - 85)  BP: 127/67 (25 Jan 2018 10:00) (92/54 - 138/67)  BP(mean): 83 (25 Jan 2018 10:00) (64 - 83)  RR: 21 (25 Jan 2018 10:00) (16 - 23)  SpO2: 99% (25 Jan 2018 10:00) (97% - 100%)    PHYSICAL EXAM:  GENERAL: comfortable   HEAD:  Atraumatic, Normocephalic  EYES: EOMI, conjunctiva and sclera clear  HEENT: dry mucous membranes  NECK: Supple, No JVD  CHEST/LUNG: Clear to auscultation bilaterally; No rales, rhonchi, wheezing, or rubs  HEART:S1S2 normal, no murmer  ABDOMEN: Soft, Nontender, Nondistended; Bowel sounds present  GENITOURINARY: (+) b/l nephrostomy tubes.  clear pale urine draining.  EXTREMITIES:  no edema.      01-24    144  |  111<H>  |  37<H>  ----------------------------<  110<H>  3.7   |  24  |  1.87<H>    Ca    8.0<L>      24 Jan 2018 05:59    83M.  admitted 01/15/18.  presents from [facility] to ED due to abnormal lab findings.    PMHx:  HTN;  BPH;  dementia;  major depressive disorder.    PRABHU secondary to obstructive uropathy (BPH) + hypovolemia/hypernatremia.  -PO intake deteriorating.  foresee the need to resume IVFs in the near future if PO intake remains poor.  -Na and Cr improving.  -CT AB/pelvis, severe b/l HN.  -01/19 b/l PCN placed.  -Nephrology f/u outpatient.  -Urology f/u outpatient.    bacteremia.  perineal abscess.   -01/18 incision and drainage of abscess of perineum.  -01/17 BCx, no growth.  -01/15 BCx (+) E. faecalis.  -01/16 TTE, no vegetation.  -UCx 100K ESBL E. coli + candida albicans.  -d/c ID, will discontinue ABx at current time.     -ID input appreciated.    urethral perforation.    -01/20 Chance catheter removed and nephrostomy tubes placed.  -Urology f/u outpatient.    normocytic anemia.  -HH stable.    -PMD f/u outpatient.    dysphagia.  -patient currently pouching his modified diet.  -speech pathology input noted, c/w DYSPHAGIA 1 DIET WITH NECTAR THICK CONSISTENCIES.    disposition.  -may discharge today to SNF.  -overall prognosis is very poor.  -DNR/DNI.  -Palliative Medicine input appreciated, would be appropriate for hospice once family agreeable.  -d/c > 35 minutes.    communication.  -d/w RN.

## 2018-01-30 DIAGNOSIS — N17.9 ACUTE KIDNEY FAILURE, UNSPECIFIED: ICD-10-CM

## 2018-01-30 DIAGNOSIS — L02.215 CUTANEOUS ABSCESS OF PERINEUM: ICD-10-CM

## 2018-01-30 DIAGNOSIS — E86.0 DEHYDRATION: ICD-10-CM

## 2018-01-30 DIAGNOSIS — B96.20 UNSPECIFIED ESCHERICHIA COLI [E. COLI] AS THE CAUSE OF DISEASES CLASSIFIED ELSEWHERE: ICD-10-CM

## 2018-01-30 DIAGNOSIS — E43 UNSPECIFIED SEVERE PROTEIN-CALORIE MALNUTRITION: ICD-10-CM

## 2018-01-30 DIAGNOSIS — N49.2 INFLAMMATORY DISORDERS OF SCROTUM: ICD-10-CM

## 2018-01-30 DIAGNOSIS — E83.39 OTHER DISORDERS OF PHOSPHORUS METABOLISM: ICD-10-CM

## 2018-01-30 DIAGNOSIS — N13.6 PYONEPHROSIS: ICD-10-CM

## 2018-01-30 DIAGNOSIS — I95.9 HYPOTENSION, UNSPECIFIED: ICD-10-CM

## 2018-01-30 DIAGNOSIS — E87.2 ACIDOSIS: ICD-10-CM

## 2018-01-30 DIAGNOSIS — R78.81 BACTEREMIA: ICD-10-CM

## 2018-01-30 DIAGNOSIS — N13.9 OBSTRUCTIVE AND REFLUX UROPATHY, UNSPECIFIED: ICD-10-CM

## 2018-01-30 DIAGNOSIS — I10 ESSENTIAL (PRIMARY) HYPERTENSION: ICD-10-CM

## 2018-01-30 DIAGNOSIS — E87.0 HYPEROSMOLALITY AND HYPERNATREMIA: ICD-10-CM

## 2018-01-30 NOTE — ED PROVIDER NOTE - CPE EDP GASTRO NORM
After Visit Summary   1/30/2018    Norma Banerjee    MRN: [id number]           Patient Information     Date Of Birth          1/1/1930        Visit Information        Provider Department      1/30/2018 2:15 PM [facility] Foreman [facility]        Today's Diagnoses     Biceps muscle tear, right, initial encounter    -  1    Skin lesion           Follow-ups after your visit        Additional Services     OTOLARYNGOLOGY REFERRAL       Your provider has referred you to: Dr Billings      Please be aware that coverage of these services is subject to the terms and limitations of your health insurance plan.  Call member services at your health plan with any benefit or coverage questions.      Please bring the following with you to your appointment:    (1) Any X-Rays, CTs or MRIs which have been performed.  Contact the facility where they were done to arrange for  prior to your scheduled appointment.   (2) List of current medications  (3) This referral request   (4) Any documents/labs given to you for this referral                  Who to contact     If you have questions or need follow up information about today's clinic visit or your schedule please contact [facility] directly at [phone].  Normal or non-critical lab and imaging results will be communicated to you by MyChart, letter or phone within 4 business days after the clinic has received the results. If you do not hear from us within 7 days, please contact the clinic through MyChart or phone. If you have a critical or abnormal lab result, we will notify you by phone as soon as possible.  Submit refill requests through [employer] or call your pharmacy and they will forward the refill request to us. Please allow 3 business days for your refill to be completed.          Additional Information About Your Visit        MyChart Information     [employer] gives you secure access to your electronic health record. If you  "see a primary care provider, you can also send messages to your care team and make appointments. If you have questions, please call your primary care clinic.  If you do not have a primary care provider, please call [phone] and they will assist you.        Care EveryWhere ID     This is your Care EveryWhere ID. This could be used by other organizations to access your [place] medical records  [id number]        Your Vitals Were     Pulse Respirations Height Pulse Oximetry Breastfeeding?       74 20 5' 2\" (1.575 m) 98% No        Blood Pressure from Last 3 Encounters:   01/30/18 142/68   12/13/17 163/60   11/30/17 124/80    Weight from Last 3 Encounters:   12/13/17 190 lb (86.2 kg)   11/30/17 187 lb (84.8 kg)   11/15/17 185 lb (83.9 kg)              We Performed the Following     OTOLARYNGOLOGY REFERRAL        Primary Care Provider Office Phone # Fax #    Carol Foreman [phone]       [facility] [place] [address]  [place] [address]        Equal Access to Services     [facility]: Hadii aad ku hadasho Soomaali, waaxda luqadaha, qaybta kaalmada adeegyada, nii rivera hayshhaeenn ramon veronica . So [facility] [phone].    ATENCIÓN: Si habla español, tiene a oliva disposición servicios gratuitos de asistencia lingüística. [facility] [phone].    We comply with applicable federal civil rights laws and [place] laws. We do not discriminate on the basis of race, color, national origin, age, disability, sex, sexual orientation, or gender identity.            Thank you!     Thank you for choosing [facility] [place]  for your care. Our goal is always to provide you with excellent care. Hearing back from our patients is one way we can continue to improve our services. Please take a few minutes to complete the written survey that you may receive in the mail after your visit with us. Thank you!             Your Updated Medication List - Protect others around you: Learn how to safely " use, store and throw away your medicines at www.disposemymeds.org.          This list is accurate as of 1/30/18  2:42 PM.  Always use your most recent med list.                   Brand Name Dispense Instructions for use Diagnosis    aspirin 81 MG tablet      Take 1 tablet (81 mg) by mouth daily        TYLENOL PO      Take by mouth At Bedtime           normal...

## 2018-02-01 LAB
-  AMIKACIN: SIGNIFICANT CHANGE UP
-  AMPICILLIN/SULBACTAM: SIGNIFICANT CHANGE UP
-  AMPICILLIN: SIGNIFICANT CHANGE UP
-  AZTREONAM: SIGNIFICANT CHANGE UP
-  CEFAZOLIN: SIGNIFICANT CHANGE UP
-  CEFEPIME: SIGNIFICANT CHANGE UP
-  CEFOXITIN: SIGNIFICANT CHANGE UP
-  CEFTAZIDIME: SIGNIFICANT CHANGE UP
-  CEFTRIAXONE: SIGNIFICANT CHANGE UP
-  CIPROFLOXACIN: SIGNIFICANT CHANGE UP
-  ERTAPENEM: SIGNIFICANT CHANGE UP
-  ERYTHROMYCIN: SIGNIFICANT CHANGE UP
-  GENTAMICIN: SIGNIFICANT CHANGE UP
-  IMIPENEM: SIGNIFICANT CHANGE UP
-  IMIPENEM: SIGNIFICANT CHANGE UP
-  LEVOFLOXACIN: SIGNIFICANT CHANGE UP
-  MEROPENEM: SIGNIFICANT CHANGE UP
-  PIPERACILLIN/TAZOBACTAM: SIGNIFICANT CHANGE UP
-  TETRACYCLINE: SIGNIFICANT CHANGE UP
-  TOBRAMYCIN: SIGNIFICANT CHANGE UP
-  TRIMETHOPRIM/SULFAMETHOXAZOLE: SIGNIFICANT CHANGE UP
-  VANCOMYCIN: SIGNIFICANT CHANGE UP
CULTURE RESULTS: SIGNIFICANT CHANGE UP
METHOD TYPE: SIGNIFICANT CHANGE UP
ORGANISM # SPEC MICROSCOPIC CNT: SIGNIFICANT CHANGE UP
SPECIMEN SOURCE: SIGNIFICANT CHANGE UP

## 2018-02-02 ENCOUNTER — INPATIENT (INPATIENT)
Facility: HOSPITAL | Age: 83
LOS: 0 days | End: 2018-02-03
Attending: INTERNAL MEDICINE | Admitting: INTERNAL MEDICINE
Payer: MEDICARE

## 2018-02-02 VITALS
SYSTOLIC BLOOD PRESSURE: 107 MMHG | HEIGHT: 72 IN | HEART RATE: 93 BPM | OXYGEN SATURATION: 100 % | TEMPERATURE: 99 F | DIASTOLIC BLOOD PRESSURE: 69 MMHG | RESPIRATION RATE: 16 BRPM | WEIGHT: 154.98 LBS
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
## 2018-02-02 DIAGNOSIS — N17.9 ACUTE KIDNEY FAILURE, UNSPECIFIED: ICD-10-CM

## 2018-02-02 DIAGNOSIS — J96.01 ACUTE RESPIRATORY FAILURE WITH HYPOXIA: ICD-10-CM

## 2018-02-02 DIAGNOSIS — A41.50 GRAM-NEGATIVE SEPSIS, UNSPECIFIED: ICD-10-CM

## 2018-02-02 DIAGNOSIS — I10 ESSENTIAL (PRIMARY) HYPERTENSION: ICD-10-CM

## 2018-02-02 DIAGNOSIS — E83.51 HYPOCALCEMIA: ICD-10-CM

## 2018-02-02 DIAGNOSIS — N18.4 CHRONIC KIDNEY DISEASE, STAGE 4 (SEVERE): ICD-10-CM

## 2018-02-02 DIAGNOSIS — Z79.82 LONG TERM (CURRENT) USE OF ASPIRIN: ICD-10-CM

## 2018-02-02 DIAGNOSIS — N13.6 PYONEPHROSIS: ICD-10-CM

## 2018-02-02 DIAGNOSIS — A41.9 SEPSIS, UNSPECIFIED ORGANISM: ICD-10-CM

## 2018-02-02 DIAGNOSIS — N49.2 INFLAMMATORY DISORDERS OF SCROTUM: ICD-10-CM

## 2018-02-02 DIAGNOSIS — R65.21 SEVERE SEPSIS WITH SEPTIC SHOCK: ICD-10-CM

## 2018-02-02 DIAGNOSIS — Z66 DO NOT RESUSCITATE: ICD-10-CM

## 2018-02-02 DIAGNOSIS — G30.9 ALZHEIMER'S DISEASE, UNSPECIFIED: ICD-10-CM

## 2018-02-02 DIAGNOSIS — N39.0 URINARY TRACT INFECTION, SITE NOT SPECIFIED: ICD-10-CM

## 2018-02-02 DIAGNOSIS — T83.022A DISPLACEMENT OF NEPHROSTOMY CATHETER, INITIAL ENCOUNTER: ICD-10-CM

## 2018-02-02 DIAGNOSIS — J96.02 ACUTE RESPIRATORY FAILURE WITH HYPERCAPNIA: ICD-10-CM

## 2018-02-02 DIAGNOSIS — I12.9 HYPERTENSIVE CHRONIC KIDNEY DISEASE WITH STAGE 1 THROUGH STAGE 4 CHRONIC KIDNEY DISEASE, OR UNSPECIFIED CHRONIC KIDNEY DISEASE: ICD-10-CM

## 2018-02-02 DIAGNOSIS — E43 UNSPECIFIED SEVERE PROTEIN-CALORIE MALNUTRITION: ICD-10-CM

## 2018-02-02 DIAGNOSIS — F02.81 DEMENTIA IN OTHER DISEASES CLASSIFIED ELSEWHERE, UNSPECIFIED SEVERITY, WITH BEHAVIORAL DISTURBANCE: ICD-10-CM
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
## 2018-02-02 DIAGNOSIS — F32.9 MAJOR DEPRESSIVE DISORDER, SINGLE EPISODE, UNSPECIFIED: ICD-10-CM

## 2018-02-02 DIAGNOSIS — G92 TOXIC ENCEPHALOPATHY: ICD-10-CM

## 2018-02-02 DIAGNOSIS — F03.91 UNSPECIFIED DEMENTIA WITH BEHAVIORAL DISTURBANCE: ICD-10-CM

## 2018-02-02 DIAGNOSIS — H35.30 UNSPECIFIED MACULAR DEGENERATION: ICD-10-CM

## 2018-02-02 DIAGNOSIS — R31.9 HEMATURIA, UNSPECIFIED: ICD-10-CM

## 2018-02-02 DIAGNOSIS — J96.91 RESPIRATORY FAILURE, UNSPECIFIED WITH HYPOXIA: ICD-10-CM

## 2018-02-02 LAB
ALBUMIN SERPL ELPH-MCNC: 1.4 G/DL — LOW (ref 3.3–5)
ALBUMIN SERPL ELPH-MCNC: 2.3 G/DL — LOW (ref 3.3–5)
ALP SERPL-CCNC: 113 U/L — SIGNIFICANT CHANGE UP (ref 40–120)
ALP SERPL-CCNC: 128 U/L — HIGH (ref 40–120)
ALT FLD-CCNC: 10 U/L — LOW (ref 12–78)
ALT FLD-CCNC: 8 U/L — LOW (ref 12–78)
ANION GAP SERPL CALC-SCNC: 12 MMOL/L — SIGNIFICANT CHANGE UP (ref 5–17)
ANION GAP SERPL CALC-SCNC: 5 MMOL/L — SIGNIFICANT CHANGE UP (ref 5–17)
AST SERPL-CCNC: 12 U/L — LOW (ref 15–37)
AST SERPL-CCNC: 49 U/L — HIGH (ref 15–37)
BILIRUB SERPL-MCNC: 0.6 MG/DL — SIGNIFICANT CHANGE UP (ref 0.2–1.2)
BILIRUB SERPL-MCNC: 1.7 MG/DL — HIGH (ref 0.2–1.2)
BLD GP AB SCN SERPL QL: SIGNIFICANT CHANGE UP
BUN SERPL-MCNC: 49 MG/DL — HIGH (ref 7–23)
BUN SERPL-MCNC: 53 MG/DL — HIGH (ref 7–23)
CALCIUM SERPL-MCNC: 6.7 MG/DL — LOW (ref 8.5–10.1)
CALCIUM SERPL-MCNC: 8.6 MG/DL — SIGNIFICANT CHANGE UP (ref 8.5–10.1)
CHLORIDE SERPL-SCNC: 117 MMOL/L — HIGH (ref 96–108)
CHLORIDE SERPL-SCNC: 124 MMOL/L — HIGH (ref 96–108)
CO2 SERPL-SCNC: 17 MMOL/L — LOW (ref 22–31)
CO2 SERPL-SCNC: 26 MMOL/L — SIGNIFICANT CHANGE UP (ref 22–31)
CREAT SERPL-MCNC: 2.84 MG/DL — HIGH (ref 0.5–1.3)
CREAT SERPL-MCNC: 2.93 MG/DL — HIGH (ref 0.5–1.3)
GLUCOSE SERPL-MCNC: 136 MG/DL — HIGH (ref 70–99)
GLUCOSE SERPL-MCNC: 92 MG/DL — SIGNIFICANT CHANGE UP (ref 70–99)
HCT VFR BLD CALC: 21.5 % — LOW (ref 39–50)
HCT VFR BLD CALC: 26 % — LOW (ref 39–50)
HGB BLD-MCNC: 6.7 G/DL — CRITICAL LOW (ref 13–17)
HGB BLD-MCNC: 8.3 G/DL — LOW (ref 13–17)
LACTATE SERPL-SCNC: 2.5 MMOL/L — HIGH (ref 0.7–2)
LACTATE SERPL-SCNC: 5.7 MMOL/L — CRITICAL HIGH (ref 0.7–2)
MCHC RBC-ENTMCNC: 28.1 PG — SIGNIFICANT CHANGE UP (ref 27–34)
MCHC RBC-ENTMCNC: 28.4 PG — SIGNIFICANT CHANGE UP (ref 27–34)
MCHC RBC-ENTMCNC: 31.3 GM/DL — LOW (ref 32–36)
MCHC RBC-ENTMCNC: 31.9 GM/DL — LOW (ref 32–36)
MCV RBC AUTO: 88.9 FL — SIGNIFICANT CHANGE UP (ref 80–100)
MCV RBC AUTO: 89.9 FL — SIGNIFICANT CHANGE UP (ref 80–100)
PLATELET # BLD AUTO: 117 K/UL — LOW (ref 150–400)
PLATELET # BLD AUTO: 176 K/UL — SIGNIFICANT CHANGE UP (ref 150–400)
POTASSIUM SERPL-MCNC: 4 MMOL/L — SIGNIFICANT CHANGE UP (ref 3.5–5.3)
POTASSIUM SERPL-MCNC: 5.1 MMOL/L — SIGNIFICANT CHANGE UP (ref 3.5–5.3)
POTASSIUM SERPL-SCNC: 4 MMOL/L — SIGNIFICANT CHANGE UP (ref 3.5–5.3)
POTASSIUM SERPL-SCNC: 5.1 MMOL/L — SIGNIFICANT CHANGE UP (ref 3.5–5.3)
PROT SERPL-MCNC: 4.9 GM/DL — LOW (ref 6–8.3)
PROT SERPL-MCNC: 7.6 GM/DL — SIGNIFICANT CHANGE UP (ref 6–8.3)
RBC # BLD: 2.39 M/UL — LOW (ref 4.2–5.8)
RBC # BLD: 2.92 M/UL — LOW (ref 4.2–5.8)
RBC # FLD: 14.8 % — HIGH (ref 10.3–14.5)
RBC # FLD: 15.1 % — HIGH (ref 10.3–14.5)
SODIUM SERPL-SCNC: 148 MMOL/L — HIGH (ref 135–145)
SODIUM SERPL-SCNC: 153 MMOL/L — HIGH (ref 135–145)
TYPE + AB SCN PNL BLD: SIGNIFICANT CHANGE UP
WBC # BLD: 12.5 K/UL — HIGH (ref 3.8–10.5)
WBC # BLD: 2.3 K/UL — LOW (ref 3.8–10.5)
WBC # FLD AUTO: 12.5 K/UL — HIGH (ref 3.8–10.5)
WBC # FLD AUTO: 2.3 K/UL — LOW (ref 3.8–10.5)

## 2018-02-02 PROCEDURE — 99285 EMERGENCY DEPT VISIT HI MDM: CPT

## 2018-02-02 PROCEDURE — 71045 X-RAY EXAM CHEST 1 VIEW: CPT | Mod: 26

## 2018-02-02 PROCEDURE — 50435 EXCHANGE NEPHROSTOMY CATH: CPT | Mod: 50

## 2018-02-02 PROCEDURE — 76770 US EXAM ABDO BACK WALL COMP: CPT | Mod: 26

## 2018-02-02 RX ORDER — ACETAMINOPHEN 500 MG
650 TABLET ORAL EVERY 6 HOURS
Qty: 0 | Refills: 0 | Status: DISCONTINUED | OUTPATIENT
Start: 2018-02-02 | End: 2018-02-03

## 2018-02-02 RX ORDER — ASPIRIN/CALCIUM CARB/MAGNESIUM 324 MG
81 TABLET ORAL DAILY
Qty: 0 | Refills: 0 | Status: DISCONTINUED | OUTPATIENT
Start: 2018-02-02 | End: 2018-02-03

## 2018-02-02 RX ORDER — DEXMEDETOMIDINE HYDROCHLORIDE IN 0.9% SODIUM CHLORIDE 4 UG/ML
0.1 INJECTION INTRAVENOUS
Qty: 200 | Refills: 0 | Status: DISCONTINUED | OUTPATIENT
Start: 2018-02-02 | End: 2018-02-03

## 2018-02-02 RX ORDER — PHENYLEPHRINE HYDROCHLORIDE 10 MG/ML
10 INJECTION INTRAVENOUS
Qty: 80 | Refills: 0 | Status: DISCONTINUED | OUTPATIENT
Start: 2018-02-02 | End: 2018-02-03

## 2018-02-02 RX ORDER — SODIUM CHLORIDE 9 MG/ML
500 INJECTION INTRAMUSCULAR; INTRAVENOUS; SUBCUTANEOUS ONCE
Qty: 0 | Refills: 0 | Status: COMPLETED | OUTPATIENT
Start: 2018-02-02 | End: 2018-02-02

## 2018-02-02 RX ORDER — TAMSULOSIN HYDROCHLORIDE 0.4 MG/1
0.4 CAPSULE ORAL AT BEDTIME
Qty: 0 | Refills: 0 | Status: DISCONTINUED | OUTPATIENT
Start: 2018-02-02 | End: 2018-02-03

## 2018-02-02 RX ORDER — FINASTERIDE 5 MG/1
5 TABLET, FILM COATED ORAL DAILY
Qty: 0 | Refills: 0 | Status: DISCONTINUED | OUTPATIENT
Start: 2018-02-02 | End: 2018-02-03

## 2018-02-02 RX ORDER — FENTANYL CITRATE 50 UG/ML
50 INJECTION INTRAVENOUS
Qty: 0 | Refills: 0 | Status: DISCONTINUED | OUTPATIENT
Start: 2018-02-02 | End: 2018-02-02

## 2018-02-02 RX ORDER — SODIUM CHLORIDE 9 MG/ML
1000 INJECTION INTRAMUSCULAR; INTRAVENOUS; SUBCUTANEOUS
Qty: 0 | Refills: 0 | Status: DISCONTINUED | OUTPATIENT
Start: 2018-02-02 | End: 2018-02-02

## 2018-02-02 RX ORDER — SODIUM CHLORIDE 9 MG/ML
2000 INJECTION INTRAMUSCULAR; INTRAVENOUS; SUBCUTANEOUS ONCE
Qty: 0 | Refills: 0 | Status: COMPLETED | OUTPATIENT
Start: 2018-02-02 | End: 2018-02-02

## 2018-02-02 RX ORDER — SODIUM CHLORIDE 9 MG/ML
1000 INJECTION, SOLUTION INTRAVENOUS
Qty: 0 | Refills: 0 | Status: DISCONTINUED | OUTPATIENT
Start: 2018-02-02 | End: 2018-02-03

## 2018-02-02 RX ORDER — NOREPINEPHRINE BITARTRATE/D5W 8 MG/250ML
0.1 PLASTIC BAG, INJECTION (ML) INTRAVENOUS
Qty: 8 | Refills: 0 | Status: DISCONTINUED | OUTPATIENT
Start: 2018-02-02 | End: 2018-02-03

## 2018-02-02 RX ORDER — CEFEPIME 1 G/1
1000 INJECTION, POWDER, FOR SOLUTION INTRAMUSCULAR; INTRAVENOUS ONCE
Qty: 0 | Refills: 0 | Status: COMPLETED | OUTPATIENT
Start: 2018-02-02 | End: 2018-02-02

## 2018-02-02 RX ORDER — SODIUM CHLORIDE 9 MG/ML
1000 INJECTION INTRAMUSCULAR; INTRAVENOUS; SUBCUTANEOUS ONCE
Qty: 0 | Refills: 0 | Status: COMPLETED | OUTPATIENT
Start: 2018-02-02 | End: 2018-02-02

## 2018-02-02 RX ADMIN — PHENYLEPHRINE HYDROCHLORIDE 263.62 MICROGRAM(S)/KG/MIN: 10 INJECTION INTRAVENOUS at 19:45

## 2018-02-02 RX ADMIN — Medication 13.18 MICROGRAM(S)/KG/MIN: at 20:06

## 2018-02-02 RX ADMIN — PHENYLEPHRINE HYDROCHLORIDE 263.62 MICROGRAM(S)/KG/MIN: 10 INJECTION INTRAVENOUS at 22:29

## 2018-02-02 RX ADMIN — SODIUM CHLORIDE 100 MILLILITER(S): 9 INJECTION INTRAMUSCULAR; INTRAVENOUS; SUBCUTANEOUS at 20:06

## 2018-02-02 RX ADMIN — CEFEPIME 100 MILLIGRAM(S): 1 INJECTION, POWDER, FOR SOLUTION INTRAMUSCULAR; INTRAVENOUS at 15:45

## 2018-02-02 RX ADMIN — SODIUM CHLORIDE 1000 MILLILITER(S): 9 INJECTION INTRAMUSCULAR; INTRAVENOUS; SUBCUTANEOUS at 22:31

## 2018-02-02 RX ADMIN — SODIUM CHLORIDE 1000 MILLILITER(S): 9 INJECTION INTRAMUSCULAR; INTRAVENOUS; SUBCUTANEOUS at 15:45

## 2018-02-02 RX ADMIN — SODIUM CHLORIDE 1000 MILLILITER(S): 9 INJECTION INTRAMUSCULAR; INTRAVENOUS; SUBCUTANEOUS at 19:45

## 2018-02-02 RX ADMIN — Medication 13.18 MICROGRAM(S)/KG/MIN: at 22:29

## 2018-02-02 RX ADMIN — SODIUM CHLORIDE 1000 MILLILITER(S): 9 INJECTION INTRAMUSCULAR; INTRAVENOUS; SUBCUTANEOUS at 13:00

## 2018-02-02 RX ADMIN — Medication 13.18 MICROGRAM(S)/KG/MIN: at 21:34

## 2018-02-02 NOTE — PROCEDURE NOTE - NSPROCDETAILS_GEN_ALL_CORE
lumen(s) aspirated and flushed/sterile dressing applied/ultrasound guidance/guidewire recovered/sterile technique, catheter placed

## 2018-02-02 NOTE — BRIEF OPERATIVE NOTE - PROCEDURE
<<-----Click on this checkbox to enter Procedure Bilateral nephrostomy  02/02/2018    Active  DAXELROD

## 2018-02-02 NOTE — BRIEF OPERATIVE NOTE - POST-OP DX
Nephrostomy tube displaced  02/02/2018  bilat; left out of kidney, right completely out; bilat hydro  Active  Grady Oreilly

## 2018-02-02 NOTE — PROCEDURE NOTE - NSINDICATIONS_GEN_A_CORE
hemodynamic monitoring/emergency venous access/hypertonic/irritant infusion/volume resuscitation/venous access/critical illness

## 2018-02-02 NOTE — BRIEF OPERATIVE NOTE - OPERATION/FINDINGS
bilat hydro. left 10 fr pcn placed under sono and fluoro with patient under sedation. following first pcn, pt became hypotensive and developed rigors c/w sepsis. to proceed with necessary emergent drainage of the right kidney, conversion to general anesthesia was appropriately performed. right 10fr pcn placed with sono and fluoro. bloody urine with clots drained from left. turbid brown urine draining from right. specimens sent from both sides. pt to PACU intubated.

## 2018-02-02 NOTE — ED ADULT TRIAGE NOTE - CHIEF COMPLAINT QUOTE
Pt. to the ED BIBA from NH C/O Dislodged Nephrostomy Tube- Hx. of dementia, HTN, Hydronephrosis and Acute Kidney Failure

## 2018-02-02 NOTE — CONSULT NOTE ADULT - SUBJECTIVE AND OBJECTIVE BOX
· Chief Complaint: The patient is a 82y Male complaining of see chief complaint quote.	  · HPI Objective Statement: 83 y/o male with pmhx of Alzheimer's, dementia, HTN, hydronephrosis and acute kidney failure presents to ED BIBA from nursing home c/o dislodged nephrostomy tube. As per nurse, nursing home states pt pulled tube out but is unsure. Pt is a poor historian and HPI/ROS is limited.	  · Presenting Symptoms: dislodged nephrostomy tube	  · Negative Findings: no fever	  · Timing: sudden onset	  · Duration: today 	  · Severity: high  Currently hypotensive, intubated on 2 pressors in the foot IV lines.  	    PAST MEDICAL/SURGICAL/FAMILY/SOCIAL HISTORY:    Past Medical History:  Dementia    Dementia with behavioral disturbance, unspecified dementia type    Depression    Gait abnormality    Hypertension    Macular degeneration    Nasal bone fractures    Posture abnormality.     Past Surgical History:  Prior nephrostomies.    ROS- intubated      ICU Vital Signs Last 24 Hrs  T(C): 36.9 (02 Feb 2018 20:00), Max: 37.3 (02 Feb 2018 13:37)  T(F): 98.5 (02 Feb 2018 20:00), Max: 99.2 (02 Feb 2018 13:37)  HR: 129 (02 Feb 2018 20:00) (93 - 135)  BP: 96/29 (02 Feb 2018 20:00) (79/46 - 107/69)  BP(mean): --  ABP: --  ABP(mean): --  RR: 17 (02 Feb 2018 20:00) (16 - 17)  SpO2: 100% (02 Feb 2018 20:00) (93% - 100%)    PE    Intubated.  Neck- symetric, supple.  Lungs- good air entry bilateral.  Heart- RRR  Abd- non distended, soft  Flank- bilateral nephrostomies with bloody output.  Ext- -edema                                8.3    12.5  )-----------( 176      ( 02 Feb 2018 14:07 )             26.0     CBC Full  -  ( 02 Feb 2018 14:07 )  WBC Count : 12.5 K/uL  Hemoglobin : 8.3 g/dL  Hematocrit : 26.0 %  Platelet Count - Automated : 176 K/uL  Mean Cell Volume : 88.9 fl  Mean Cell Hemoglobin : 28.4 pg  Mean Cell Hemoglobin Concentration : 31.9 gm/dL  Auto Neutrophil # : x  Auto Lymphocyte # : x  Auto Monocyte # : x  Auto Eosinophil # : x  Auto Basophil # : x  Auto Neutrophil % : x  Auto Lymphocyte % : x  Auto Monocyte % : x  Auto Eosinophil % : x  Auto Basophil % : x    02-02    148<H>  |  117<H>  |  53<H>  ----------------------------<  92  5.1   |  26  |  2.84<H>    Ca    8.6      02 Feb 2018 14:07    TPro  7.6  /  Alb  2.3<L>  /  TBili  0.6  /  DBili  x   /  AST  12<L>  /  ALT  8<L>  /  AlkPhos  128<H>  02-02    LIVER FUNCTIONS - ( 02 Feb 2018 14:07 )  Alb: 2.3 g/dL / Pro: 7.6 gm/dL / ALK PHOS: 128 U/L / ALT: 8 U/L / AST: 12 U/L / GGT: x             PT/INR - ( 02 Feb 2018 15:37 )   PT: 12.5 sec;   INR: 1.15 ratio         PTT - ( 02 Feb 2018 15:37 )  PTT:26.7 sec

## 2018-02-02 NOTE — ED PROVIDER NOTE - OBJECTIVE STATEMENT
81 y/o male with pmhx of Alzheimer's, dementia, HTN, hydronephrosis and acute kidney failure presents to ED BIBA from nursing home c/o dislodged nephrostomy tube. As per nurse, nursing home states pt pulled tube out but is unsure. Pt is a poor historian and HPI/ROS is limited.

## 2018-02-02 NOTE — ED PROVIDER NOTE - PROGRESS NOTE DETAILS
Drew Gao: Dr. Espino spoke to Dr. Oreilly who wants kidney US. Pt apparently got intubated for procedure and admitted.

## 2018-02-02 NOTE — PROCEDURE NOTE - PROCEDURE
<<-----Click on this checkbox to enter Procedure Central line placement  02/02/2018    Active  MALAVIN

## 2018-02-03 VITALS — RESPIRATION RATE: 14 BRPM

## 2018-02-03 LAB
ANION GAP SERPL CALC-SCNC: 14 MMOL/L — SIGNIFICANT CHANGE UP (ref 5–17)
BASE EXCESS BLDA CALC-SCNC: -10.1 MMOL/L — LOW (ref -2–2)
BASE EXCESS BLDA CALC-SCNC: -16.9 MMOL/L — LOW (ref -2–2)
BLOOD GAS COMMENTS ARTERIAL: SIGNIFICANT CHANGE UP
BUN SERPL-MCNC: 47 MG/DL — HIGH (ref 7–23)
CALCIUM SERPL-MCNC: 6.3 MG/DL — CRITICAL LOW (ref 8.5–10.1)
CHLORIDE SERPL-SCNC: 120 MMOL/L — HIGH (ref 96–108)
CO2 SERPL-SCNC: 18 MMOL/L — LOW (ref 22–31)
CREAT SERPL-MCNC: 3.45 MG/DL — HIGH (ref 0.5–1.3)
E COLI DNA BLD POS QL NAA+NON-PROBE: SIGNIFICANT CHANGE UP
GAS PNL BLDA: SIGNIFICANT CHANGE UP
GAS PNL BLDA: SIGNIFICANT CHANGE UP
GLUCOSE SERPL-MCNC: 264 MG/DL — HIGH (ref 70–99)
GRAM STN FLD: SIGNIFICANT CHANGE UP
GRAM STN FLD: SIGNIFICANT CHANGE UP
HCO3 BLDA-SCNC: 12 MMOL/L — LOW (ref 21–29)
HCO3 BLDA-SCNC: 16 MMOL/L — LOW (ref 21–29)
HCT VFR BLD CALC: 28.7 % — LOW (ref 39–50)
HGB BLD-MCNC: 9.3 G/DL — LOW (ref 13–17)
HOROWITZ INDEX BLDA+IHG-RTO: SIGNIFICANT CHANGE UP
LDH SERPL L TO P-CCNC: 607 U/L — HIGH (ref 84–241)
MAGNESIUM SERPL-MCNC: 1.5 MG/DL — LOW (ref 1.6–2.6)
MCHC RBC-ENTMCNC: 28.8 PG — SIGNIFICANT CHANGE UP (ref 27–34)
MCHC RBC-ENTMCNC: 32.4 GM/DL — SIGNIFICANT CHANGE UP (ref 32–36)
MCV RBC AUTO: 89 FL — SIGNIFICANT CHANGE UP (ref 80–100)
METHOD TYPE: SIGNIFICANT CHANGE UP
NIGHT BLUE STAIN TISS: SIGNIFICANT CHANGE UP
PCO2 BLDA: 38 MMHG — SIGNIFICANT CHANGE UP (ref 32–46)
PCO2 BLDA: 46 MMHG — SIGNIFICANT CHANGE UP (ref 32–46)
PH BLDA: 7.04 — CRITICAL LOW (ref 7.35–7.45)
PH BLDA: 7.25 — LOW (ref 7.35–7.45)
PHOSPHATE SERPL-MCNC: 2.6 MG/DL — SIGNIFICANT CHANGE UP (ref 2.5–4.5)
PHOSPHATE SERPL-MCNC: 3.9 MG/DL — SIGNIFICANT CHANGE UP (ref 2.5–4.5)
PLATELET # BLD AUTO: 75 K/UL — LOW (ref 150–400)
PO2 BLDA: 134 MMHG — HIGH (ref 74–108)
PO2 BLDA: 70 MMHG — LOW (ref 74–108)
POTASSIUM SERPL-MCNC: 4.2 MMOL/L — SIGNIFICANT CHANGE UP (ref 3.5–5.3)
POTASSIUM SERPL-SCNC: 4.2 MMOL/L — SIGNIFICANT CHANGE UP (ref 3.5–5.3)
RBC # BLD: 3.22 M/UL — LOW (ref 4.2–5.8)
RBC # FLD: 14.8 % — HIGH (ref 10.3–14.5)
SAO2 % BLDA: 84 % — LOW (ref 92–96)
SAO2 % BLDA: 99 % — HIGH (ref 92–96)
SODIUM SERPL-SCNC: 152 MMOL/L — HIGH (ref 135–145)
SPECIMEN SOURCE: SIGNIFICANT CHANGE UP
WBC # BLD: 23.4 K/UL — HIGH (ref 3.8–10.5)
WBC # FLD AUTO: 23.4 K/UL — HIGH (ref 3.8–10.5)

## 2018-02-03 RX ORDER — CALCIUM GLUCONATE 100 MG/ML
1 VIAL (ML) INTRAVENOUS ONCE
Qty: 0 | Refills: 0 | Status: COMPLETED | OUTPATIENT
Start: 2018-02-03 | End: 2018-02-03

## 2018-02-03 RX ORDER — MEROPENEM 1 G/30ML
INJECTION INTRAVENOUS
Qty: 0 | Refills: 0 | Status: DISCONTINUED | OUTPATIENT
Start: 2018-02-03 | End: 2018-02-03

## 2018-02-03 RX ORDER — SODIUM BICARBONATE 1 MEQ/ML
0.32 SYRINGE (ML) INTRAVENOUS
Qty: 150 | Refills: 0 | Status: DISCONTINUED | OUTPATIENT
Start: 2018-02-03 | End: 2018-02-03

## 2018-02-03 RX ORDER — CALCIUM GLUCONATE 100 MG/ML
1 VIAL (ML) INTRAVENOUS ONCE
Qty: 0 | Refills: 0 | Status: DISCONTINUED | OUTPATIENT
Start: 2018-02-03 | End: 2018-02-03

## 2018-02-03 RX ORDER — MEROPENEM 1 G/30ML
500 INJECTION INTRAVENOUS ONCE
Qty: 0 | Refills: 0 | Status: COMPLETED | OUTPATIENT
Start: 2018-02-03 | End: 2018-02-03

## 2018-02-03 RX ORDER — SODIUM BICARBONATE 1 MEQ/ML
100 SYRINGE (ML) INTRAVENOUS ONCE
Qty: 0 | Refills: 0 | Status: COMPLETED | OUTPATIENT
Start: 2018-02-03 | End: 2018-02-03

## 2018-02-03 RX ORDER — MEROPENEM 1 G/30ML
500 INJECTION INTRAVENOUS EVERY 12 HOURS
Qty: 0 | Refills: 0 | Status: DISCONTINUED | OUTPATIENT
Start: 2018-02-03 | End: 2018-02-03

## 2018-02-03 RX ORDER — SODIUM CHLORIDE 9 MG/ML
1000 INJECTION, SOLUTION INTRAVENOUS
Qty: 0 | Refills: 0 | Status: DISCONTINUED | OUTPATIENT
Start: 2018-02-03 | End: 2018-02-03

## 2018-02-03 RX ORDER — HEPARIN SODIUM 5000 [USP'U]/ML
5000 INJECTION INTRAVENOUS; SUBCUTANEOUS EVERY 12 HOURS
Qty: 0 | Refills: 0 | Status: DISCONTINUED | OUTPATIENT
Start: 2018-02-03 | End: 2018-02-03

## 2018-02-03 RX ORDER — CEFEPIME 1 G/1
500 INJECTION, POWDER, FOR SOLUTION INTRAMUSCULAR; INTRAVENOUS EVERY 24 HOURS
Qty: 0 | Refills: 0 | Status: DISCONTINUED | OUTPATIENT
Start: 2018-02-03 | End: 2018-02-03

## 2018-02-03 RX ORDER — PROPOFOL 10 MG/ML
5 INJECTION, EMULSION INTRAVENOUS
Qty: 500 | Refills: 0 | Status: DISCONTINUED | OUTPATIENT
Start: 2018-02-03 | End: 2018-02-03

## 2018-02-03 RX ADMIN — Medication 81 MILLIGRAM(S): at 11:07

## 2018-02-03 RX ADMIN — Medication 200 GRAM(S): at 09:42

## 2018-02-03 RX ADMIN — PHENYLEPHRINE HYDROCHLORIDE 263.62 MICROGRAM(S)/KG/MIN: 10 INJECTION INTRAVENOUS at 07:58

## 2018-02-03 RX ADMIN — MEROPENEM 100 MILLIGRAM(S): 1 INJECTION INTRAVENOUS at 10:40

## 2018-02-03 RX ADMIN — Medication 150 MEQ/KG/HR: at 08:03

## 2018-02-03 RX ADMIN — FINASTERIDE 5 MILLIGRAM(S): 5 TABLET, FILM COATED ORAL at 11:06

## 2018-02-03 RX ADMIN — Medication 100 MILLIEQUIVALENT(S): at 00:33

## 2018-02-03 RX ADMIN — Medication 13.18 MICROGRAM(S)/KG/MIN: at 11:13

## 2018-02-03 RX ADMIN — Medication 13.18 MICROGRAM(S)/KG/MIN: at 02:58

## 2018-02-03 RX ADMIN — Medication 13.18 MICROGRAM(S)/KG/MIN: at 00:34

## 2018-02-03 RX ADMIN — Medication 150 MEQ/KG/HR: at 00:57

## 2018-02-03 RX ADMIN — SODIUM CHLORIDE 1000 MILLILITER(S): 9 INJECTION, SOLUTION INTRAVENOUS at 00:35

## 2018-02-03 RX ADMIN — CEFEPIME 100 MILLIGRAM(S): 1 INJECTION, POWDER, FOR SOLUTION INTRAMUSCULAR; INTRAVENOUS at 09:42

## 2018-02-03 NOTE — PROVIDER CONTACT NOTE (CRITICAL VALUE NOTIFICATION) - TEST AND RESULT REPORTED:
ABG pH 7.04
Blood culture drawn 2/2/18 @ 15:37 -- growth in anaerobic bottle - gram negative rods
Gram -Rods  in anerobic bottle
lactate 2.5

## 2018-02-03 NOTE — PROGRESS NOTE ADULT - ASSESSMENT
IMP:    Septic shock--urinary source--S/P B/L PCN tube replacement   Acute resp failure--vented  CKD 4  Toxic encephalopathy     HTN. Dementia. NHR    Plan:    Cont with cefepime (renally dosed)--follow up Cxs  Pressors to keep MAP 60-65  Not candidate for vent weaning  Repeat labs today at 1600--Cont with Bicarb gtt for now  Neuro checks  DC sedation   HOB > 30  NPO except for meds  B/L PCN tubes to drainage  DVT prophy--SCD and sqhep  Keep CVL in    ICU care--d/w ICU staff IMP:    Septic shock--urinary source--S/P B/L PCN tube replacement--Pt with ESBL and completed course of merro in [place]  Acute resp failure--vented  CKD 4  Toxic encephalopathy     HTN. Dementia. NHR    Plan:    Cont with cefepime (renally dosed)--follow up Cxs--will obtain ID consult   Pressors to keep MAP 60-65  Not candidate for vent weaning  Repeat labs today at 1600--Cont with Bicarb gtt for now  Neuro checks  DC sedation   HOB > 30  NPO except for meds  B/L PCN tubes to drainage  DVT prophy--SCD and sqhep  Keep CVL in    ICU care--d/w ICU staff

## 2018-02-03 NOTE — DISCHARGE NOTE FOR THE EXPIRED PATIENT - HOSPITAL COURSE
81 YO M with PMHx of Alzheimer's Dementia, HTN, CKD4, Hydronephrosis s/p bilateral tube placement 1/19/2018 and ESBL recently completed course of Meropenem in January, presenting with tube dislodgement from nursing home.  Patient was taken to IR for tube placement correction where he went into septic shock requiring pressor support and placed on ventilator Perioperatively.  Once admitted to the ICU, Patients blood pressure was improving.  At 12:10PM, patient became unresponsive with sudden onset cardiopulmonary failure. Patient has MOLST form in the chart: DNR/DNI, no pressors.  HCP son Bob Sauer contacted at time of death.    Death Certificate #[id number]

## 2018-02-03 NOTE — PROGRESS NOTE ADULT - ASSESSMENT
#Septic Shock   - s/p bilateral PCN tube placement  - source likely urine  - Recent ESBL with completed Meropenem course  - Cefepime (renally dosed)  - ID consult  - Neuro checks  - Avoid sedatives  - Patient is not a candidate for extubation at this time  - Continue pressors  - CT head this AM    #PRABHU on CKD4  - Likely due to hypoperfusion  - Continue Pressors    #Hypocalcemia  - Repleted    #DVT PPX  - Hep SQ  - Venodynes

## 2018-02-03 NOTE — CHART NOTE - NSCHARTNOTEFT_GEN_A_CORE
Patient is a 82y old  Male who presents with a chief complaint of Dislodged PCN tubes (03 Feb 2018 12:23)      HPI:  81 y/o male with pmhx of Alzheimer's, dementia, HTN, hydronephrosis and acute kidney failure previous admission to  with sepsis from scrotal abscess/esbl/candida uti and urinary retention with hydro requiring b/l nephrostomy placement, pt admitted 2/2 from NH c/o dislodged nephrostomy tube. As per nurse, nursing home states pt pulled tube out but is unsure. Pt eval by IR and nephrostomy tubes were replaced, pt noted to have purulent output from nephrostomies and loredo, course c/b septic shock requiring pressor support (2 pressors) and intubation d/t resp failure, was given IV cefepime/vanco.      Past Medical History:  Dementia    Dementia with behavioral disturbance, unspecified dementia type    Depression    Gait abnormality    Hypertension    Macular degeneration    Nasal bone fractures    Posture abnormality.     Past Surgical History:  Prior nephrostomies.    Meds: per reconciliation sheet, noted below    MEDICATIONS  (STANDING):  aspirin enteric coated 81 milliGRAM(s) Oral daily  finasteride 5 milliGRAM(s) Oral daily  heparin  Injectable 5000 Unit(s) SubCutaneous every 12 hours  meropenem  IVPB 500 milliGRAM(s) IV Intermittent every 12 hours  meropenem  IVPB      norepinephrine Infusion 0.1 MICROgram(s)/kG/Min (13.181 mL/Hr) IV Continuous <Continuous>  phenylephrine    Infusion 10 MICROgram(s)/kG/Min (263.625 mL/Hr) IV Continuous <Continuous>  propofol Infusion 5 MICROgram(s)/kG/Min (2.109 mL/Hr) IV Continuous <Continuous>  sodium bicarbonate  Infusion 0.32 mEq/kG/Hr (150 mL/Hr) IV Continuous <Continuous>  tamsulosin Oral Tab/Cap - Peds 0.4 milliGRAM(s) Oral at bedtime      Allergies    No Known Allergies    Intolerances        Social: no smoking, no alcohol, no illegal drugs; no recent travel, no exposure to TB    Family history:  No pertinent family history in first degree relatives      ROS: unable to obtain d/t medical condition    Vital Signs Last 24 Hrs  T(C): 36.2 (03 Feb 2018 10:00), Max: 36.9 (02 Feb 2018 20:00)  T(F): 97.1 (03 Feb 2018 10:00), Max: 98.5 (02 Feb 2018 20:00)  HR: 0 (03 Feb 2018 12:10) (0 - 144)  BP: 86/17 (03 Feb 2018 12:00) (70/24 - 177/148)  BP(mean): 30 (03 Feb 2018 12:00) (30 - 154)  RR: 14 (03 Feb 2018 12:10) (12 - 31)  SpO2: 100% (03 Feb 2018 11:45) (74% - 100%)      PE:  Constitutional: frail looking  HEENT: NC/AT, EOMI, PERRLA  Neck: supple  Back: no tenderness  Respiratory: clear  Cardiovascular: S1S2 regular, no murmurs  Abdomen: soft, not tender, not distended, positive BS  Genitourinary: deferred, loredo with debris/purulent urine - nephrostomies in place  Rectal: deferred  Musculoskeletal: no muscle tenderness, no joint swelling or tenderness  Extremities: no pedal edema  Neurological: no focal deficits  Skin: no rashes, LIJ line in place    Labs:                        9.3    23.4  )-----------( 75       ( 03 Feb 2018 07:52 )             28.7     02-03    152<H>  |  120<H>  |  47<H>  ----------------------------<  264<H>  4.2   |  18<L>  |  3.45<H>    Ca    6.3<LL>      03 Feb 2018 07:52  Phos  3.9     02-03  Mg     1.5     02-02    TPro  4.9<L>  /  Alb  1.4<L>  /  TBili  1.7<H>  /  DBili  x   /  AST  49<H>  /  ALT  10<L>  /  AlkPhos  113  02-02     LIVER FUNCTIONS - ( 02 Feb 2018 21:00 )  Alb: 1.4 g/dL / Pro: 4.9 gm/dL / ALK PHOS: 113 U/L / ALT: 10 U/L / AST: 49 U/L / GGT: x               Culture Results:   Growth in anaerobic bottle: Gram Negative Rods (02-02 @ 21:00)  Culture Results:   Growth in anaerobic bottle: Gram Negative Rods  ***Blood Panel PCR results on this specimen are available  approximately 3 hours after the Gram stain result.***  Gram stain, PCR, and/or culture results may not always  correspond due to difference in methodologies.  ************************************************************  This PCR assay was performed using [employer].  The following targets are tested for: Enterococcus,  vancomycin resistant enterococci, Listeria monocytogenes,  coagulase negative staphylococci, S. aureus,  methicillin resistant S. aureus, Streptococcus agalactiae  (Group B), S. pneumoniae, S. pyogenes (Group A),  Acinetobacter baumannii, Enterobacter cloacae, E. coli,  Klebsiella oxytoca, K. pneumoniae, Proteus sp.,  Serratia marcescens, Haemophilus influenzae,  Neisseria meningitidis, Pseudomonas aeruginosa, Candida  albicans, C. glabrata, C krusei, C parapsilosis,  C. tropicalis and the KPC resistance gene. (02-02 @ 15:37)      Radiology:  reviewed  Code Status: full code    Assessment/Plan  81 y/o male with pmhx of Alzheimer's, dementia, HTN, hydronephrosis and acute kidney failure previous admission to  with sepsis from scrotal abscess/esbl/candida uti and urinary retention with hydro requiring b/l nephrostomy placement, pt admitted 2/2 from NH c/o dislodged nephrostomy tube. As per nurse, nursing home states pt pulled tube out but is unsure. Pt eval by IR and nephrostomy tubes were replaced, pt noted to have purulent output from nephrostomies and loredo, course c/b septic shock requiring pressor support (2 pressors) and intubation d/t resp failure, was given IV cefepime/vanco.    1. GNR septic shock/UTI/b/l hydro with nephrostomies/obstructive uropathy/scrotal abscess  - d/c cefepime, switch to meropenem [id number] renally-dose adjusted  - blood cx growing GNRs f/u id/sensitivity  - f/u urine cx  - urology eval, may need drainage of scrotal abscess  - consider US scrotum  - nephrostomies replaced and draining along with loredo  - f/u cbc  - monitor temps  -tolerating abx well so far; no side effects noted  -reason for abx use and side effects reviewed with patient  - supportive care  - poor prognosis    2. other issues - care per medicine

## 2018-02-03 NOTE — PROGRESS NOTE ADULT - SUBJECTIVE AND OBJECTIVE BOX
CC:  Sepsis     HPI:    81 y/o male NHR with dementia, HTN and chronic B/L PCN tubes sent in from NH for dislodged tubes.  Pt underwent IR tube replacement on 2/2--case complicated by sepsis, septic shock and acute resp failure--intubated and started on pressors and admitted to ICU    2/3:  Pt seen and examined in ICU. vented. on susy 1 and levo 0.2.  min responsive. W/D pain and breathing over set vent RR.  CXR--CVL OK/ETT slightly deep.   TTE (1/18)--EF 50-55%/Min daryn disease      PMH:  As above.     PSH:  As above.     FH: Non Contributory other than those listed in HPI    Social History:      MEDICATIONS  (STANDING):  aspirin enteric coated 81 milliGRAM(s) Oral daily  cefepime  IVPB 500 milliGRAM(s) IV Intermittent every 24 hours  dexmedetomidine Infusion 0.1 MICROgram(s)/kG/Hr (1.758 mL/Hr) IV Continuous <Continuous>  finasteride 5 milliGRAM(s) Oral daily  heparin  Injectable 5000 Unit(s) SubCutaneous every 12 hours  norepinephrine Infusion 0.1 MICROgram(s)/kG/Min (13.181 mL/Hr) IV Continuous <Continuous>  phenylephrine    Infusion 10 MICROgram(s)/kG/Min (263.625 mL/Hr) IV Continuous <Continuous>  sodium bicarbonate  Infusion 0.32 mEq/kG/Hr (150 mL/Hr) IV Continuous <Continuous>  tamsulosin Oral Tab/Cap - Peds 0.4 milliGRAM(s) Oral at bedtime    MEDICATIONS  (PRN):  acetaminophen    Suspension 650 milliGRAM(s) Enteral Tube every 6 hours PRN pain and temp  LORazepam   Injectable 1 milliGRAM(s) IntraMuscular every 2 hours PRN Agitation      Allergies: NKDA    ROS:  SEE BELOW    Height (cm): 182.88 (02-02 @ 13:37)  Weight (kg): 70.3 (02-02 @ 13:37)  BMI (kg/m2): 21 (02-02 @ 13:37)    ICU Vital Signs Last 24 Hrs  T(C): 35.6 (03 Feb 2018 06:30), Max: 37.3 (02 Feb 2018 13:37)  T(F): 96 (03 Feb 2018 06:30), Max: 99.2 (02 Feb 2018 13:37)  HR: 105 (03 Feb 2018 08:00) (93 - 144)  BP: 90/57 (03 Feb 2018 08:00) (70/24 - 134/85)  BP(mean): 65 (03 Feb 2018 08:00) (31 - 96)  ABP: --  ABP(mean): --  RR: 16 (03 Feb 2018 08:00) (12 - 21)  SpO2: 100% (03 Feb 2018 08:00) (74% - 100%)      Mode: AC/ CMV (Assist Control/ Continuous Mandatory Ventilation)  RR (machine): 14  TV (machine): 500  FiO2: 50  PEEP: 5  ITime: 1  PIP: 22      I&O's Summary    02 Feb 2018 07:01  -  03 Feb 2018 07:00  --------------------------------------------------------  IN: 7126.6 mL / OUT: 605 mL / NET: 6521.6 mL        Physical Exam:  SEE BELOW                          6.7    2.3   )-----------( 117      ( 02 Feb 2018 21:00 )             21.5       02-02    153<H>  |  124<H>  |  49<H>  ----------------------------<  136<H>  4.0   |  17<L>  |  2.93<H>    Ca    6.7<L>      02 Feb 2018 21:00  Phos  2.6     02-02  Mg     1.5     02-02    TPro  4.9<L>  /  Alb  1.4<L>  /  TBili  1.7<H>  /  DBili  x   /  AST  49<H>  /  ALT  10<L>  /  AlkPhos  113  02-02            ABG - ( 03 Feb 2018 06:28 )  pH: 7.25  /  pCO2: 38    /  pO2: 134   / HCO3: 16    / Base Excess: -10.1 /  SaO2: 99                      DVT Prophylaxis:                                                            Contraindication:     Advanced Directives:    Discussed with:    Visit Information:  Time spent excluding procedure:  60 cc mins    ** Time is exclusive of billed procedures and/or teaching and/or routine family updates. CC:  Sepsis     HPI:    81 y/o male NHR with dementia, HTN, ESBL and just recently completed course of merro and chronic B/L PCN tubes sent in from NH for dislodged tubes.  Pt underwent IR tube replacement on 2/2--case complicated by sepsis, septic shock and acute resp failure--intubated and started on pressors and admitted to ICU    2/3:  Pt seen and examined in ICU. vented. on ssuy 1 and levo 0.2.  min responsive. W/D pain and breathing over set vent RR.  CXR--CVL OK/ETT slightly deep.   TTE (1/18)--EF 50-55%/Min daryn disease      PMH:  As above.     PSH:  As above.     FH: Non Contributory other than those listed in HPI    Social History:      MEDICATIONS  (STANDING):  aspirin enteric coated 81 milliGRAM(s) Oral daily  cefepime  IVPB 500 milliGRAM(s) IV Intermittent every 24 hours  dexmedetomidine Infusion 0.1 MICROgram(s)/kG/Hr (1.758 mL/Hr) IV Continuous <Continuous>  finasteride 5 milliGRAM(s) Oral daily  heparin  Injectable 5000 Unit(s) SubCutaneous every 12 hours  norepinephrine Infusion 0.1 MICROgram(s)/kG/Min (13.181 mL/Hr) IV Continuous <Continuous>  phenylephrine    Infusion 10 MICROgram(s)/kG/Min (263.625 mL/Hr) IV Continuous <Continuous>  sodium bicarbonate  Infusion 0.32 mEq/kG/Hr (150 mL/Hr) IV Continuous <Continuous>  tamsulosin Oral Tab/Cap - Peds 0.4 milliGRAM(s) Oral at bedtime    MEDICATIONS  (PRN):  acetaminophen    Suspension 650 milliGRAM(s) Enteral Tube every 6 hours PRN pain and temp  LORazepam   Injectable 1 milliGRAM(s) IntraMuscular every 2 hours PRN Agitation      Allergies: NKDA    ROS:  SEE BELOW    Height (cm): 182.88 (02-02 @ 13:37)  Weight (kg): 70.3 (02-02 @ 13:37)  BMI (kg/m2): 21 (02-02 @ 13:37)    ICU Vital Signs Last 24 Hrs  T(C): 35.6 (03 Feb 2018 06:30), Max: 37.3 (02 Feb 2018 13:37)  T(F): 96 (03 Feb 2018 06:30), Max: 99.2 (02 Feb 2018 13:37)  HR: 105 (03 Feb 2018 08:00) (93 - 144)  BP: 90/57 (03 Feb 2018 08:00) (70/24 - 134/85)  BP(mean): 65 (03 Feb 2018 08:00) (31 - 96)  ABP: --  ABP(mean): --  RR: 16 (03 Feb 2018 08:00) (12 - 21)  SpO2: 100% (03 Feb 2018 08:00) (74% - 100%)      Mode: AC/ CMV (Assist Control/ Continuous Mandatory Ventilation)  RR (machine): 14  TV (machine): 500  FiO2: 50  PEEP: 5  ITime: 1  PIP: 22      I&O's Summary    02 Feb 2018 07:01  -  03 Feb 2018 07:00  --------------------------------------------------------  IN: 7126.6 mL / OUT: 605 mL / NET: 6521.6 mL        Physical Exam:  SEE BELOW                          6.7    2.3   )-----------( 117      ( 02 Feb 2018 21:00 )             21.5       02-02    153<H>  |  124<H>  |  49<H>  ----------------------------<  136<H>  4.0   |  17<L>  |  2.93<H>    Ca    6.7<L>      02 Feb 2018 21:00  Phos  2.6     02-02  Mg     1.5     02-02    TPro  4.9<L>  /  Alb  1.4<L>  /  TBili  1.7<H>  /  DBili  x   /  AST  49<H>  /  ALT  10<L>  /  AlkPhos  113  02-02            ABG - ( 03 Feb 2018 06:28 )  pH: 7.25  /  pCO2: 38    /  pO2: 134   / HCO3: 16    / Base Excess: -10.1 /  SaO2: 99                      DVT Prophylaxis:                                                            Contraindication:     Advanced Directives:    Discussed with:    Visit Information:  Time spent excluding procedure:  60 cc mins    ** Time is exclusive of billed procedures and/or teaching and/or routine family updates.

## 2018-02-03 NOTE — PROGRESS NOTE ADULT - SUBJECTIVE AND OBJECTIVE BOX
HPI:  83 YO M with PMHx of Alzheimer's Dementia, HTN, CKD4, Hydronephrosis s/p bilateral tube placement 1/19/2018 and ESBL recently completed course of Meropenem in January, presenting with tube dislodgement from nursing home.  Patient was taken to IR for tube placement correction where he went into septic shock requiring pressor support and placed on ventilator.      2/3/2018 - Patient seen and examined at bedside.  Hypotensive, on pressors and Bicarb drip, afebrile.  Minimal response to pain.  Patient to have CT head this AM.    MEDICATIONS  (STANDING):  aspirin enteric coated 81 milliGRAM(s) Oral daily  calcium gluconate IVPB 1 Gram(s) IV Intermittent once  cefepime  IVPB 500 milliGRAM(s) IV Intermittent every 24 hours  finasteride 5 milliGRAM(s) Oral daily  heparin  Injectable 5000 Unit(s) SubCutaneous every 12 hours  norepinephrine Infusion 0.1 MICROgram(s)/kG/Min (13.181 mL/Hr) IV Continuous <Continuous>  phenylephrine    Infusion 10 MICROgram(s)/kG/Min (263.625 mL/Hr) IV Continuous <Continuous>  sodium bicarbonate  Infusion 0.32 mEq/kG/Hr (150 mL/Hr) IV Continuous <Continuous>  tamsulosin Oral Tab/Cap - Peds 0.4 milliGRAM(s) Oral at bedtime    MEDICATIONS  (PRN):  acetaminophen    Suspension 650 milliGRAM(s) Enteral Tube every 6 hours PRN pain and temp    Vital Signs Last 24 Hrs  T(C): 35.6 (03 Feb 2018 06:30), Max: 37.3 (02 Feb 2018 13:37)  T(F): 96 (03 Feb 2018 06:30), Max: 99.2 (02 Feb 2018 13:37)  HR: 105 (03 Feb 2018 09:00) (93 - 144)  BP: 95/54 (03 Feb 2018 09:00) (70/24 - 134/85)  BP(mean): 64 (03 Feb 2018 09:00) (31 - 96)  RR: 22 (03 Feb 2018 09:00) (12 - 22)  SpO2: 100% (03 Feb 2018 09:00) (74% - 100%)    GEN: NAD  HEENT: NC/AT, PERRLA, MMM  CV: S1S2, RRR, no mumur  RESP: on vent, good air movement, CTABL, no rales, rhonchi or wheezing  ABD: +BS, soft, ND, NT, no guarding, no rigidity  EXT: +2 radial and pedal pulses, no edema, no calve tenderness  SKIN: No Rashes or Ulcers  NEURO:  AOx0, minimal response to pain.     LABS:                      9.3    23.4  )-----------( 75       ( 03 Feb 2018 07:52 )             28.7     03 Feb 2018 07:52    152    |  120    |  47     ----------------------------<  264    4.2     |  18     |  3.45     Ca    6.3        03 Feb 2018 07:52  Phos  3.9       03 Feb 2018 07:52  Mg     1.5       02 Feb 2018 21:00    TPro  4.9    /  Alb  1.4    /  TBili  1.7    /  DBili  x      /  AST  49     /  ALT  10     /  AlkPhos  113    02 Feb 2018 21:00    LIVER FUNCTIONS - ( 02 Feb 2018 21:00 )  Alb: 1.4 g/dL / Pro: 4.9 gm/dL / ALK PHOS: 113 U/L / ALT: 10 U/L / AST: 49 U/L / GGT: x           PT/INR - ( 02 Feb 2018 15:37 )   PT: 12.5 sec;   INR: 1.15 ratio         PTT - ( 02 Feb 2018 15:37 )  PTT:26.7 sec  CAPILLARY BLOOD GLUCOSE

## 2018-02-03 NOTE — DIETITIAN INITIAL EVALUATION ADULT. - OTHER INFO
Consult for chew/swallow.  83 y/o male NHR with dementia, HTN, ESBL,  sent in from NH for dislodged tubes. Dx septic shock, ARF, pt vented, CKD4, toxic encephalopathy, HTN, Dementia.  Not candidate for vent weaning. Consult for chew/swallow.  81 y/o male NHR with dementia, HTN, ESBL,  sent in from NH for dislodged tubes. Dx septic shock, ARF, pt vented, CKD4, toxic encephalopathy, HTN, Dementia.  Not candidate for vent weaning.  Pt was examined, NFPE performed.  Pt was deemed meeting criteria for severe protein-calorie malnutrition in context of chronic disease .  TF recommendation given to nursing.  Pt .

## 2018-02-04 LAB
-  AMIKACIN: SIGNIFICANT CHANGE UP
-  AMIKACIN: SIGNIFICANT CHANGE UP
-  AMPICILLIN/SULBACTAM: SIGNIFICANT CHANGE UP
-  AMPICILLIN/SULBACTAM: SIGNIFICANT CHANGE UP
-  AMPICILLIN: SIGNIFICANT CHANGE UP
-  AMPICILLIN: SIGNIFICANT CHANGE UP
-  AZTREONAM: SIGNIFICANT CHANGE UP
-  AZTREONAM: SIGNIFICANT CHANGE UP
-  CEFAZOLIN: SIGNIFICANT CHANGE UP
-  CEFAZOLIN: SIGNIFICANT CHANGE UP
-  CEFEPIME: SIGNIFICANT CHANGE UP
-  CEFEPIME: SIGNIFICANT CHANGE UP
-  CEFOXITIN: SIGNIFICANT CHANGE UP
-  CEFOXITIN: SIGNIFICANT CHANGE UP
-  CEFTAZIDIME: SIGNIFICANT CHANGE UP
-  CEFTAZIDIME: SIGNIFICANT CHANGE UP
-  CEFTRIAXONE: SIGNIFICANT CHANGE UP
-  CEFTRIAXONE: SIGNIFICANT CHANGE UP
-  CIPROFLOXACIN: SIGNIFICANT CHANGE UP
-  CIPROFLOXACIN: SIGNIFICANT CHANGE UP
-  ERTAPENEM: SIGNIFICANT CHANGE UP
-  ERTAPENEM: SIGNIFICANT CHANGE UP
-  GENTAMICIN: SIGNIFICANT CHANGE UP
-  GENTAMICIN: SIGNIFICANT CHANGE UP
-  IMIPENEM: SIGNIFICANT CHANGE UP
-  IMIPENEM: SIGNIFICANT CHANGE UP
-  LEVOFLOXACIN: SIGNIFICANT CHANGE UP
-  LEVOFLOXACIN: SIGNIFICANT CHANGE UP
-  MEROPENEM: SIGNIFICANT CHANGE UP
-  MEROPENEM: SIGNIFICANT CHANGE UP
-  NITROFURANTOIN: SIGNIFICANT CHANGE UP
-  NITROFURANTOIN: SIGNIFICANT CHANGE UP
-  PIPERACILLIN/TAZOBACTAM: SIGNIFICANT CHANGE UP
-  PIPERACILLIN/TAZOBACTAM: SIGNIFICANT CHANGE UP
-  TOBRAMYCIN: SIGNIFICANT CHANGE UP
-  TOBRAMYCIN: SIGNIFICANT CHANGE UP
-  TRIMETHOPRIM/SULFAMETHOXAZOLE: SIGNIFICANT CHANGE UP
-  TRIMETHOPRIM/SULFAMETHOXAZOLE: SIGNIFICANT CHANGE UP
CULTURE RESULTS: SIGNIFICANT CHANGE UP
CULTURE RESULTS: SIGNIFICANT CHANGE UP
GRAM STN FLD: SIGNIFICANT CHANGE UP
METHOD TYPE: SIGNIFICANT CHANGE UP
METHOD TYPE: SIGNIFICANT CHANGE UP
ORGANISM # SPEC MICROSCOPIC CNT: SIGNIFICANT CHANGE UP
SPECIMEN SOURCE: SIGNIFICANT CHANGE UP
SPECIMEN SOURCE: SIGNIFICANT CHANGE UP

## 2018-02-05 LAB
-  AMIKACIN: SIGNIFICANT CHANGE UP
-  AMPICILLIN/SULBACTAM: SIGNIFICANT CHANGE UP
-  AMPICILLIN: SIGNIFICANT CHANGE UP
-  AZTREONAM: SIGNIFICANT CHANGE UP
-  CEFAZOLIN: SIGNIFICANT CHANGE UP
-  CEFEPIME: SIGNIFICANT CHANGE UP
-  CEFOXITIN: SIGNIFICANT CHANGE UP
-  CEFTAZIDIME: SIGNIFICANT CHANGE UP
-  CEFTRIAXONE: SIGNIFICANT CHANGE UP
-  CIPROFLOXACIN: SIGNIFICANT CHANGE UP
-  ERTAPENEM: SIGNIFICANT CHANGE UP
-  GENTAMICIN: SIGNIFICANT CHANGE UP
-  IMIPENEM: SIGNIFICANT CHANGE UP
-  LEVOFLOXACIN: SIGNIFICANT CHANGE UP
-  MEROPENEM: SIGNIFICANT CHANGE UP
-  PIPERACILLIN/TAZOBACTAM: SIGNIFICANT CHANGE UP
-  TOBRAMYCIN: SIGNIFICANT CHANGE UP
-  TRIMETHOPRIM/SULFAMETHOXAZOLE: SIGNIFICANT CHANGE UP
CULTURE RESULTS: SIGNIFICANT CHANGE UP
CULTURE RESULTS: SIGNIFICANT CHANGE UP
METHOD TYPE: SIGNIFICANT CHANGE UP
ORGANISM # SPEC MICROSCOPIC CNT: SIGNIFICANT CHANGE UP
SPECIMEN SOURCE: SIGNIFICANT CHANGE UP
SPECIMEN SOURCE: SIGNIFICANT CHANGE UP

## 2018-02-06 LAB
CULTURE RESULTS: SIGNIFICANT CHANGE UP
SPECIMEN SOURCE: SIGNIFICANT CHANGE UP

## 2018-02-14 LAB
CULTURE RESULTS: SIGNIFICANT CHANGE UP
SPECIMEN SOURCE: SIGNIFICANT CHANGE UP

## 2018-02-16 NOTE — CDI QUERY NOTE - NSCDIOTHERTXTBX_GEN_ALL_CORE_HH
Patient admitted on 2/2 from SNF with dislodged nephrostomy tube.  Went to IR for replacement of tubes but developed hypotension, sepsis and septic shock    1) Please clarify if the Sepsis was present on admission or developed after admission ?    2) Patient with bilateral nephrostomy tubes . Noted  to have purulent output from nephrostomies and loredo,   Please clarify if there is a cause and effect relationship between the Sepsis and nephrostomy tubes ?  a) Sepsis is related to or associated with  nephrostomy tubes ?  b) Sepsis due to the dislodgement of the nephrostomy tube ?  c) Unable to establish a cause and effect relationship between the Sepsis and nephrostomy tubes /  d) Other ? please clarify    3) . Per dietary documentation :  Pt was deemed meeting criteria for severe protein-calorie malnutrition in context of chronic disease .   Please clarify if you agree or disagree with the clinical diagnosis of severe protein calorie malnutrition.

## 2018-02-16 NOTE — CHART NOTE - NSCHARTNOTEFT_GEN_A_CORE
Pt presented with and was admitted for UTI sepsis and dislodged PCN tubes--developed septic shock secondary to UTI.    Pt was also suffered from severe prot sariah malnutrition

## 2018-03-24 LAB
CULTURE RESULTS: SIGNIFICANT CHANGE UP
SPECIMEN SOURCE: SIGNIFICANT CHANGE UP

## 2019-07-03 NOTE — H&P ADULT - NSHPLABSRESULTS_GEN_ALL_CORE
9.7    8.5   )-----------( 102      ( 24 Dec 2017 22:29 )             29.4     24 Dec 2017 22:29    152    |  121    |  76     ----------------------------<  140    4.8     |  23     |  3.91     Ca    8.0        24 Dec 2017 22:29    TPro  6.8    /  Alb  2.9    /  TBili  0.5    /  DBili  x      /  AST  13     /  ALT  18     /  AlkPhos  97     24 Dec 2017 22:29    LIVER FUNCTIONS - ( 24 Dec 2017 22:29 )  Alb: 2.9 g/dL / Pro: 6.8 gm/dL / ALK PHOS: 97 U/L / ALT: 18 U/L / AST: 13 U/L / GGT: x           PT/INR - ( 24 Dec 2017 22:29 )   PT: 10.7 sec;   INR: 0.99 ratio    PTT - ( 24 Dec 2017 22:29 )  PTT:29.1 sec  CAPILLARY BLOOD GLUCOSE    Urinalysis Basic - ( 24 Dec 2017 00:30 )    Color: Red / Appearance: Slightly Turbid / S.010 / pH: x  Gluc: x / Ketone: Negative  / Bili: Negative / Urobili: Negative mg/dL   Blood: x / Protein: 100 mg/dL / Nitrite: Negative   Leuk Esterase: Small / RBC: >50 /HPF / WBC 3-5   Sq Epi: x / Non Sq Epi: x / Bacteria: Occasional
No

## 2019-09-03 NOTE — PATIENT PROFILE ADULT. - FUNCTIONAL SCREEN CURRENT LEVEL: BATHING, MLM
Villa Cloud MD, pager [phone]        (4) completely dependent

## 2019-10-14 NOTE — ED ADULT NURSE NOTE - IN THE PAST YEAR, HOW OFTEN HAVE YOU USED TOBACCO PRODUCTS?
"Requested Prescriptions   Pending Prescriptions Disp Refills     atorvastatin (LIPITOR) 10 MG tablet [Pharmacy Med Name: ATORVASTATIN 10MG TABLETS] 90 tablet 0     Sig: TAKE 1 TABLET(10 MG) BY MOUTH DAILY  Last Written Prescription Date:  6/14/19  Last Fill Quantity: 90,  # refills: 0   Last office visit: 4/8/2019 with prescribing provider:  Jomar   Future Office Visit:           Statins Protocol Failed - 10/12/2019  9:07 AM        Failed - LDL on file in past 12 months     Recent Labs   Lab Test 06/13/18  0717   *             Passed - No abnormal creatine kinase in past 12 months     No lab results found.             Passed - Recent (12 mo) or future (30 days) visit within the authorizing provider's specialty     Patient has had an office visit with the authorizing provider or a provider within the authorizing providers department within the previous 12 mos or has a future within next 30 days. See \"Patient Info\" tab in inbasket, or \"Choose Columns\" in Meds & Orders section of the refill encounter.              Passed - Medication is active on med list        Passed - Patient is age 18 or older          " Never

## 2019-10-18 NOTE — ED ADULT NURSE REASSESSMENT NOTE - STATUS
awaiting discharge, no change Normal rate, regular rhythm.  Heart sounds S1, S2.  No murmurs, rubs or gallops.

## 2020-06-29 NOTE — SWALLOW BEDSIDE ASSESSMENT ADULT - CONSISTENCIES ADMINISTERED
06/29/20 1531 JANES HIDALGO
PATIENT ADMITTED TO STEP DOWN FOR CONTINUED OBSERVATION. VSS UPON
INITIAL TRANSFER, HOWEVER HR WAS NOTED TO DROP TO 44 THEN 38 BPM.
PATIENT PLACED ON 3 LEAD EKG. DR. FUENTES NOTIFIED FOR ANESTHESIA. NEW
ORDER FOR 0.2 MG GLYCOPYRRULATE IV X ONE DOSE. PATIENT GIVEN PER MD
ORDER. PATIENT HR REMAINED AT 38-40. PATIENT COUGHS AND HR ELEVATED TO
60 BUT DECREASED TO 52. DR. FUENTES NOTIFIED AND NEW ORDER RECEIVED
FOR ATROPINE IV X ONE DOSE IF NEEDED. NO 12 LEAD AT THIS TIME PER DR. FUENTES. PATIENT IS ASYMMPTOMATIC AND DENIES DIZZINESS, FEELING LIGHT
HEADED OR SICK. ABDOMEN IS SORE. PATIENT TOELRATING PO INTAKE. WILL
CONTINUE TO MONITOR nectar thick/puree

## 2020-08-26 NOTE — H&P ADULT - NSHPPOAPRESSUREULCER_GEN_ALL_CORE
Spoke with patient   Aware of results  Denies concerns    Patient requested lab order to be mailed to her as she completed labs at her work    Updated health maintenance, surgical history and wait list.      no

## 2021-01-08 NOTE — PATIENT PROFILE ADULT. - PAIN SCALE PREFERRED, PROFILE
LVM for pt that Dr Pee Gibbs doesn't have a practice that she is going to and that Dr Pee Gibbs wouldn't be able to give her that information b/c of contract. numerical 0-10

## 2021-01-25 NOTE — PROGRESS NOTE ADULT - PROBLEM/PLAN-7
Caller would like to discuss a Return call for results from Xray 11/9/2020. Please advise.  Writer advised caller of callback within 24-72 hours.    Patient Name: Mahin Yuen  Caller Name: self  Name of Facility: [facility]  Callback Number: [phone]    Best Availability: anytime 9-5  Can A Detailed Message Be left? yes  Fax Number: na  Additional Info: na  Did you confirm the message with the caller?: yes    Thank you,  Alma Dickey    
Left message for Mahin Yuen  to return call to [facility].    
Patient is returning call regarding his x-ray results.  Patient requesting a callback from Dr Moreno and can be reached at [phone].   
Returned called. Xray negative. MRI is ordered for this Friday. Writer reminded patient to go to MRI and he will receive call from Podiatry to relay results once received. Patient verbalized understanding.   
DISPLAY PLAN FREE TEXT

## 2021-04-08 NOTE — PROVIDER CONTACT NOTE (CRITICAL VALUE NOTIFICATION) - SITUATION
pt admitted to ICU s/p BL nephrostomy on vasopressors and on mechanical vent, ABG pH 7.04
received critical notification from lab
Eucrisa Counseling: Patient may experience a mild burning sensation during topical application. Eucrisa is not approved in children less than 2 years of age.

## 2021-05-08 NOTE — ED PROVIDER NOTE - EXITCARE/DISCHARGE INSTRUCTIONS
Implemented All Universal Safety Interventions:  [place] to call system. Call bell, personal items and telephone within reach. Instruct patient to call for assistance. Room bathroom lighting operational. Non-slip footwear when patient is off stretcher. Physically safe environment: no spills, clutter or unnecessary equipment. Stretcher in lowest position, wheels locked, appropriate side rails in place. Launch [facility] and print the 'Prescriptions from this Visit' Report

## 2021-11-29 NOTE — PROGRESS NOTE ADULT - PROBLEM SELECTOR PROBLEM 6
Prophylactic measure Patient was internally preoccupied and unable to provide information/Unable to obtain

## 2022-05-12 NOTE — ED PROVIDER NOTE - NORMAL, MLM
carlita all pertinent systems normal Quality 130: Documentation Of Current Medications In The Medical Record: Current Medications Documented Quality 47: Advance Care Plan: Advance Care Planning discussed and documented; advance care plan or surrogate decision maker documented in the medical record. Quality 358: Patient-Centered Surgical Risk Assessment And Communication: A patient-specific risk assessment with a risk calculator was not completed or communicated to patient and/or family. Quality 226: Preventive Care And Screening: Tobacco Use: Screening And Cessation Intervention: Patient screened for tobacco use and is an ex/non-smoker Detail Level: Detailed Quality 111:Pneumonia Vaccination Status For Older Adults: Pneumococcal Vaccination Previously Received Quality 137: Melanoma: Continuity Of Care - Recall System: Patient information entered into a recall system that includes: target date for the next exam specified AND a process to follow up with patients regarding missed or unscheduled appointments Quality 431: Preventive Care And Screening: Unhealthy Alcohol Use - Screening: Patient not identified as an unhealthy alcohol user when screened for unhealthy alcohol use using a systematic screening method

## 2023-02-22 NOTE — ED PROVIDER NOTE - MUSCULOSKELETAL, MLM
No spinal midline tenderness with palpation. All joints ranged without deformity or tenderness. Azithromycin Pregnancy And Lactation Text: This medication is considered safe during pregnancy and is also secreted in breast milk.

## 2023-03-31 NOTE — PROGRESS NOTE ADULT - PROVIDER SPECIALTY LIST ADULT
Caller: Chrystal Ruiz    Relationship to patient: Self    Best call back number: [phone]    Chief complaint: LT KNEE    Type of visit: GEL INJECTION    Requested date: ASAP      
Dr Reyna agreed to give gel injection. I will send in a visco referral for gel injection process to start.   
Dr. Reyna saw this patient last, does he want to do the injection or does he prefer Dr. Oneill to do it??    Please advise 
Family Medicine
Hospitalist
I am happy to. Please start PA process and notify the patient.     CD
Infectious Disease
Nephrology
Palliative Care
Patient called asking inquiring about gel injections.  Patient is wanting this done ASAP.   
Patient was last injected by Dr. Reyna     Please enter visco referral 
Infectious Disease
Hospitalist
Nephrology
Hospitalist
Family Medicine

## 2023-05-05 NOTE — H&P ADULT - PROBLEM SELECTOR PROBLEM 2
Information reviewed.  Urinalysis ordered.  Should come to clinic laboratory this afternoon to leave specimen.  Please notify the patient's daughter.   Abnormal urinalysis Hematuria Anemia
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
## 2025-02-23 NOTE — PROVIDER CONTACT NOTE (OTHER) - SITUATION
Laryngoscopy    Date/Time: 1/28/2025 2:30 PM    Performed by: Angelito León PA-C  Authorized by: Angelito León PA-C    Anesthesia:     Local anesthetic:  Lidocaine 4% and Andrade-Synephrine 1/2%    Patient tolerance:  Patient tolerated the procedure well with no immediate complications    Decongestion performed?: Yes    Laryngoscopy:     Areas examined:  Nasopharynx, oropharynx, hypopharynx, larynx and vocal cords    Laryngoscope size: disposable ambu flexible scope.  Nose External:      No external nasal deformity  Nose Intranasal:      Mucosa no polyps     Mucosa ulcers not present     No mucosa lesions present     No septum gross deformity     Turbinates not enlarged  Nasopharynx:      No mucosa lesions     Posterior choanae patent     Eustachian tube patent  Larynx/hypopharynx:      No epiglottis lesions     No epiglottis edema     No AE folds lesions     No vocal cord polyps     Equal and normal bilateral     No hypopharynx lesions     No piriform sinus pooling     No piriform sinus lesions     Post cricoid edema     Post cricoid erythema     Good adduction of true vocal cords with phonation. Good abduction of true vocal cords with inspiration. No vocal cord pareses/paralysis.     
Spoke with service will notify 
Pt pulled Pt Out coude catheter
called md office dr coy already spoke with md
called md service spoke with carlita

## 2025-03-13 NOTE — ED PROVIDER NOTE - NSCAREINITIATED _GEN_ER
Joo Miranda(Attending) Auditory hallucinations Other Other Other Other Auditory hallucinations Auditory hallucinations Other Other Other Auditory hallucinations Other Auditory hallucinations